# Patient Record
Sex: MALE | Race: WHITE | NOT HISPANIC OR LATINO | URBAN - METROPOLITAN AREA
[De-identification: names, ages, dates, MRNs, and addresses within clinical notes are randomized per-mention and may not be internally consistent; named-entity substitution may affect disease eponyms.]

---

## 2017-03-16 ENCOUNTER — OUTPATIENT (OUTPATIENT)
Dept: OUTPATIENT SERVICES | Facility: HOSPITAL | Age: 47
LOS: 1 days | End: 2017-03-16
Payer: COMMERCIAL

## 2017-03-16 ENCOUNTER — APPOINTMENT (OUTPATIENT)
Dept: BARIATRICS | Facility: CLINIC | Age: 47
End: 2017-03-16

## 2017-03-16 VITALS
DIASTOLIC BLOOD PRESSURE: 84 MMHG | WEIGHT: 236 LBS | BODY MASS INDEX: 35.77 KG/M2 | SYSTOLIC BLOOD PRESSURE: 136 MMHG | HEIGHT: 68 IN

## 2017-03-16 DIAGNOSIS — Z98.89 OTHER SPECIFIED POSTPROCEDURAL STATES: Chronic | ICD-10-CM

## 2017-03-16 DIAGNOSIS — Z98.84 BARIATRIC SURGERY STATUS: ICD-10-CM

## 2017-03-16 DIAGNOSIS — E66.01 MORBID (SEVERE) OBESITY DUE TO EXCESS CALORIES: ICD-10-CM

## 2017-03-16 PROCEDURE — 74220 X-RAY XM ESOPHAGUS 1CNTRST: CPT | Mod: 26

## 2017-03-16 PROCEDURE — 74220 X-RAY XM ESOPHAGUS 1CNTRST: CPT

## 2017-03-27 ENCOUNTER — NON-APPOINTMENT (OUTPATIENT)
Age: 47
End: 2017-03-27

## 2017-03-27 ENCOUNTER — APPOINTMENT (OUTPATIENT)
Dept: INTERNAL MEDICINE | Facility: CLINIC | Age: 47
End: 2017-03-27

## 2017-03-27 VITALS
BODY MASS INDEX: 36.37 KG/M2 | DIASTOLIC BLOOD PRESSURE: 84 MMHG | HEART RATE: 62 BPM | TEMPERATURE: 98.5 F | RESPIRATION RATE: 17 BRPM | SYSTOLIC BLOOD PRESSURE: 129 MMHG | HEIGHT: 68 IN | OXYGEN SATURATION: 96 % | WEIGHT: 240 LBS

## 2017-03-27 DIAGNOSIS — B35.6 TINEA CRURIS: ICD-10-CM

## 2017-03-27 DIAGNOSIS — Z78.9 OTHER SPECIFIED HEALTH STATUS: ICD-10-CM

## 2017-03-27 DIAGNOSIS — M54.41 LUMBAGO WITH SCIATICA, RIGHT SIDE: ICD-10-CM

## 2017-03-27 RX ORDER — MELOXICAM 7.5 MG/1
7.5 TABLET ORAL
Qty: 60 | Refills: 1 | Status: DISCONTINUED | COMMUNITY
Start: 2017-03-27 | End: 2017-03-27

## 2017-03-28 LAB
25(OH)D3 SERPL-MCNC: 22.4 NG/ML
ALBUMIN SERPL ELPH-MCNC: 4.5 G/DL
ALP BLD-CCNC: 68 U/L
ALT SERPL-CCNC: 27 U/L
ANION GAP SERPL CALC-SCNC: 15 MMOL/L
APPEARANCE: CLEAR
AST SERPL-CCNC: 15 U/L
BASOPHILS # BLD AUTO: 0.04 K/UL
BASOPHILS NFR BLD AUTO: 0.4 %
BILIRUB SERPL-MCNC: 0.4 MG/DL
BILIRUBIN URINE: NEGATIVE
BLOOD URINE: NEGATIVE
BUN SERPL-MCNC: 18 MG/DL
CALCIUM SERPL-MCNC: 9.7 MG/DL
CHLORIDE SERPL-SCNC: 103 MMOL/L
CHOLEST SERPL-MCNC: 190 MG/DL
CHOLEST/HDLC SERPL: 4.2 RATIO
CO2 SERPL-SCNC: 22 MMOL/L
COLOR: YELLOW
CREAT SERPL-MCNC: 0.7 MG/DL
EOSINOPHIL # BLD AUTO: 0.16 K/UL
EOSINOPHIL NFR BLD AUTO: 1.7 %
GLUCOSE QUALITATIVE U: NORMAL MG/DL
GLUCOSE SERPL-MCNC: 100 MG/DL
HBA1C MFR BLD HPLC: 5.5 %
HCT VFR BLD CALC: 43.8 %
HDLC SERPL-MCNC: 45 MG/DL
HGB BLD-MCNC: 14.8 G/DL
IMM GRANULOCYTES NFR BLD AUTO: 0.5 %
KETONES URINE: NEGATIVE
LDLC SERPL CALC-MCNC: 123 MG/DL
LEUKOCYTE ESTERASE URINE: NEGATIVE
LYMPHOCYTES # BLD AUTO: 1.96 K/UL
LYMPHOCYTES NFR BLD AUTO: 20.3 %
MAN DIFF?: NORMAL
MCHC RBC-ENTMCNC: 30.1 PG
MCHC RBC-ENTMCNC: 33.8 GM/DL
MCV RBC AUTO: 89.2 FL
MONOCYTES # BLD AUTO: 0.72 K/UL
MONOCYTES NFR BLD AUTO: 7.5 %
NEUTROPHILS # BLD AUTO: 6.72 K/UL
NEUTROPHILS NFR BLD AUTO: 69.6 %
NITRITE URINE: NEGATIVE
PH URINE: 6.5
PLATELET # BLD AUTO: 259 K/UL
POTASSIUM SERPL-SCNC: 4.5 MMOL/L
PROT SERPL-MCNC: 7.3 G/DL
PROTEIN URINE: NEGATIVE MG/DL
PSA SERPL-MCNC: 0.54 NG/ML
RBC # BLD: 4.91 M/UL
RBC # FLD: 13.2 %
SODIUM SERPL-SCNC: 140 MMOL/L
SPECIFIC GRAVITY URINE: 1.02
TRIGL SERPL-MCNC: 108 MG/DL
TSH SERPL-ACNC: 1.11 UIU/ML
UROBILINOGEN URINE: NORMAL MG/DL
WBC # FLD AUTO: 9.65 K/UL

## 2017-05-23 ENCOUNTER — RX RENEWAL (OUTPATIENT)
Age: 47
End: 2017-05-23

## 2017-05-24 RX ORDER — VARENICLINE TARTRATE 0.5 (11)-1
0.5 MG X 11 & KIT ORAL
Qty: 53 | Refills: 0 | Status: DISCONTINUED | COMMUNITY
Start: 2017-03-27 | End: 2017-05-24

## 2017-12-21 ENCOUNTER — TRANSCRIPTION ENCOUNTER (OUTPATIENT)
Age: 47
End: 2017-12-21

## 2018-04-05 ENCOUNTER — TRANSCRIPTION ENCOUNTER (OUTPATIENT)
Age: 48
End: 2018-04-05

## 2018-06-28 RX ORDER — OXYCODONE AND ACETAMINOPHEN 5; 325 MG/1; MG/1
5-325 TABLET ORAL
Qty: 30 | Refills: 0 | Status: DISCONTINUED | COMMUNITY
Start: 2017-03-27 | End: 2018-06-28

## 2018-06-30 ENCOUNTER — RX RENEWAL (OUTPATIENT)
Age: 48
End: 2018-06-30

## 2018-08-20 ENCOUNTER — APPOINTMENT (OUTPATIENT)
Dept: INTERNAL MEDICINE | Facility: CLINIC | Age: 48
End: 2018-08-20
Payer: COMMERCIAL

## 2018-08-20 VITALS
OXYGEN SATURATION: 96 % | TEMPERATURE: 98.6 F | WEIGHT: 250 LBS | RESPIRATION RATE: 17 BRPM | DIASTOLIC BLOOD PRESSURE: 88 MMHG | HEART RATE: 80 BPM | SYSTOLIC BLOOD PRESSURE: 135 MMHG | BODY MASS INDEX: 38.01 KG/M2

## 2018-08-20 DIAGNOSIS — F41.8 OTHER SPECIFIED ANXIETY DISORDERS: ICD-10-CM

## 2018-08-20 PROCEDURE — 99396 PREV VISIT EST AGE 40-64: CPT

## 2018-08-20 RX ORDER — VARENICLINE TARTRATE 1 MG/1
1 TABLET, FILM COATED ORAL
Qty: 60 | Refills: 1 | Status: DISCONTINUED | COMMUNITY
Start: 2017-05-24 | End: 2018-08-20

## 2018-08-20 NOTE — HISTORY OF PRESENT ILLNESS
[FreeTextEntry1] : History of Present Illness:  This patient is a 49 yo male here for his health maintenance evaluation. \par \par He has a PMH of lap band surgery 3 years ago lost 65 lb, diverticular disease/bowel resection, umbilical hernia, herniated disk with R sided sciatica (f/u chiropractor), smoker, now vaping.\par \par He states that his father-in-law , stressed out- requesting refills on alprazolam\par \par Significant PMH: diverticulitis, s/p bowel resection was obese with HTN and TJ \par Surgical Hx: lap band surgery 2 years ago lost 65 lb, bowel resection , umbilical hernia, herniated disk with R sided sciatica (f/u chiropractor)\par Family Hx: mother- colon cancer; father-CAD\par Allergies: NKDA\par Meds: off losartan after weigh loss surgery, Percocet, alprazolam PRN anxiety/flying\par \par Brief Social History:\par Work/EMS\par Living situation:  Lives with wife and daughter\par Tobacco Use: Smoked. 1/2 ppd x 20 years, now 4-5 cig per day; tried gum, patches- smoking cessation discussed at length, pt is now vaping\par EtOH/Drug use: Denies drugs, social EtOH\par \par Screening:\par Colon CA Screening: done last year- 2017, repeat done 2-3 years b/c diverticular disease\par Prostate CA screening: will check\par Depression screening: PHQ-2 screen is negative\par DV: Patient feels safe at home\par Vision/Dental: up-to-date with vision and dental\par \par \par

## 2018-08-20 NOTE — HEALTH RISK ASSESSMENT
[Good] : ~his/her~  mood as  good [No falls in past year] : Patient reported no falls in the past year [0] : 2) Feeling down, depressed, or hopeless: Not at all (0) [HIV test declined] : HIV test declined [Hepatitis C test declined] : Hepatitis C test declined [Discussed at today's visit] : Advance Directives Discussed at today's visit [FreeTextEntry4] : Ms. Marx (wife) - HCP

## 2018-08-20 NOTE — ASSESSMENT
[FreeTextEntry1] : Assessment/Plan:\par Patient is a 47 yo male presents for annual physical.  His BMI is 38 post lap band surgery.  Healthy eating habits and weight loss was discussed.  Patient is fasting for bloodwork today.\par \par Brief counseling - lead active life 30 min exercise 3-5 times per week\par Smoking Cessation: Smokers should quit: this increases risk for lung disease and cancers, early death.  Time spent counseling pt on smoking cessation: 5-10 min. [facility] phone number provided: [phone]. Mary prescribed, SE reviewed\par \par Labs to be done:  UA, CBC, Lipids, BMP/blood glucose, TSH, Vitamin D, HbA1C, PSA\par All questions were answered. Patient understands and is in agreement with the plan of care.\par Annual Well Visit: Recommended 1 year.\par \par \par

## 2018-08-20 NOTE — PHYSICAL EXAM
[General Appearance - Alert] : alert [General Appearance - In No Acute Distress] : in no acute distress [Sclera] : the sclera and conjunctiva were normal [PERRL With Normal Accommodation] : pupils were equal in size, round, and reactive to light [Extraocular Movements] : extraocular movements were intact [Outer Ear] : the ears and nose were normal in appearance [Oropharynx] : the oropharynx was normal [Neck Appearance] : the appearance of the neck was normal [Neck Cervical Mass (___cm)] : no neck mass was observed [Thyroid Diffuse Enlargement] : the thyroid was not enlarged [] : no respiratory distress [Auscultation Breath Sounds / Voice Sounds] : lungs were clear to auscultation bilaterally [Heart Rate And Rhythm] : heart rate was normal and rhythm regular [Heart Sounds] : normal S1 and S2 [Murmurs] : no murmurs [Full Pulse] : the pedal pulses are present [Edema] : there was no peripheral edema [Bowel Sounds] : normal bowel sounds [Abdomen Soft] : soft [Abdomen Tenderness] : non-tender [Cervical Lymph Nodes Enlarged Posterior Bilaterally] : posterior cervical [Cervical Lymph Nodes Enlarged Anterior Bilaterally] : anterior cervical [Supraclavicular Lymph Nodes Enlarged Bilaterally] : supraclavicular [No CVA Tenderness] : no ~M costovertebral angle tenderness [Abnormal Walk] : normal gait [Nail Clubbing] : no clubbing  or cyanosis of the fingernails [Musculoskeletal - Swelling] : no joint swelling seen [Motor Tone] : muscle strength and tone were normal [Deep Tendon Reflexes (DTR)] : deep tendon reflexes were 2+ and symmetric [Sensation] : the sensory exam was normal to light touch and pinprick [No Focal Deficits] : no focal deficits [Oriented To Time, Place, And Person] : oriented to person, place, and time [Impaired Insight] : insight and judgment were intact [Affect] : the affect was normal [FreeTextEntry1] : eczema left calf and groin tinea cruris, onychomycosis

## 2018-08-27 LAB
25(OH)D3 SERPL-MCNC: 25.2 NG/ML
ALBUMIN SERPL ELPH-MCNC: 4.5 G/DL
ALP BLD-CCNC: 67 U/L
ALT SERPL-CCNC: 29 U/L
ANION GAP SERPL CALC-SCNC: 16 MMOL/L
APPEARANCE: CLEAR
AST SERPL-CCNC: 15 U/L
BASOPHILS # BLD AUTO: 0.05 K/UL
BASOPHILS NFR BLD AUTO: 0.6 %
BILIRUB SERPL-MCNC: 0.3 MG/DL
BILIRUBIN URINE: NEGATIVE
BLOOD URINE: NEGATIVE
BUN SERPL-MCNC: 13 MG/DL
CALCIUM SERPL-MCNC: 9.9 MG/DL
CHLORIDE SERPL-SCNC: 102 MMOL/L
CHOLEST SERPL-MCNC: 165 MG/DL
CHOLEST/HDLC SERPL: 4.2 RATIO
CO2 SERPL-SCNC: 23 MMOL/L
COLOR: YELLOW
CREAT SERPL-MCNC: 0.73 MG/DL
EOSINOPHIL # BLD AUTO: 0.16 K/UL
EOSINOPHIL NFR BLD AUTO: 2 %
GLUCOSE QUALITATIVE U: NEGATIVE MG/DL
GLUCOSE SERPL-MCNC: 106 MG/DL
HBA1C MFR BLD HPLC: 5.4 %
HCT VFR BLD CALC: 44.3 %
HDLC SERPL-MCNC: 39 MG/DL
HGB BLD-MCNC: 14.7 G/DL
IMM GRANULOCYTES NFR BLD AUTO: 0.4 %
KETONES URINE: NEGATIVE
LDLC SERPL CALC-MCNC: 85 MG/DL
LEUKOCYTE ESTERASE URINE: NEGATIVE
LYMPHOCYTES # BLD AUTO: 1.74 K/UL
LYMPHOCYTES NFR BLD AUTO: 22.2 %
MAN DIFF?: NORMAL
MCHC RBC-ENTMCNC: 31.2 PG
MCHC RBC-ENTMCNC: 33.2 GM/DL
MCV RBC AUTO: 94.1 FL
MONOCYTES # BLD AUTO: 0.66 K/UL
MONOCYTES NFR BLD AUTO: 8.4 %
NEUTROPHILS # BLD AUTO: 5.21 K/UL
NEUTROPHILS NFR BLD AUTO: 66.4 %
NITRITE URINE: NEGATIVE
PH URINE: 6
PLATELET # BLD AUTO: 283 K/UL
POTASSIUM SERPL-SCNC: 4.5 MMOL/L
PROT SERPL-MCNC: 7.1 G/DL
PROTEIN URINE: NEGATIVE MG/DL
PSA FREE FLD-MCNC: 20.7
PSA FREE SERPL-MCNC: 0.18 NG/ML
PSA SERPL-MCNC: 0.87 NG/ML
RBC # BLD: 4.71 M/UL
RBC # FLD: 12.7 %
SODIUM SERPL-SCNC: 141 MMOL/L
SPECIFIC GRAVITY URINE: 1.02
TRIGL SERPL-MCNC: 204 MG/DL
TSH SERPL-ACNC: 1.2 UIU/ML
UROBILINOGEN URINE: NEGATIVE MG/DL
WBC # FLD AUTO: 7.85 K/UL

## 2018-10-16 ENCOUNTER — TRANSCRIPTION ENCOUNTER (OUTPATIENT)
Age: 48
End: 2018-10-16

## 2019-02-25 ENCOUNTER — NON-APPOINTMENT (OUTPATIENT)
Age: 49
End: 2019-02-25

## 2019-02-25 ENCOUNTER — APPOINTMENT (OUTPATIENT)
Dept: INTERNAL MEDICINE | Facility: CLINIC | Age: 49
End: 2019-02-25
Payer: COMMERCIAL

## 2019-02-25 VITALS
OXYGEN SATURATION: 97 % | TEMPERATURE: 98.8 F | DIASTOLIC BLOOD PRESSURE: 89 MMHG | SYSTOLIC BLOOD PRESSURE: 135 MMHG | RESPIRATION RATE: 17 BRPM | HEART RATE: 91 BPM

## 2019-02-25 PROCEDURE — 99214 OFFICE O/P EST MOD 30 MIN: CPT

## 2019-05-16 ENCOUNTER — INPATIENT (INPATIENT)
Facility: HOSPITAL | Age: 49
LOS: 0 days | Discharge: HOME | DRG: 392 | End: 2019-05-17
Attending: HOSPITALIST | Admitting: STUDENT IN AN ORGANIZED HEALTH CARE EDUCATION/TRAINING PROGRAM
Payer: COMMERCIAL

## 2019-05-16 VITALS
HEART RATE: 121 BPM | DIASTOLIC BLOOD PRESSURE: 108 MMHG | TEMPERATURE: 99 F | OXYGEN SATURATION: 97 % | WEIGHT: 255.07 LBS | RESPIRATION RATE: 18 BRPM | SYSTOLIC BLOOD PRESSURE: 163 MMHG | HEIGHT: 68 IN

## 2019-05-16 DIAGNOSIS — Z98.89 OTHER SPECIFIED POSTPROCEDURAL STATES: Chronic | ICD-10-CM

## 2019-05-16 PROCEDURE — 99285 EMERGENCY DEPT VISIT HI MDM: CPT | Mod: 25

## 2019-05-16 PROCEDURE — 93010 ELECTROCARDIOGRAM REPORT: CPT

## 2019-05-17 ENCOUNTER — TRANSCRIPTION ENCOUNTER (OUTPATIENT)
Age: 49
End: 2019-05-17

## 2019-05-17 VITALS
HEART RATE: 72 BPM | RESPIRATION RATE: 18 BRPM | OXYGEN SATURATION: 96 % | TEMPERATURE: 98 F | SYSTOLIC BLOOD PRESSURE: 136 MMHG | DIASTOLIC BLOOD PRESSURE: 92 MMHG

## 2019-05-17 DIAGNOSIS — K92.2 GASTROINTESTINAL HEMORRHAGE, UNSPECIFIED: ICD-10-CM

## 2019-05-17 DIAGNOSIS — K76.0 FATTY (CHANGE OF) LIVER, NOT ELSEWHERE CLASSIFIED: ICD-10-CM

## 2019-05-17 DIAGNOSIS — Z29.9 ENCOUNTER FOR PROPHYLACTIC MEASURES, UNSPECIFIED: ICD-10-CM

## 2019-05-17 DIAGNOSIS — F41.9 ANXIETY DISORDER, UNSPECIFIED: ICD-10-CM

## 2019-05-17 LAB
ALBUMIN SERPL ELPH-MCNC: 4.4 G/DL — SIGNIFICANT CHANGE UP (ref 3.3–5)
ALP SERPL-CCNC: 69 U/L — SIGNIFICANT CHANGE UP (ref 40–120)
ALT FLD-CCNC: 55 U/L — HIGH (ref 10–45)
ANION GAP SERPL CALC-SCNC: 14 MMOL/L — SIGNIFICANT CHANGE UP (ref 5–17)
APPEARANCE UR: CLEAR — SIGNIFICANT CHANGE UP
APTT BLD: 33.8 SEC — SIGNIFICANT CHANGE UP (ref 27.5–36.3)
AST SERPL-CCNC: 28 U/L — SIGNIFICANT CHANGE UP (ref 10–40)
BACTERIA # UR AUTO: NEGATIVE — SIGNIFICANT CHANGE UP
BASE EXCESS BLDV CALC-SCNC: 1.8 MMOL/L — SIGNIFICANT CHANGE UP (ref -2–2)
BILIRUB SERPL-MCNC: 0.4 MG/DL — SIGNIFICANT CHANGE UP (ref 0.2–1.2)
BILIRUB UR-MCNC: NEGATIVE — SIGNIFICANT CHANGE UP
BLD GP AB SCN SERPL QL: NEGATIVE — SIGNIFICANT CHANGE UP
BUN SERPL-MCNC: 17 MG/DL — SIGNIFICANT CHANGE UP (ref 7–23)
CA-I SERPL-SCNC: 1.22 MMOL/L — SIGNIFICANT CHANGE UP (ref 1.12–1.3)
CALCIUM SERPL-MCNC: 9.8 MG/DL — SIGNIFICANT CHANGE UP (ref 8.4–10.5)
CHLORIDE BLDV-SCNC: 105 MMOL/L — SIGNIFICANT CHANGE UP (ref 96–108)
CHLORIDE SERPL-SCNC: 100 MMOL/L — SIGNIFICANT CHANGE UP (ref 96–108)
CO2 BLDV-SCNC: 27 MMOL/L — SIGNIFICANT CHANGE UP (ref 22–30)
CO2 SERPL-SCNC: 23 MMOL/L — SIGNIFICANT CHANGE UP (ref 22–31)
COLOR SPEC: SIGNIFICANT CHANGE UP
CREAT SERPL-MCNC: 0.82 MG/DL — SIGNIFICANT CHANGE UP (ref 0.5–1.3)
DIFF PNL FLD: NEGATIVE — SIGNIFICANT CHANGE UP
EPI CELLS # UR: 0 /HPF — SIGNIFICANT CHANGE UP
GAS PNL BLDV: 137 MMOL/L — SIGNIFICANT CHANGE UP (ref 136–145)
GAS PNL BLDV: SIGNIFICANT CHANGE UP
GLUCOSE BLDV-MCNC: 99 MG/DL — SIGNIFICANT CHANGE UP (ref 70–99)
GLUCOSE SERPL-MCNC: 99 MG/DL — SIGNIFICANT CHANGE UP (ref 70–99)
GLUCOSE UR QL: NEGATIVE — SIGNIFICANT CHANGE UP
HCO3 BLDV-SCNC: 26 MMOL/L — SIGNIFICANT CHANGE UP (ref 21–29)
HCT VFR BLD CALC: 35.8 % — LOW (ref 39–50)
HCT VFR BLD CALC: 40.2 % — SIGNIFICANT CHANGE UP (ref 39–50)
HCT VFR BLD CALC: 45 % — SIGNIFICANT CHANGE UP (ref 39–50)
HCT VFR BLDA CALC: 47 % — SIGNIFICANT CHANGE UP (ref 39–50)
HGB BLD CALC-MCNC: 15.4 G/DL — SIGNIFICANT CHANGE UP (ref 13–17)
HGB BLD-MCNC: 12.8 G/DL — LOW (ref 13–17)
HGB BLD-MCNC: 13.8 G/DL — SIGNIFICANT CHANGE UP (ref 13–17)
HGB BLD-MCNC: 15.3 G/DL — SIGNIFICANT CHANGE UP (ref 13–17)
HYALINE CASTS # UR AUTO: 0 /LPF — SIGNIFICANT CHANGE UP (ref 0–2)
INR BLD: 1.05 RATIO — SIGNIFICANT CHANGE UP (ref 0.88–1.16)
KETONES UR-MCNC: NEGATIVE — SIGNIFICANT CHANGE UP
LACTATE BLDV-MCNC: 1.6 MMOL/L — SIGNIFICANT CHANGE UP (ref 0.7–2)
LEUKOCYTE ESTERASE UR-ACNC: NEGATIVE — SIGNIFICANT CHANGE UP
MCHC RBC-ENTMCNC: 30.2 PG — SIGNIFICANT CHANGE UP (ref 27–34)
MCHC RBC-ENTMCNC: 30.6 PG — SIGNIFICANT CHANGE UP (ref 27–34)
MCHC RBC-ENTMCNC: 31.8 PG — SIGNIFICANT CHANGE UP (ref 27–34)
MCHC RBC-ENTMCNC: 34 GM/DL — SIGNIFICANT CHANGE UP (ref 32–36)
MCHC RBC-ENTMCNC: 34.4 GM/DL — SIGNIFICANT CHANGE UP (ref 32–36)
MCHC RBC-ENTMCNC: 35.8 GM/DL — SIGNIFICANT CHANGE UP (ref 32–36)
MCV RBC AUTO: 88.9 FL — SIGNIFICANT CHANGE UP (ref 80–100)
MCV RBC AUTO: 89 FL — SIGNIFICANT CHANGE UP (ref 80–100)
MCV RBC AUTO: 89 FL — SIGNIFICANT CHANGE UP (ref 80–100)
NITRITE UR-MCNC: NEGATIVE — SIGNIFICANT CHANGE UP
PCO2 BLDV: 40 MMHG — SIGNIFICANT CHANGE UP (ref 35–50)
PH BLDV: 7.42 — SIGNIFICANT CHANGE UP (ref 7.35–7.45)
PH UR: 6 — SIGNIFICANT CHANGE UP (ref 5–8)
PLATELET # BLD AUTO: 249 K/UL — SIGNIFICANT CHANGE UP (ref 150–400)
PLATELET # BLD AUTO: 289 K/UL — SIGNIFICANT CHANGE UP (ref 150–400)
PLATELET # BLD AUTO: 379 K/UL — SIGNIFICANT CHANGE UP (ref 150–400)
PO2 BLDV: 44 MMHG — SIGNIFICANT CHANGE UP (ref 25–45)
POTASSIUM BLDV-SCNC: 4 MMOL/L — SIGNIFICANT CHANGE UP (ref 3.5–5.3)
POTASSIUM SERPL-MCNC: 4.3 MMOL/L — SIGNIFICANT CHANGE UP (ref 3.5–5.3)
POTASSIUM SERPL-SCNC: 4.3 MMOL/L — SIGNIFICANT CHANGE UP (ref 3.5–5.3)
PROT SERPL-MCNC: 7.6 G/DL — SIGNIFICANT CHANGE UP (ref 6–8.3)
PROT UR-MCNC: ABNORMAL
PROTHROM AB SERPL-ACNC: 12.1 SEC — SIGNIFICANT CHANGE UP (ref 10–12.9)
RBC # BLD: 4.03 M/UL — LOW (ref 4.2–5.8)
RBC # BLD: 4.51 M/UL — SIGNIFICANT CHANGE UP (ref 4.2–5.8)
RBC # BLD: 5.06 M/UL — SIGNIFICANT CHANGE UP (ref 4.2–5.8)
RBC # FLD: 11.2 % — SIGNIFICANT CHANGE UP (ref 10.3–14.5)
RBC # FLD: 11.3 % — SIGNIFICANT CHANGE UP (ref 10.3–14.5)
RBC # FLD: 11.4 % — SIGNIFICANT CHANGE UP (ref 10.3–14.5)
RBC CASTS # UR COMP ASSIST: 1 /HPF — SIGNIFICANT CHANGE UP (ref 0–4)
RH IG SCN BLD-IMP: POSITIVE — SIGNIFICANT CHANGE UP
RH IG SCN BLD-IMP: POSITIVE — SIGNIFICANT CHANGE UP
SAO2 % BLDV: 77 % — SIGNIFICANT CHANGE UP (ref 67–88)
SODIUM SERPL-SCNC: 137 MMOL/L — SIGNIFICANT CHANGE UP (ref 135–145)
SP GR SPEC: 1.02 — SIGNIFICANT CHANGE UP (ref 1.01–1.02)
UROBILINOGEN FLD QL: NEGATIVE — SIGNIFICANT CHANGE UP
WBC # BLD: 10.5 K/UL — SIGNIFICANT CHANGE UP (ref 3.8–10.5)
WBC # BLD: 13.5 K/UL — HIGH (ref 3.8–10.5)
WBC # BLD: 9.2 K/UL — SIGNIFICANT CHANGE UP (ref 3.8–10.5)
WBC # FLD AUTO: 10.5 K/UL — SIGNIFICANT CHANGE UP (ref 3.8–10.5)
WBC # FLD AUTO: 13.5 K/UL — HIGH (ref 3.8–10.5)
WBC # FLD AUTO: 9.2 K/UL — SIGNIFICANT CHANGE UP (ref 3.8–10.5)
WBC UR QL: 1 /HPF — SIGNIFICANT CHANGE UP (ref 0–5)

## 2019-05-17 PROCEDURE — 99223 1ST HOSP IP/OBS HIGH 75: CPT | Mod: GC

## 2019-05-17 PROCEDURE — 74177 CT ABD & PELVIS W/CONTRAST: CPT | Mod: 26

## 2019-05-17 PROCEDURE — 82330 ASSAY OF CALCIUM: CPT

## 2019-05-17 PROCEDURE — 82803 BLOOD GASES ANY COMBINATION: CPT

## 2019-05-17 PROCEDURE — 45378 DIAGNOSTIC COLONOSCOPY: CPT

## 2019-05-17 PROCEDURE — 84295 ASSAY OF SERUM SODIUM: CPT

## 2019-05-17 PROCEDURE — 85027 COMPLETE CBC AUTOMATED: CPT

## 2019-05-17 PROCEDURE — 86850 RBC ANTIBODY SCREEN: CPT

## 2019-05-17 PROCEDURE — 84132 ASSAY OF SERUM POTASSIUM: CPT

## 2019-05-17 PROCEDURE — 85014 HEMATOCRIT: CPT

## 2019-05-17 PROCEDURE — 82947 ASSAY GLUCOSE BLOOD QUANT: CPT

## 2019-05-17 PROCEDURE — 85730 THROMBOPLASTIN TIME PARTIAL: CPT

## 2019-05-17 PROCEDURE — 99285 EMERGENCY DEPT VISIT HI MDM: CPT | Mod: 25

## 2019-05-17 PROCEDURE — 82435 ASSAY OF BLOOD CHLORIDE: CPT

## 2019-05-17 PROCEDURE — 86901 BLOOD TYPING SEROLOGIC RH(D): CPT

## 2019-05-17 PROCEDURE — 86900 BLOOD TYPING SEROLOGIC ABO: CPT

## 2019-05-17 PROCEDURE — 12345: CPT | Mod: NC,GC

## 2019-05-17 PROCEDURE — 83605 ASSAY OF LACTIC ACID: CPT

## 2019-05-17 PROCEDURE — 96360 HYDRATION IV INFUSION INIT: CPT | Mod: XU

## 2019-05-17 PROCEDURE — 85610 PROTHROMBIN TIME: CPT

## 2019-05-17 PROCEDURE — 99223 1ST HOSP IP/OBS HIGH 75: CPT | Mod: 25

## 2019-05-17 PROCEDURE — 74177 CT ABD & PELVIS W/CONTRAST: CPT

## 2019-05-17 PROCEDURE — 96361 HYDRATE IV INFUSION ADD-ON: CPT

## 2019-05-17 PROCEDURE — 93005 ELECTROCARDIOGRAM TRACING: CPT

## 2019-05-17 PROCEDURE — 81001 URINALYSIS AUTO W/SCOPE: CPT

## 2019-05-17 PROCEDURE — 80053 COMPREHEN METABOLIC PANEL: CPT

## 2019-05-17 RX ORDER — SODIUM CHLORIDE 9 MG/ML
1000 INJECTION, SOLUTION INTRAVENOUS ONCE
Refills: 0 | Status: COMPLETED | OUTPATIENT
Start: 2019-05-17 | End: 2019-05-17

## 2019-05-17 RX ORDER — ACETAMINOPHEN 500 MG
650 TABLET ORAL ONCE
Refills: 0 | Status: COMPLETED | OUTPATIENT
Start: 2019-05-17 | End: 2019-05-17

## 2019-05-17 RX ORDER — SODIUM CHLORIDE 9 MG/ML
1000 INJECTION INTRAMUSCULAR; INTRAVENOUS; SUBCUTANEOUS ONCE
Refills: 0 | Status: DISCONTINUED | OUTPATIENT
Start: 2019-05-17 | End: 2019-05-17

## 2019-05-17 RX ORDER — ACETAMINOPHEN 500 MG
650 TABLET ORAL EVERY 6 HOURS
Refills: 0 | Status: DISCONTINUED | OUTPATIENT
Start: 2019-05-17 | End: 2019-05-17

## 2019-05-17 RX ORDER — SOD SULF/SODIUM/NAHCO3/KCL/PEG
4000 SOLUTION, RECONSTITUTED, ORAL ORAL ONCE
Refills: 0 | Status: COMPLETED | OUTPATIENT
Start: 2019-05-17 | End: 2019-05-17

## 2019-05-17 RX ORDER — SODIUM CHLORIDE 9 MG/ML
1000 INJECTION INTRAMUSCULAR; INTRAVENOUS; SUBCUTANEOUS
Refills: 0 | Status: DISCONTINUED | OUTPATIENT
Start: 2019-05-17 | End: 2019-05-17

## 2019-05-17 RX ORDER — SODIUM CHLORIDE 9 MG/ML
2000 INJECTION, SOLUTION INTRAVENOUS ONCE
Refills: 0 | Status: COMPLETED | OUTPATIENT
Start: 2019-05-17 | End: 2019-05-17

## 2019-05-17 RX ADMIN — SODIUM CHLORIDE 2000 MILLILITER(S): 9 INJECTION, SOLUTION INTRAVENOUS at 00:22

## 2019-05-17 RX ADMIN — Medication 4000 MILLILITER(S): at 03:03

## 2019-05-17 RX ADMIN — Medication 650 MILLIGRAM(S): at 11:32

## 2019-05-17 RX ADMIN — Medication 650 MILLIGRAM(S): at 06:26

## 2019-05-17 RX ADMIN — SODIUM CHLORIDE 120 MILLILITER(S): 9 INJECTION INTRAMUSCULAR; INTRAVENOUS; SUBCUTANEOUS at 11:34

## 2019-05-17 RX ADMIN — SODIUM CHLORIDE 1000 MILLILITER(S): 9 INJECTION, SOLUTION INTRAVENOUS at 01:55

## 2019-05-17 RX ADMIN — Medication 650 MILLIGRAM(S): at 06:56

## 2019-05-17 RX ADMIN — SODIUM CHLORIDE 2000 MILLILITER(S): 9 INJECTION, SOLUTION INTRAVENOUS at 01:55

## 2019-05-17 NOTE — ED PROVIDER NOTE - PROGRESS NOTE DETAILS
Resident spoke with surgery resident who said to call GI instead. Spoke to call service for GI to page on-call GI doctor at 1:57am. Remains HDS.  Case d/w GI fellow.  Requesting golytely; will scope in AM.  These recs were relayed to the nighttime hospitalist.  Rpt CBC pending.  --BMM

## 2019-05-17 NOTE — CONSULT NOTE ADULT - SUBJECTIVE AND OBJECTIVE BOX
Chief Complaint:  Patient is a 49y old  Male who presents with a chief complaint of Gi bleed (17 May 2019 08:39)      HPI:  49M with history of small bowel/colon resection for diverticulitis, lap band for obesity, presents to the hospital from home for GI bleed. Pt reports that he was working when he had a bloody BM the night before admission. Pt denies feeling any abd pain at the time. Pt reports having a history of diverticulitis for which he had a partial bowel resection many years ago.  Had had occasional blood on tissue paper, but he has not had bloody BM's any other time. Pt denies fevers, chills, palpitations, chest pain, SOB, abd pain. He denies using aspirin, NSAIDs or anticoagulation.  He had colonoscopy 2 years ago, was normal    In the ED:   Vitals: 98.8, 132->78, 163/100->133/85, rr20 97% RA  Notable Labs and Imaging: hgb 15.3->13.8, CTA active bleed to hepatic flexure of colon  Given: 4L fluid, go-lightly (17 May 2019 06:18)      Allergies:  No Known Allergies    Home Medications:  ALPRAZolam 0.25 mg oral tablet: 1 tab(s) orally once a day, As Needed (17 May 2019 06:28)      Hospital Medications:  acetaminophen   Tablet .. 650 milliGRAM(s) Oral every 6 hours PRN  sodium chloride 0.9%. 1000 milliLiter(s) IV Continuous <Continuous>      PMHX/PSHX:  Anxiety  Obesity, morbid, BMI 40.0-49.9  Sleep apnea  Hypertension  History of umbilical hernia repair  S/P small bowel resection      Family history:  Family history of hypertension  Family history of coronary artery disease      Social History: no tob/etoh/drugs    ROS:     General:  No wt loss, fevers, chills, night sweats, fatigue,   Eyes:  Good vision, no reported pain  ENT:  No sore throat, pain, runny nose, dysphagia  CV:  No pain, palpitations, hypo/hypertension  Resp:  No dyspnea, cough, tachypnea, wheezing  GI:  See HPI  :  No pain, bleeding, incontinence, nocturia  Muscle:  No pain, weakness  Neuro:  No weakness, tingling, memory problems  Psych:  No fatigue, insomnia, mood problems, depression  Endocrine:  No polyuria, polydipsia, cold/heat intolerance  Heme:  No petechiae, ecchymosis, easy bruisability  Skin:  No rash, edema      PHYSICAL EXAM:     GENERAL:  Appears stated age, well-groomed, well-nourished, no distress  HEENT:  NC/AT,  conjunctivae clear and pink,  no JVD  CHEST:  Full & symmetric excursion, no increased effort, breath sounds clear  HEART:  Regular rhythm, S1, S2, no murmur/rub/S3/S4, no abdominal bruit, no edema  ABDOMEN:  Soft, non-tender, non-distended, normoactive bowel sounds,  no masses ,  EXTREMITIES:  no cyanosis,clubbing or edema  SKIN:  No rash/erythema/ecchymoses/petechiae/wounds/abscess/warm/dry  NEURO:  Alert, oriented    Vital Signs:  Vital Signs Last 24 Hrs  T(C): 36.4 (17 May 2019 09:21), Max: 37.2 (17 May 2019 00:18)  T(F): 97.6 (17 May 2019 09:21), Max: 99 (17 May 2019 00:18)  HR: 78 (17 May 2019 09:21) (78 - 132)  BP: 120/78 (17 May 2019 09:21) (120/78 - 163/108)  BP(mean): --  RR: 18 (17 May 2019 09:21) (15 - 20)  SpO2: 94% (17 May 2019 09:21) (94% - 100%)  Daily Height in cm: 172.72 (16 May 2019 23:38)    Daily     LABS:                        12.8   9.2   )-----------( 249      ( 17 May 2019 10:58 )             35.8         137  |  100  |  17  ----------------------------<  99  4.3   |  23  |  0.82    Ca    9.8      17 May 2019 00:33    TPro  7.6  /  Alb  4.4  /  TBili  0.4  /  DBili  x   /  AST  28  /  ALT  55<H>  /  AlkPhos  69      LIVER FUNCTIONS - ( 17 May 2019 00:33 )  Alb: 4.4 g/dL / Pro: 7.6 g/dL / ALK PHOS: 69 U/L / ALT: 55 U/L / AST: 28 U/L / GGT: x           PT/INR - ( 17 May 2019 00:33 )   PT: 12.1 sec;   INR: 1.05 ratio         PTT - ( 17 May 2019 00:33 )  PTT:33.8 sec  Urinalysis Basic - ( 17 May 2019 01:01 )    Color: Light Yellow / Appearance: Clear / S.024 / pH: x  Gluc: x / Ketone: Negative  / Bili: Negative / Urobili: Negative   Blood: x / Protein: Trace / Nitrite: Negative   Leuk Esterase: Negative / RBC: 1 /hpf / WBC 1 /HPF   Sq Epi: x / Non Sq Epi: 0 /hpf / Bacteria: Negative          Imaging:  < from: CT Abdomen and Pelvis w/ IV Cont (19 @ 00:44) >    EXAM:  CT ABDOMEN AND PELVIS IC                            PROCEDURE DATE:  2019            INTERPRETATION:  CLINICAL INFORMATION: Hematochezia and tachycardia    COMPARISON: None.    PROCEDURE:   CT of the Abdomen and Pelvis was performed with intravenous contrast   using the GI bleed protocol (precontrast, arterial and venous phases).   Intravenous contrast: 90 ml Omnipaque 350. 10 ml discarded.  Oral contrast: None.  Sagittal and coronal reformats were performed.    FINDINGS:    LOWER CHEST: Calcified granulomas in the left infrahilar region.    LIVER: Fatty.  BILE DUCTS: Normal caliber.  GALLBLADDER: Within normal limits.  SPLEEN: Punctate calcific densities within the spleen likely calcified   granulomas.  PANCREAS: Within normallimits.  ADRENALS: Within normal limits.  KIDNEYS/URETERS: Within normal limits.    BLADDER: Within normal limits.  REPRODUCTIVE ORGANS: Prostate within normal limits.    BOWEL: Lap band in place.No bowel obstruction. Status post partial   colectomy.The appendix is normal. There is filling of intraluminal   contrast at the mid right colon/hepatic flexure which is severely looped   and tortuous compatible with active GI bleed. Scattered diverticulosis.   Postsurgical changes in the mid sigmoid colon.  PERITONEUM: No ascites.  VESSELS:  Scattered atherosclerosis.  RETROPERITONEUM: No lymphadenopathy.    ABDOMINAL WALL: Lap band port noted in the soft tissues of the left upper   quadrant. Ventral hernia mesh in the supraumbilical and periumbilical   soft tissues.  BONES: Within normal limits.    IMPRESSION:     There is active gastrointestinal bleeding in the right colon/hepatic   flexure. Findings discussed with Dr. Webb at 1:50 a.m. on May 17,   2019. Moderate to severe looping of the right and transverse segments.  No focal bowel wall thickening or diverticulitis.  Fatty liver.  Lap band in place.  Postsurgical changes in the sigmoid colon.  Ventral hernia mesh in place.      LIOR HOWELL M.D., RADIOLOGY RESIDENT  This document has been electronically signed.  CLAUDIA LAU M.D, ATTENDING RADIOLOGIST  This document has been electronically signed. May 17 2019  1:49AM                < end of copied text >

## 2019-05-17 NOTE — CONSULT NOTE ADULT - ASSESSMENT
Impression:  50yo M with history of diverticulitis s/p small bowel resection, lap band, presents with BRBPR with positive CTA.    #Hematochezia - likely diverticular bleed but consider new IBD dx, angioectasia, colon mass/polyp, anastamotic ulcer  # H/o diverticulitis - none currently    Recs:  - will plan for colonoscopy today (added on to schedule)  - NPO   - trend CBC  - transfuse if Hgb <7  - further care per primary team

## 2019-05-17 NOTE — ED ADULT NURSE NOTE - OBJECTIVE STATEMENT
50 y/o male, Hx diverticulitis with bowel resection and lap band surgery. Presents a&o x3, c/o GI bleed x2 episodes, once prior to arrival at ED, and 2nd in ED restroom. Reports sensation of having to have BM, with dark red blood completely changing color of bowl, unsure if passed stool, describes as "slippery." No hx of taking blood thinners. Denies headache, weakness, dizziness, fevers, chills. Breathing even, full, unlabored, no cough, no SOB. No chest pain, no palpitations, cardiac monitor in place, Sinus Tachycardia. Abdomen soft, nontender, no nausea/vomiting, +recent constipation with occasional bright red blood with wiping if straining. Skin warm/dry/intact, no edema. Stretcher locked in low position. Advised of plan of care.

## 2019-05-17 NOTE — ED ADULT NURSE REASSESSMENT NOTE - NS ED NURSE REASSESS COMMENT FT1
Pt states he had another bloody bowel movement at this time. Pt endorses lightheadedness at this time, MD Blevins made aware and states he will re-assess pt. 2nd Type and Screen sent.

## 2019-05-17 NOTE — DISCHARGE NOTE NURSING/CASE MANAGEMENT/SOCIAL WORK - NSDCPNINST_GEN_ALL_CORE
Please return to the hospital if you experience any of the following: fever, pain that is uncontrolled w/pain medication, nausea/vomiting/unable to tolerate any diet. Pt. verbalized understanding of discharge instructions. Pt. alert and oriented x 4, ambulatory, voiding, tolerating diet, vss. Pt. verbalized understanding of medications prescribed and to follow up with MD in time ordered. IV lock removed and safety maintained.

## 2019-05-17 NOTE — ED PROVIDER NOTE - ATTENDING CONTRIBUTION TO CARE
MD Jon -- I have reviewed this note, the presenting symptoms, and the Chief Complaint and the History of Present Illness as documented, with the other care provider(s) and nurses on the patient care team. I have also reviewed this patient's past medical/surgical history and social/family history as reviewed and listed in this electronic medical record.      Presumptive lower GIB.  HDS after multiple episodes hematochezia, appears euvolemic and perfusing well.  Not on AC.  May represent diverticulitis but no abd pain or fever; Needs labs, fluids, CT-A A/P, reassess.  Likely admission.  Closely monitor as will likely require rpt CBC to trend.  --BMM

## 2019-05-17 NOTE — H&P ADULT - PROBLEM SELECTOR PLAN 1
pt w/ GI bleed, fount to have active bleed on CTA a/p over the hepatic flexure of colon.   -currently taking prep for colonoscopy  -GI is aware and will scope today  -NPO for now  -check CBC q8hr for now pt w/ GI bleed, fount to have active bleed on CTA a/p near hepatic flexure of colon.   -currently taking golytely prep for colonoscopy  -GI is aware and will scope today  -NPO for now  - ensure two large bore peripheral IV lines  -check CBC q8hr for now

## 2019-05-17 NOTE — ED PROVIDER NOTE - NS ED ROS FT
REVIEW OF SYSTEMS:  CONSTITUTIONAL: No weakness, fevers or chills  EYES/ENT: No visual changes;  No vertigo or throat pain   NECK: No pain or stiffness  RESPIRATORY: No cough, wheezing, hemoptysis; No shortness of breath  CARDIOVASCULAR: No chest pain or palpitations  GASTROINTESTINAL: No abdominal pain; nausea, vomiting;  diarrhea or constipation. No hemetemesis, +hematochezia  GENITOURINARY: No dysuria, frequency or hematuria  NEUROLOGICAL: No numbness or weakness  SKIN: No itching, burning, rashes, or lesions   All other review of systems is negative unless indicated above.

## 2019-05-17 NOTE — H&P ADULT - HISTORY OF PRESENT ILLNESS
49M no known pmh presents to the hospital from home for GI bleed. Pt reports that he was working when he had a bloody BM the night before admission. Pt denies feeling any abd pain at the time. Pt reports having a history of diverticulitis for which he had a partial bowel resection many years ago, however, he has not had blood BM's any other time. Pt denies fevers, chills, palpitations, chest pain, SOB, abd pain.     In the ED:   Vitals: 98.8, 132->78, 163/100->133/85, rr20 97% RA  Notable Labs and Imaging: hgb 15.3->13.8, CTA active bleed to hepatic flexure of colon  Given: 4L fluid, go-lightly 49M no known pmh presents to the hospital from home for GI bleed. Pt reports that he was working when he had a bloody BM the night before admission. Pt denies feeling any abd pain at the time. Pt reports having a history of diverticulitis for which he had a partial bowel resection many years ago.  Had had occasional blood on tissue paper, but he has not had bloody BM's any other time. Pt denies fevers, chills, palpitations, chest pain, SOB, abd pain. He denies using aspirin, NSAIDs or anticoagulation.  He had colonoscopy 2 years ago, was normal    In the ED:   Vitals: 98.8, 132->78, 163/100->133/85, rr20 97% RA  Notable Labs and Imaging: hgb 15.3->13.8, CTA active bleed to hepatic flexure of colon  Given: 4L fluid, go-lightly 49M no significant pmh presents to the hospital from home for GI bleed. Pt reports that he was working when he had a bloody BM the night before admission. Pt denies feeling any abd pain at the time. Pt reports having a history of diverticulitis for which he had a partial bowel resection many years ago.  Had had occasional blood on tissue paper, but he has not had bloody BM's any other time. Pt denies fevers, chills, palpitations, chest pain, SOB, abd pain. He denies using aspirin, NSAIDs or anticoagulation.  He had colonoscopy 2 years ago, was normal    In the ED:   Vitals: 98.8, 132->78, 163/100->133/85, rr20 97% RA  Notable Labs and Imaging: hgb 15.3->13.8, CTA active bleed to hepatic flexure of colon  Given: 4L fluid, go-lightly

## 2019-05-17 NOTE — DISCHARGE NOTE PROVIDER - CARE PROVIDER_API CALL
Tyler Scott)  Gastroenterology; Internal Medicine  [address], [address]  [place] [address]  Phone: [phone]  Fax: [phone]  Follow Up Time:

## 2019-05-17 NOTE — ED ADULT NURSE REASSESSMENT NOTE - NS ED NURSE REASSESS COMMENT FT1
Pt states he had a bloody bowel movement at this time. Pt VSS. Pt normal sinus rhythm on cardiac monitor, HR in 90's. MD Blevins made aware.

## 2019-05-17 NOTE — PROGRESS NOTE ADULT - ASSESSMENT
49M w/ PMH diverticulitis s/p sigmoid resection, morbid obesity s/p lap band, and umbilical hernia s/p repair w/ mesh who p/w acute colonic bleed with CT abd/pelvis revealing acute bleed in hepatic flexure.

## 2019-05-17 NOTE — H&P ADULT - NSHPREVIEWOFSYSTEMS_GEN_ALL_CORE
CONSTITUTIONAL: No weakness, fevers or chills  EYES/ENT: No visual changes;  No vertigo or throat pain   NECK: No pain or stiffness  RESPIRATORY: No cough, wheezing, hemoptysis; No shortness of breath  CARDIOVASCULAR: No chest pain or palpitations  GASTROINTESTINAL: BRBPR as per hpi. pt reports occational consitpation  GENITOURINARY: No dysuria, frequency or hematuria  NEUROLOGICAL:  No headache, dizziness, syncope.  MUSCULOSKELETAL:  No muscle, back pain, joint pain or stiffness.  HEMATOLOGIC:  No anemia, bleeding or bruising.  LYMPHATICS:  No enlarged nodes.  ENDOCRINOLOGIC:  No reports of sweating, cold or heat intolerance. No polyuria or polydipsia.  ALLERGIES:  No history of asthma, hives, eczema or rhinitis.

## 2019-05-17 NOTE — H&P ADULT - NSHPSOCIALHISTORY_GEN_ALL_CORE
Lives at home with wife and daughter. Works as EMS. Independent ADLs and IDLs.  Smoke: quit 1 year ago, smoked .5 ppd for 25 years  EtOH: occational  Drugs: denies

## 2019-05-17 NOTE — ED PROVIDER NOTE - FAMILY HISTORY
Father  Still living? Unknown  Family history of coronary artery disease, Age at diagnosis: Age Unknown     Mother  Still living? Unknown  Family history of hypertension, Age at diagnosis: Age Unknown

## 2019-05-17 NOTE — ED ADULT NURSE NOTE - CHPI ED NUR SYMPTOMS NEG
no burning urination/no chills/no vomiting/no dysuria/no hematuria/no diarrhea/no abdominal distension/no nausea/no fever

## 2019-05-17 NOTE — ED PROVIDER NOTE - PHYSICAL EXAMINATION
General: mildly anxious  HEENT: NCAT.  PERRL.  EOMI.  No scleral icterus or injection.  Moist MM.   Neck: Supple.  Full ROM.  No JVD.  No thyromegaly  Heart: tachycardic.  Normal S1 and S2.  No murmurs, rubs, or gallops.   Lungs: CTAB. No wheezes, crackles, or rhonchi.    Abdomen: BS+, obese, soft, NT/ND.  No organomegaly.  Skin: Warm and dry.  No rashes.  Extremities: No edema, clubbing, or cyanosis.    Neuro: A&Ox3.  CN II-XII intact.  5/5 strength in UE and LE b/l.  Tactile sensation intact in UE and LE b/l. \

## 2019-05-17 NOTE — ED PROVIDER NOTE - OBJECTIVE STATEMENT
Pt is a 49yM with pmh of diveritucilitis 10 years ago s/p colectomy (~10cms), no recurrence, gastric lap band ~5years ago lost 60lbs over 5 years, but gained 10 -15lbs back in the past few months, colonoscopy 2 years ago, normal per patient, came in d/t bloody bowel movementx1 at home at night, and x1 in the ED which was overtly bloody with blood clots. Pt states did not have bloody bms like this before, although he does sometimes get blood-on-toilet paper when he wipes. Pt denies any abdominal pain, no fevers/chills, nausea, vomiting, or diarrhea. Pt able to tolerate his normal diet well, no new dietary changes/medications/supplements, not on any blood thinners. Pt further denies SOB, CP, palpitations, dizziness, fatigue.

## 2019-05-17 NOTE — PROGRESS NOTE ADULT - PROBLEM SELECTOR PLAN 1
pt w/ GI bleed, fount to have active bleed on CTA a/p near hepatic flexure of colon.   -currently taking golytely prep for colonoscopy  -NPO for possible scope today   - two large bore peripheral IV lines  -c/w CBC q8h as patient still has bleeding

## 2019-05-17 NOTE — PROGRESS NOTE ADULT - ATTENDING COMMENTS
Seen, examined the patient  Resting in bed, clearing up bleeding P/R he added, no fever or abdominal pain, do not take NSAIDs  Had colonoscopy 2 yrs ago- no acute findings  -Reviewed labs, imaging   Hb 13 on IV hydration, NPO, had colon prep   on PPI  - appreciated GI eval, plans for colonoscopy today    Reviewed CTA abdomen- right colonic bleed.    DDx- for painless bleed- diverticular/angioectasia/colon mass/polyp,/anastamotic ulcer  - CBC q 12 hrs, vitals q 4 hrs

## 2019-05-17 NOTE — H&P ADULT - NSHPLABSRESULTS_GEN_ALL_CORE
15.3->13.8   10.5  )-----------( 289      ( 17 May 2019 02:20 )             40.2           137  |  100  |  17  ----------------------------<  99  4.3   |  23  |  0.82    Ca    9.8      17 May 2019 00:33    TPro  7.6  /  Alb  4.4  /  TBili  0.4  /  DBili  x   /  AST  28  /  ALT  55<H>  /  AlkPhos  69          Urinalysis Basic - ( 17 May 2019 01:01 )  Color: Light Yellow / Appearance: Clear / S.024 / pH: x  Gluc: x / Ketone: Negative  / Bili: Negative / Urobili: Negative   Blood: x / Protein: Trace / Nitrite: Negative   Leuk Esterase: Negative / RBC: 1 /hpf / WBC 1 /HPF   Sq Epi: x / Non Sq Epi: 0 /hpf / Bacteria: Negative    PT/INR - ( 17 May 2019 00:33 )   PT: 12.1 sec;   INR: 1.05 ratio       PTT - ( 17 May 2019 00:33 )  PTT:33.8 sec    Lactate Trend  Blood Gas Venous - Lactate: 1.6 mmoL/L (19 @ 00:33)    < from: CT Abdomen and Pelvis w/ IV Cont (19 @ 00:44) >    IMPRESSION:     There is active gastrointestinal bleeding in the right colon/hepatic   flexure. Findings discussed with Dr. Webb at 1:50 a.m. on May 17,   2019. Moderate to severe looping of the right and transverse segments.  No focal bowel wall thickening or diverticulitis.  Fatty liver.  Lap band in place.  Postsurgical changes in the sigmoid colon.  Ventral hernia mesh in place. Labs reviewed   15.3->13.8   10.5  )-----------( 289      ( 17 May 2019 02:20 )             40.2           137  |  100  |  17  ----------------------------<  99  4.3   |  23  |  0.82    Ca    9.8      17 May 2019 00:33    TPro  7.6  /  Alb  4.4  /  TBili  0.4  /  DBili  x   /  AST  28  /  ALT  55<H>  /  AlkPhos  69          Urinalysis Basic - ( 17 May 2019 01:01 )  Color: Light Yellow / Appearance: Clear / S.024 / pH: x  Gluc: x / Ketone: Negative  / Bili: Negative / Urobili: Negative   Blood: x / Protein: Trace / Nitrite: Negative   Leuk Esterase: Negative / RBC: 1 /hpf / WBC 1 /HPF   Sq Epi: x / Non Sq Epi: 0 /hpf / Bacteria: Negative    PT/INR - ( 17 May 2019 00:33 )   PT: 12.1 sec;   INR: 1.05 ratio       PTT - ( 17 May 2019 00:33 )  PTT:33.8 sec    Lactate Trend  Blood Gas Venous - Lactate: 1.6 mmoL/L (19 @ 00:33)    Imaging reviewed  < from: CT Abdomen and Pelvis w/ IV Cont (19 @ 00:44) >    IMPRESSION:     There is active gastrointestinal bleeding in the right colon/hepatic   flexure. Findings discussed with Dr. Webb at 1:50 a.m. on May 17,   2019. Moderate to severe looping of the right and transverse segments.  No focal bowel wall thickening or diverticulitis.  Fatty liver.  Lap band in place.  Postsurgical changes in the sigmoid colon.  Ventral hernia mesh in place.    EKG pending

## 2019-05-17 NOTE — ED PROVIDER NOTE - CLINICAL SUMMARY MEDICAL DECISION MAKING FREE TEXT BOX
Pt is a 49yM has active GI bleed, pt was observed to have overt blood in his bm here in the ED. CBC, CMP ordered, showed stable hgb. EKG ordered. CTA ordered, showed bleed in the R colon/hepatic flexture. Will trend H&H. Given 3L LR for dizziness after multiple episodes of bloody stools. Pt to be admitted to medicine, GI following, will start golytely per instructions, and they will scope in the morning.

## 2019-05-17 NOTE — DISCHARGE NOTE PROVIDER - NSDCCPCAREPLAN_GEN_ALL_CORE_FT
PRINCIPAL DISCHARGE DIAGNOSIS  Diagnosis: Acute lower gastrointestinal bleeding  Assessment and Plan of Treatment: You presented to the hospital with PRINCIPAL DISCHARGE DIAGNOSIS  Diagnosis: Acute lower gastrointestinal bleeding  Assessment and Plan of Treatment: You presented to the hospital with rectal bleeding. You received a CT angiogram of your abdominal vessels which was concerning for active bleeding in the area of the colon near your liver. Your hemoglobin levels were monitored closely. Gastroenterology was consulted who performed a colonoscopy which revealed moderate diverticulosis in your ascending colon. There was no sign of active bleeding. Your rectal bleeding      SECONDARY DISCHARGE DIAGNOSES  Diagnosis: Fatty liver  Assessment and Plan of Treatment: Fatty liver PRINCIPAL DISCHARGE DIAGNOSIS  Diagnosis: Acute lower gastrointestinal bleeding  Assessment and Plan of Treatment: You presented to the hospital with rectal bleeding. You received a CT angiogram of your abdominal vessels which was concerning for active bleeding in the area of the colon near your liver. Your hemoglobin levels were monitored closely. Gastroenterology was consulted who performed a colonoscopy which revealed moderate diverticulosis in your ascending colon. There was no sign of active bleeding. Your bloody bowel movements were likely caused by a spontaneous bleed in a diverticuli. You are currently no longer bleeding. Please follow up with your outpatient Gastroenterologist, Dr. Scott, within 1-2 weeks of your hospital discharge. If you have rectal bleeding or melena, please return to the Emergency Department immediately.      SECONDARY DISCHARGE DIAGNOSES  Diagnosis: Fatty liver  Assessment and Plan of Treatment: When you presented to the hospital, you received a CT abdomen which revealed findings consistent with a "fatty liver". Please follow up with your outpatient Primary care physician within 1-2 weeks of your hospital discharge. Although fatty liver does not affect the function of your liver presently, it can progress to cause permanent damage. Please eat a low fat diet and continue to exercise at last three times a day.

## 2019-05-17 NOTE — CONSULT NOTE ADULT - ATTENDING COMMENTS
49 M pmh diverticula c/b diverticulitis s/p colon resection, obesity s/p lap band presenting with acute hematochezia.  CTA showing bleeding at hepatic flexure.  Prepped overnight for colon.  Doing well at this time.  Plan for colonoscopy today.  H&H stable.  tachycardia improved with IVF    Impression:  1 Acute GIB - presumably diverticular  2. Obesity    Plan:  1) Colonoscopy today  2) NPO

## 2019-05-17 NOTE — PATIENT PROFILE ADULT - FALLEN IN THE PAST
Problem: Patient Care Overview  Goal: Plan of Care/Patient Progress Review  Outcome: Improving  Vital signs stable.  Working on breastfeeding and age appropriate voids and stools. Breastfeeding and latch improving throughout night. Brillion rash throughout body. Aquaphor given for diaper rash. Tsb HIR. Recheck needed after 1200. Passed CCHD, cord clamp removed, cord blood A+/-.  Continue to monitor and notify MD as needed.       no

## 2019-05-17 NOTE — PROGRESS NOTE ADULT - SUBJECTIVE AND OBJECTIVE BOX
Pre-Endoscopy Evaluation      Referring Physician: dr. maya abarca                                   Procedure: colonoscopy    Indication for Procedure: gib    Pertinent History: 49y male with history of diverticulitis s/p small bowel resection, lap band, presents with BRBPR with positive CTA.      Sedation by Anesthesia [X]    PAST MEDICAL & SURGICAL HISTORY:  Anxiety  Obesity, morbid, BMI 40.0-49.9  Sleep apnea  History of umbilical hernia repair  S/P small bowel resection      PMH of Gastroparesis [ ]  Gastric Surgery [x]  Gastric Outlet Obstruction [ ]    Allergies:    No Known Allergies    Intolerances:    Latex allergy: [ ] yes [X] no    Medications:MEDICATIONS  (STANDING):  sodium chloride 0.9%. 1000 milliLiter(s) (120 mL/Hr) IV Continuous <Continuous>    MEDICATIONS  (PRN):  acetaminophen   Tablet .. 650 milliGRAM(s) Oral every 6 hours PRN Mild Pain (1 - 3), Moderate Pain (4 - 6)      Smoking: [ ] yes  [x] no    AICD/PPM: [ ] yes   [x] no    Pertinent lab data:                        12.8   9.2   )-----------( 249      ( 17 May 2019 10:58 )             35.8     05-17    137  |  100  |  17  ----------------------------<  99  4.3   |  23  |  0.82    Ca    9.8      17 May 2019 00:33    TPro  7.6  /  Alb  4.4  /  TBili  0.4  /  DBili  x   /  AST  28  /  ALT  55<H>  /  AlkPhos  69  05-17    PT/INR - ( 17 May 2019 00:33 )   PT: 12.1 sec;   INR: 1.05 ratio      PTT - ( 17 May 2019 00:33 )  PTT:33.8 sec      < from: CT Abdomen and Pelvis w/ IV Cont (05.17.19 @ 00:44) >    IMPRESSION:     There is active gastrointestinal bleeding in the right colon/hepatic   flexure. Findings discussed with Dr. Webb at 1:50 a.m. on May 17,   2019. Moderate to severe looping of the right and transverse segments.  No focal bowel wall thickening or diverticulitis.  Fatty liver.  Lap band in place.  Postsurgical changes in the sigmoid colon.  Ventral hernia mesh in place.  ------------------------------------------------------------------------------------------------      Physical Examination:  Daily Height in cm: 172.72 (16 May 2019 23:38)    Daily   Vital Signs Last 24 Hrs  T(C): 36.4 (17 May 2019 09:21), Max: 37.2 (17 May 2019 00:18)  T(F): 97.6 (17 May 2019 09:21), Max: 99 (17 May 2019 00:18)  HR: 78 (17 May 2019 09:21) (78 - 132)  BP: 120/78 (17 May 2019 09:21) (120/78 - 163/108)  BP(mean): --  RR: 18 (17 May 2019 09:21) (15 - 20)  SpO2: 94% (17 May 2019 09:21) (94% - 100%)    Drug Dosing Weight  Height (cm): 172.72 (16 May 2019 23:38)  Weight (kg): 115.7 (16 May 2019 23:38)  BMI (kg/m2): 38.8 (16 May 2019 23:38)  BSA (m2): 2.27 (16 May 2019 23:38)    Constitutional: NAD     Neck:  No JVD    Respiratory: CTAB/L    Cardiovascular: S1 and S2    Gastrointestinal: BS+, soft, NT/ND    Extremities: No peripheral edema    Neurological: A/O x 3    : No Stevens    Skin: No rashes    Comments:    ASA Class: I [ ]  II [x]  III [ ]  IV [ ]    The patient is a suitable candidate for the planned procedure unless box checked [ ]  No, explain:
Patient is a 49y old  Male who presents with a chief complaint of Gi bleed (17 May 2019 06:18)    Sonia Mccray  Internal Medicine PGY-1  Pager # [phone]/ [phone]    SUBJECTIVE / OVERNIGHT EVENTS: Patient seen and examined. Denies abd pain, N/V. Has had 6 bloody BM since starting golytly prep.     OBJECTIVE:  Vital Signs Last 24 Hrs  T(C): 36.8 (17 May 2019 04:04), Max: 37.2 (17 May 2019 00:18)  T(F): 98.2 (17 May 2019 04:04), Max: 99 (17 May 2019 00:18)  HR: 92 (17 May 2019 04:04) (78 - 132)  BP: 143/83 (17 May 2019 04:04) (133/83 - 163/108)  BP(mean): --  RR: 16 (17 May 2019 04:04) (15 - 20)  SpO2: 96% (17 May 2019 04:04) (96% - 100%)    I&O's Summary    16 May 2019 07:01  -  17 May 2019 07:00  --------------------------------------------------------  IN: 225 mL / OUT: 0 mL / NET: 225 mL      Physical Examination:  GEN: obese man, NAD  PSYCH: A&Ox3, mood and affect appear appropriate   SKIN: intact, no e/o rash  NEURO: no focal neurologic deficits appreciated; CN II-XII intact, sensation intact B/L, motor 5/5 in all mm groups  EYES: PERRL, anicteric, EOMI, no vertical/horizontal nystagmus  HEAD: NC, AT  NECK: supple  RESPI: no accessory muscle use, B/L air entry, CTAB   CARDIO: S1 and S2, regular rate/rhythm, no LE edema B/L  ABD: soft, NT, ND, hyperactive bowel sounds   EXT: patient able to move all extremities spontaneously  VASC: peripheral pulses palpated    Labs:  CAPILLARY BLOOD GLUCOSE                              13.8   10.5  )-----------( 289      ( 17 May 2019 02:20 )             40.2     05-    137  |  100  |  17  ----------------------------<  99  4.3   |  23  |  0.82    Ca    9.8      17 May 2019 00:33    TPro  7.6  /  Alb  4.4  /  TBili  0.4  /  DBili  x   /  AST  28  /  ALT  55<H>  /  AlkPhos  69  05-17    PT/INR - ( 17 May 2019 00:33 )   PT: 12.1 sec;   INR: 1.05 ratio         PTT - ( 17 May 2019 00:33 )  PTT:33.8 sec        Urinalysis Basic - ( 17 May 2019 01:01 )    Color: Light Yellow / Appearance: Clear / S.024 / pH: x  Gluc: x / Ketone: Negative  / Bili: Negative / Urobili: Negative   Blood: x / Protein: Trace / Nitrite: Negative   Leuk Esterase: Negative / RBC: 1 /hpf / WBC 1 /HPF   Sq Epi: x / Non Sq Epi: 0 /hpf / Bacteria: Negative      Imaging Personally Reviewed:    Consultant(s) Notes Reviewed:   Care Discussed with Consultants/Other Providers:    MEDICATIONS  (STANDING):  sodium chloride 0.9%. 1000 milliLiter(s) (75 mL/Hr) IV Continuous <Continuous>    MEDICATIONS  (PRN):

## 2019-05-17 NOTE — DISCHARGE NOTE PROVIDER - HOSPITAL COURSE
HPI per admitting resident:         49M no significant pmh presents to the hospital from home for GI bleed. Pt reports that he was working when he had a bloody BM the night before admission. Pt denies feeling any abd pain at the time. Pt reports having a history of diverticulitis for which he had a partial bowel resection many years ago.  Had had occasional blood on tissue paper, but he has not had bloody BM's any other time. Pt denies fevers, chills, palpitations, chest pain, SOB, abd pain. He denies using aspirin, NSAIDs or anticoagulation.  He had colonoscopy 2 years ago, was normal        Hospital course:         In the ED:.     Vitals: 98.8, 132->78, 163/100->133/85, rr20 97% RA. Notable Labs and Imaging: hgb 15.3->13.8, CTA active bleed to hepatic flexure of colon. S/p 4L fluid, go-lightly. Patient admitted to medicine for further management.         Gastroenterology was consulted and performed a co HPI per admitting resident:         49M no significant pmh presents to the hospital from home for GI bleed. Pt reports that he was working when he had a bloody BM the night before admission. Pt denies feeling any abd pain at the time. Pt reports having a history of diverticulitis for which he had a partial bowel resection many years ago.  Had had occasional blood on tissue paper, but he has not had bloody BM's any other time. Pt denies fevers, chills, palpitations, chest pain, SOB, abd pain. He denies using aspirin, NSAIDs or anticoagulation.  He had colonoscopy 2 years ago, was normal        Hospital course:         In the ED:.     Vitals: 98.8, 132->78, 163/100->133/85, rr20 97% RA. Notable Labs and Imaging: hgb 15.3->13.8, CTA active bleed to hepatic flexure of colon. S/p 4L fluid, go-lightly. Patient admitted to medicine for further management.         Gastroenterology was consulted and performed a colonoscopy which revealed moderate diverticulosis in the ascending colon. No active bleeding was identified. Patient was

## 2019-05-17 NOTE — DISCHARGE NOTE NURSING/CASE MANAGEMENT/SOCIAL WORK - NSDCDPATPORTLINK_GEN_ALL_CORE
You can access the [facility] Patient Portal, offered by [facility], by registering with the following website: [email or web address]

## 2019-05-17 NOTE — H&P ADULT - NSHPPHYSICALEXAM_GEN_ALL_CORE
T(C): 36.8 (05-17-19 @ 04:04), Max: 37.2 (05-17-19 @ 00:18)  HR: 92 (05-17-19 @ 04:04) (78 - 132)  BP: 143/83 (05-17-19 @ 04:04) (133/83 - 163/108)  RR: 16 (05-17-19 @ 04:04) (15 - 20)  SpO2: 96% (05-17-19 @ 04:04) (96% - 100%)    GENERAL: NAD, well-developed  HEAD:  Atraumatic, Normocephalic  EYES: EOMI, PERRLA, conjunctiva and sclera clear  NECK: Supple, No JVD  CHEST/LUNG: Clear to auscultation bilaterally; No wheeze  HEART: Regular rate and rhythm; No murmurs, rubs, or gallops  ABDOMEN: Soft, Nontender, Nondistended; Bowel sounds present  EXTREMITIES:  2+ Peripheral Pulses, No clubbing, cyanosis, or edema  PSYCH: AAOx3  NEUROLOGY: 5/5 strength in all 4 extremities  SKIN: No rashes or lesions

## 2019-05-22 ENCOUNTER — EMERGENCY (EMERGENCY)
Facility: HOSPITAL | Age: 49
LOS: 1 days | Discharge: HOME | End: 2019-05-22
Attending: EMERGENCY MEDICINE
Payer: COMMERCIAL

## 2019-05-22 ENCOUNTER — INBOUND DOCUMENT (OUTPATIENT)
Age: 49
End: 2019-05-22

## 2019-05-22 VITALS
RESPIRATION RATE: 19 BRPM | DIASTOLIC BLOOD PRESSURE: 111 MMHG | SYSTOLIC BLOOD PRESSURE: 173 MMHG | HEART RATE: 80 BPM | OXYGEN SATURATION: 98 %

## 2019-05-22 DIAGNOSIS — Z98.89 OTHER SPECIFIED POSTPROCEDURAL STATES: Chronic | ICD-10-CM

## 2019-05-22 LAB
APTT BLD: 34 SEC — SIGNIFICANT CHANGE UP (ref 27.5–36.3)
BASOPHILS # BLD AUTO: 0.1 K/UL — SIGNIFICANT CHANGE UP (ref 0–0.2)
BASOPHILS NFR BLD AUTO: 0.4 % — SIGNIFICANT CHANGE UP (ref 0–2)
EOSINOPHIL # BLD AUTO: 0.2 K/UL — SIGNIFICANT CHANGE UP (ref 0–0.5)
EOSINOPHIL NFR BLD AUTO: 1.5 % — SIGNIFICANT CHANGE UP (ref 0–6)
HCT VFR BLD CALC: 39.6 % — SIGNIFICANT CHANGE UP (ref 39–50)
HGB BLD-MCNC: 14.1 G/DL — SIGNIFICANT CHANGE UP (ref 13–17)
INR BLD: 1.04 RATIO — SIGNIFICANT CHANGE UP (ref 0.88–1.16)
LYMPHOCYTES # BLD AUTO: 17.1 % — SIGNIFICANT CHANGE UP (ref 13–44)
LYMPHOCYTES # BLD AUTO: 2.3 K/UL — SIGNIFICANT CHANGE UP (ref 1–3.3)
MCHC RBC-ENTMCNC: 31.7 PG — SIGNIFICANT CHANGE UP (ref 27–34)
MCHC RBC-ENTMCNC: 35.5 GM/DL — SIGNIFICANT CHANGE UP (ref 32–36)
MCV RBC AUTO: 89.3 FL — SIGNIFICANT CHANGE UP (ref 80–100)
MONOCYTES # BLD AUTO: 0.9 K/UL — SIGNIFICANT CHANGE UP (ref 0–0.9)
MONOCYTES NFR BLD AUTO: 6.4 % — SIGNIFICANT CHANGE UP (ref 2–14)
NEUTROPHILS # BLD AUTO: 10.3 K/UL — HIGH (ref 1.8–7.4)
NEUTROPHILS NFR BLD AUTO: 74.6 % — SIGNIFICANT CHANGE UP (ref 43–77)
PLATELET # BLD AUTO: 329 K/UL — SIGNIFICANT CHANGE UP (ref 150–400)
PROTHROM AB SERPL-ACNC: 12 SEC — SIGNIFICANT CHANGE UP (ref 10–12.9)
RBC # BLD: 4.44 M/UL — SIGNIFICANT CHANGE UP (ref 4.2–5.8)
RBC # FLD: 11.7 % — SIGNIFICANT CHANGE UP (ref 10.3–14.5)
WBC # BLD: 13.7 K/UL — HIGH (ref 3.8–10.5)
WBC # FLD AUTO: 13.7 K/UL — HIGH (ref 3.8–10.5)

## 2019-05-22 PROCEDURE — 99284 EMERGENCY DEPT VISIT MOD MDM: CPT | Mod: 25

## 2019-05-22 NOTE — ED PROVIDER NOTE - OBJECTIVE STATEMENT
49 year-old male with history of diverticulosis, bowel resection for diverticulitis, lap band surgery 5 years ago (@ [place]) presents to the Emergency Department for abdominal pain.  Patient had recent admission for GI bleed; colonoscopy performed - bleeding had resolved and no interventions.  Patient mentions he woke up this AM and felt weak, lightheaded and generalized malaise.  Patient had BP done outpatient which fluctuating between high and low.  Patient reports generalized abdominal discomfort - no pain.  Last BM this AM.  No fevers, chills, nausea, vomiting, chest pain, shortness of breath. 49 year-old male with history of diverticulosis, bowel resection for diverticulitis, lap band surgery 5 years ago (@ [place]) presents to the Emergency Department for malaise.  Patient had recent admission for GI bleed; colonoscopy performed - bleeding had resolved and no interventions.  Patient mentions he woke up this AM and felt weak, lightheaded and generalized malaise.  Patient had BP done outpatient which fluctuating between high and low.  Patient reports generalized abdominal discomfort - no pain.  Last BM this AM.  No fevers, chills, nausea, vomiting, chest pain, shortness of breath.

## 2019-05-22 NOTE — ED PROVIDER NOTE - NSFOLLOWUPINSTRUCTIONS_ED_ALL_ED_FT
Follow-up with your Primary Care Physician within 2 - 3 days.  Please return to the Emergency Department immediately for any new, worsening or concerning symptoms.  Copy of your lab work, imaging and/or other testing performed in the ER is attached along with your discharge paperwork.  Please take this to your Primary Care Physician for further discussion, evaluation and comparison with your prior blood work results.    Can use Tylenol or Ibuprofen as per package directions for pain - use only as needed.  These are over-the-counter medications - please respect the warnings on the label.

## 2019-05-22 NOTE — ED ADULT NURSE NOTE - OBJECTIVE STATEMENT
pt BIBA and as per EMS, pt "was recently admitted to the hospital for GI bleed. he was d/c on friday and since then has been having normal BM's and was told to come to the ED is the bloody stools return. today he woke up not feeling right and lightheaded." pt AOx3 speaking in full complete sentences at present. pt denies any chest pain, SOB, abd. pain, n/v/d, bloody stools, urinary symptoms at present.

## 2019-05-22 NOTE — ED PROVIDER NOTE - ATTENDING CONTRIBUTION TO CARE
Patient feeling lightheaded today and fatigued, couldn't make it to work on time secondary to fatigue. Patient had recent gib with negative colonoscopy for bleeding. No f/c/sob/cp/d. Mild decrease in amount of bowel movements since colonoscopy.   GEN: NAD, awake, eyes open spontaneously  HEENT: NCAT, MMM, Trachea midline, normal conjunctiva, perrl  CHEST/LUNGS: Non-tachypneic, CTAB, bilateral breath sounds  CARDIAC: Non-tachycardic, normal perfusion  ABDOMEN: Soft, NTND, No rebound/guarding, obese by bmi  MSK: No edema, no gross deformity of extremities  SKIN: No rashes, no petechiae, no vesicles  NEURO: CN grossly intact, normal coordination, no focal motor or sensory deficits  PSYCH: Alert, appropriate, cooperative, with capacity and insight  In this physician's medical judgement based on clinical history and physical exam, patient not felling well and reported labile blood pressure 180 to 90 systolic, ekg within normal limits, no bloody bowel movements today, + stress and no acute gib (declines guaiac a this time) Risks, benefits and alternative therapies/imaging have been discussed with patient in detail. Patient declines the therapy/test and is aware of the potential consequences. Patient will follow up with their primary physician.   will get iv, cbc, cmp, eval/h/h. ekg and reassess  Will follow up on labs, analgesia, imaging, reassess and disposition as clinically indicated.

## 2019-05-22 NOTE — ED PROVIDER NOTE - CLINICAL SUMMARY MEDICAL DECISION MAKING FREE TEXT BOX
49 year-old male with history of diverticulosis, bowel resection for diverticulitis, lap band surgery 5 years ago (@ [place]) presents to the Emergency Department for malaise; eval for anemia, electrolyte abnormalities.  Patient otherwise shows no infectious signs/symptoms.

## 2019-05-22 NOTE — ED PROVIDER NOTE - PHYSICAL EXAMINATION
*Gen: NAD, AAO*3  *HEENT: NC/AT, MMM, airway patent, trachea midline  *CV: RRR, S1/S2 present, no murmurs/rubs/gallops  *Resp: no respiratory distress, LCTAB, no wheezing/rales/rhonchi  *Abd: non-distended, soft N/Tx4, no guarding or rigidity  *Neuro: no focal neuro deficits, moving all limbs appropriately  *Extremities: no gross deformity  *Skin: no rashes, no wounds   ~ Andres Rosenberg M.D.

## 2019-05-23 VITALS
TEMPERATURE: 98 F | HEART RATE: 79 BPM | OXYGEN SATURATION: 99 % | DIASTOLIC BLOOD PRESSURE: 104 MMHG | RESPIRATION RATE: 18 BRPM | SYSTOLIC BLOOD PRESSURE: 149 MMHG

## 2019-05-23 PROBLEM — F41.9 ANXIETY DISORDER, UNSPECIFIED: Chronic | Status: ACTIVE | Noted: 2019-05-17

## 2019-05-23 LAB
ALBUMIN SERPL ELPH-MCNC: 4.7 G/DL — SIGNIFICANT CHANGE UP (ref 3.3–5)
ALP SERPL-CCNC: 64 U/L — SIGNIFICANT CHANGE UP (ref 40–120)
ALT FLD-CCNC: 47 U/L — HIGH (ref 10–45)
ANION GAP SERPL CALC-SCNC: 11 MMOL/L — SIGNIFICANT CHANGE UP (ref 5–17)
AST SERPL-CCNC: 26 U/L — SIGNIFICANT CHANGE UP (ref 10–40)
BILIRUB SERPL-MCNC: 0.4 MG/DL — SIGNIFICANT CHANGE UP (ref 0.2–1.2)
BLD GP AB SCN SERPL QL: NEGATIVE — SIGNIFICANT CHANGE UP
BUN SERPL-MCNC: 11 MG/DL — SIGNIFICANT CHANGE UP (ref 7–23)
CALCIUM SERPL-MCNC: 9.8 MG/DL — SIGNIFICANT CHANGE UP (ref 8.4–10.5)
CHLORIDE SERPL-SCNC: 104 MMOL/L — SIGNIFICANT CHANGE UP (ref 96–108)
CO2 SERPL-SCNC: 24 MMOL/L — SIGNIFICANT CHANGE UP (ref 22–31)
CREAT SERPL-MCNC: 0.77 MG/DL — SIGNIFICANT CHANGE UP (ref 0.5–1.3)
GLUCOSE SERPL-MCNC: 101 MG/DL — HIGH (ref 70–99)
POTASSIUM SERPL-MCNC: 4 MMOL/L — SIGNIFICANT CHANGE UP (ref 3.5–5.3)
POTASSIUM SERPL-SCNC: 4 MMOL/L — SIGNIFICANT CHANGE UP (ref 3.5–5.3)
PROT SERPL-MCNC: 7.5 G/DL — SIGNIFICANT CHANGE UP (ref 6–8.3)
RH IG SCN BLD-IMP: POSITIVE — SIGNIFICANT CHANGE UP
SODIUM SERPL-SCNC: 139 MMOL/L — SIGNIFICANT CHANGE UP (ref 135–145)

## 2019-05-23 PROCEDURE — 85610 PROTHROMBIN TIME: CPT

## 2019-05-23 PROCEDURE — 80053 COMPREHEN METABOLIC PANEL: CPT

## 2019-05-23 PROCEDURE — 86850 RBC ANTIBODY SCREEN: CPT

## 2019-05-23 PROCEDURE — 86900 BLOOD TYPING SEROLOGIC ABO: CPT

## 2019-05-23 PROCEDURE — 99283 EMERGENCY DEPT VISIT LOW MDM: CPT

## 2019-05-23 PROCEDURE — 93005 ELECTROCARDIOGRAM TRACING: CPT

## 2019-05-23 PROCEDURE — 85027 COMPLETE CBC AUTOMATED: CPT

## 2019-05-23 PROCEDURE — 86901 BLOOD TYPING SEROLOGIC RH(D): CPT

## 2019-05-23 PROCEDURE — 85730 THROMBOPLASTIN TIME PARTIAL: CPT

## 2019-05-23 RX ORDER — SODIUM CHLORIDE 9 MG/ML
1000 INJECTION INTRAMUSCULAR; INTRAVENOUS; SUBCUTANEOUS ONCE
Refills: 0 | Status: COMPLETED | OUTPATIENT
Start: 2019-05-23 | End: 2019-05-23

## 2019-05-23 RX ADMIN — SODIUM CHLORIDE 2000 MILLILITER(S): 9 INJECTION INTRAMUSCULAR; INTRAVENOUS; SUBCUTANEOUS at 00:00

## 2019-05-24 ENCOUNTER — APPOINTMENT (OUTPATIENT)
Dept: INTERNAL MEDICINE | Facility: CLINIC | Age: 49
End: 2019-05-24
Payer: COMMERCIAL

## 2019-05-24 VITALS
SYSTOLIC BLOOD PRESSURE: 130 MMHG | BODY MASS INDEX: 40.16 KG/M2 | RESPIRATION RATE: 17 BRPM | HEIGHT: 68 IN | WEIGHT: 265 LBS | DIASTOLIC BLOOD PRESSURE: 92 MMHG | OXYGEN SATURATION: 97 % | HEART RATE: 87 BPM

## 2019-05-24 VITALS — SYSTOLIC BLOOD PRESSURE: 143 MMHG | DIASTOLIC BLOOD PRESSURE: 92 MMHG

## 2019-05-24 PROCEDURE — 99495 TRANSJ CARE MGMT MOD F2F 14D: CPT

## 2019-05-24 RX ORDER — PAROXETINE HYDROCHLORIDE 10 MG/1
10 TABLET, FILM COATED ORAL DAILY
Qty: 30 | Refills: 3 | Status: DISCONTINUED | COMMUNITY
Start: 2019-02-25 | End: 2019-05-24

## 2019-05-29 ENCOUNTER — APPOINTMENT (OUTPATIENT)
Age: 49
End: 2019-05-29
Payer: COMMERCIAL

## 2019-05-29 VITALS
WEIGHT: 265 LBS | TEMPERATURE: 98.4 F | DIASTOLIC BLOOD PRESSURE: 80 MMHG | HEIGHT: 68 IN | BODY MASS INDEX: 40.16 KG/M2 | OXYGEN SATURATION: 97 % | HEART RATE: 98 BPM | SYSTOLIC BLOOD PRESSURE: 120 MMHG

## 2019-05-29 PROCEDURE — 99213 OFFICE O/P EST LOW 20 MIN: CPT

## 2019-05-29 PROCEDURE — 99204 OFFICE O/P NEW MOD 45 MIN: CPT

## 2019-06-10 ENCOUNTER — APPOINTMENT (OUTPATIENT)
Dept: INTERNAL MEDICINE | Facility: CLINIC | Age: 49
End: 2019-06-10
Payer: COMMERCIAL

## 2019-06-10 ENCOUNTER — APPOINTMENT (OUTPATIENT)
Dept: OTOLARYNGOLOGY | Facility: CLINIC | Age: 49
End: 2019-06-10

## 2019-06-10 ENCOUNTER — APPOINTMENT (OUTPATIENT)
Dept: CARDIOLOGY | Facility: CLINIC | Age: 49
End: 2019-06-10

## 2019-06-10 VITALS
HEIGHT: 68 IN | TEMPERATURE: 98.4 F | HEART RATE: 80 BPM | SYSTOLIC BLOOD PRESSURE: 131 MMHG | OXYGEN SATURATION: 98 % | RESPIRATION RATE: 17 BRPM | DIASTOLIC BLOOD PRESSURE: 89 MMHG | BODY MASS INDEX: 40.62 KG/M2 | WEIGHT: 268 LBS

## 2019-06-10 PROCEDURE — 99214 OFFICE O/P EST MOD 30 MIN: CPT

## 2019-06-18 NOTE — ASSESSMENT
[FreeTextEntry1] : Impression and plan\par \par Patient comes today after recent hospital discharge he was admitted for  lower bleeding presumed to be a diverticular origin based upon CT scan results as well as colonoscopy results demonstrating extensive diverticulosis. Patient apparently was not bleeding at the time of colonoscopy. Since discharge the patient has been feeling well but is overall fatigued. He has a lot of stress on him at this point. I told the patient that no further testing would be required at this time as he is currently not bleeding and has had a fairly thorough investigation. Patient was told that he should come back here on an as needed basis and will be seen on a same-day basis if necessary. Patient is encouraged to watch out for signs of diverticulitis and lower GI bleeding. 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 Further suggestions will follow as needed.

## 2019-06-18 NOTE — HISTORY OF PRESENT ILLNESS
[FreeTextEntry1] : The patient presents to the office today for discussion regarding recent hospitalization. Patient is a 49-year-old male who was admitted to [facility] for lower GI bleeding. Patient underwent in-hospital testing including CT scan and colonoscopy. CT scan demonstrated a bleeding source in the right colon which was not confirmed with colonoscopy. The patient has extensive diverticulosis and has had previous resection\par \par The patient was discharged feeling well but went back to the emergency room with feelings of fatigue. Repeat CBC performed showed normal results. Patient is currently not bleeding but feels generalized upset stomach. He is under a great deal of stress to his home situation. He currently denies nausea vomiting fever chills melena rectal bleeding weight loss. He has no cardiac or pulmonary complaints.

## 2019-08-06 ENCOUNTER — TRANSCRIPTION ENCOUNTER (OUTPATIENT)
Age: 49
End: 2019-08-06

## 2019-08-26 ENCOUNTER — APPOINTMENT (OUTPATIENT)
Dept: INTERNAL MEDICINE | Facility: CLINIC | Age: 49
End: 2019-08-26
Payer: COMMERCIAL

## 2019-08-26 VITALS
HEIGHT: 68 IN | DIASTOLIC BLOOD PRESSURE: 96 MMHG | OXYGEN SATURATION: 96 % | BODY MASS INDEX: 41.37 KG/M2 | HEART RATE: 77 BPM | WEIGHT: 273 LBS | SYSTOLIC BLOOD PRESSURE: 151 MMHG | TEMPERATURE: 97.9 F

## 2019-08-26 PROCEDURE — 99396 PREV VISIT EST AGE 40-64: CPT

## 2019-08-27 LAB
25(OH)D3 SERPL-MCNC: 21.9 NG/ML
ALBUMIN SERPL ELPH-MCNC: 4.5 G/DL
ALP BLD-CCNC: 65 U/L
ALT SERPL-CCNC: 46 U/L
ANION GAP SERPL CALC-SCNC: 13 MMOL/L
APPEARANCE: CLEAR
AST SERPL-CCNC: 21 U/L
BASOPHILS # BLD AUTO: 0.08 K/UL
BASOPHILS NFR BLD AUTO: 0.9 %
BILIRUB SERPL-MCNC: 0.3 MG/DL
BILIRUBIN URINE: NEGATIVE
BLOOD URINE: NEGATIVE
BUN SERPL-MCNC: 16 MG/DL
CALCIUM SERPL-MCNC: 10 MG/DL
CHLORIDE SERPL-SCNC: 105 MMOL/L
CHOLEST SERPL-MCNC: 201 MG/DL
CHOLEST/HDLC SERPL: 4.5 RATIO
CO2 SERPL-SCNC: 21 MMOL/L
COLOR: YELLOW
CREAT SERPL-MCNC: 0.72 MG/DL
EOSINOPHIL # BLD AUTO: 0.27 K/UL
EOSINOPHIL NFR BLD AUTO: 3.1 %
ESTIMATED AVERAGE GLUCOSE: 120 MG/DL
GLUCOSE QUALITATIVE U: NEGATIVE
GLUCOSE SERPL-MCNC: 105 MG/DL
HBA1C MFR BLD HPLC: 5.8 %
HCT VFR BLD CALC: 45.4 %
HDLC SERPL-MCNC: 45 MG/DL
HGB BLD-MCNC: 15.2 G/DL
IMM GRANULOCYTES NFR BLD AUTO: 0.6 %
KETONES URINE: NEGATIVE
LDLC SERPL CALC-MCNC: 127 MG/DL
LEUKOCYTE ESTERASE URINE: NEGATIVE
LYMPHOCYTES # BLD AUTO: 2.2 K/UL
LYMPHOCYTES NFR BLD AUTO: 25.3 %
MAN DIFF?: NORMAL
MCHC RBC-ENTMCNC: 29.6 PG
MCHC RBC-ENTMCNC: 33.5 GM/DL
MCV RBC AUTO: 88.3 FL
MONOCYTES # BLD AUTO: 0.83 K/UL
MONOCYTES NFR BLD AUTO: 9.6 %
NEUTROPHILS # BLD AUTO: 5.25 K/UL
NEUTROPHILS NFR BLD AUTO: 60.5 %
NITRITE URINE: NEGATIVE
PH URINE: 5.5
PLATELET # BLD AUTO: 279 K/UL
POTASSIUM SERPL-SCNC: 4.5 MMOL/L
PROT SERPL-MCNC: 7.2 G/DL
PROTEIN URINE: NORMAL
PSA FREE FLD-MCNC: 24 %
PSA FREE SERPL-MCNC: 0.17 NG/ML
PSA SERPL-MCNC: 0.72 NG/ML
RBC # BLD: 5.14 M/UL
RBC # FLD: 12.7 %
SODIUM SERPL-SCNC: 139 MMOL/L
SPECIFIC GRAVITY URINE: >=1.03
TRIGL SERPL-MCNC: 146 MG/DL
TSH SERPL-ACNC: 2.08 UIU/ML
UROBILINOGEN URINE: NORMAL
WBC # FLD AUTO: 8.68 K/UL

## 2019-09-03 LAB
TESTOST BND SERPL-MCNC: 4.3 PG/ML
TESTOST SERPL-MCNC: 260.1 NG/DL

## 2019-09-13 ENCOUNTER — EMERGENCY (EMERGENCY)
Facility: HOSPITAL | Age: 49
LOS: 1 days | Discharge: HOME | End: 2019-09-13
Attending: PERSONAL EMERGENCY RESPONSE ATTENDANT
Payer: COMMERCIAL

## 2019-09-13 VITALS
HEART RATE: 75 BPM | DIASTOLIC BLOOD PRESSURE: 90 MMHG | OXYGEN SATURATION: 100 % | RESPIRATION RATE: 19 BRPM | SYSTOLIC BLOOD PRESSURE: 141 MMHG

## 2019-09-13 VITALS
HEART RATE: 85 BPM | SYSTOLIC BLOOD PRESSURE: 156 MMHG | DIASTOLIC BLOOD PRESSURE: 90 MMHG | HEIGHT: 68 IN | WEIGHT: 255.96 LBS | OXYGEN SATURATION: 97 % | RESPIRATION RATE: 18 BRPM

## 2019-09-13 DIAGNOSIS — Z98.89 OTHER SPECIFIED POSTPROCEDURAL STATES: Chronic | ICD-10-CM

## 2019-09-13 LAB
ALBUMIN SERPL ELPH-MCNC: 4.6 G/DL — SIGNIFICANT CHANGE UP (ref 3.3–5)
ALP SERPL-CCNC: 67 U/L — SIGNIFICANT CHANGE UP (ref 40–120)
ALT FLD-CCNC: 45 U/L — SIGNIFICANT CHANGE UP (ref 10–45)
ANION GAP SERPL CALC-SCNC: 15 MMOL/L — SIGNIFICANT CHANGE UP (ref 5–17)
AST SERPL-CCNC: 21 U/L — SIGNIFICANT CHANGE UP (ref 10–40)
BASOPHILS # BLD AUTO: 0 K/UL — SIGNIFICANT CHANGE UP (ref 0–0.2)
BASOPHILS NFR BLD AUTO: 0.4 % — SIGNIFICANT CHANGE UP (ref 0–2)
BILIRUB SERPL-MCNC: 0.3 MG/DL — SIGNIFICANT CHANGE UP (ref 0.2–1.2)
BUN SERPL-MCNC: 13 MG/DL — SIGNIFICANT CHANGE UP (ref 7–23)
CALCIUM SERPL-MCNC: 10.1 MG/DL — SIGNIFICANT CHANGE UP (ref 8.4–10.5)
CHLORIDE SERPL-SCNC: 102 MMOL/L — SIGNIFICANT CHANGE UP (ref 96–108)
CO2 SERPL-SCNC: 22 MMOL/L — SIGNIFICANT CHANGE UP (ref 22–31)
CREAT SERPL-MCNC: 0.73 MG/DL — SIGNIFICANT CHANGE UP (ref 0.5–1.3)
EOSINOPHIL # BLD AUTO: 0.2 K/UL — SIGNIFICANT CHANGE UP (ref 0–0.5)
EOSINOPHIL NFR BLD AUTO: 2.2 % — SIGNIFICANT CHANGE UP (ref 0–6)
GLUCOSE SERPL-MCNC: 116 MG/DL — HIGH (ref 70–99)
HCT VFR BLD CALC: 45.4 % — SIGNIFICANT CHANGE UP (ref 39–50)
HGB BLD-MCNC: 15.2 G/DL — SIGNIFICANT CHANGE UP (ref 13–17)
LYMPHOCYTES # BLD AUTO: 2.3 K/UL — SIGNIFICANT CHANGE UP (ref 1–3.3)
LYMPHOCYTES # BLD AUTO: 21.3 % — SIGNIFICANT CHANGE UP (ref 13–44)
MCHC RBC-ENTMCNC: 29.1 PG — SIGNIFICANT CHANGE UP (ref 27–34)
MCHC RBC-ENTMCNC: 33.5 GM/DL — SIGNIFICANT CHANGE UP (ref 32–36)
MCV RBC AUTO: 86.9 FL — SIGNIFICANT CHANGE UP (ref 80–100)
MONOCYTES # BLD AUTO: 0.8 K/UL — SIGNIFICANT CHANGE UP (ref 0–0.9)
MONOCYTES NFR BLD AUTO: 7.8 % — SIGNIFICANT CHANGE UP (ref 2–14)
NEUTROPHILS # BLD AUTO: 7.3 K/UL — SIGNIFICANT CHANGE UP (ref 1.8–7.4)
NEUTROPHILS NFR BLD AUTO: 68.2 % — SIGNIFICANT CHANGE UP (ref 43–77)
PLATELET # BLD AUTO: 301 K/UL — SIGNIFICANT CHANGE UP (ref 150–400)
POTASSIUM SERPL-MCNC: 3.9 MMOL/L — SIGNIFICANT CHANGE UP (ref 3.5–5.3)
POTASSIUM SERPL-SCNC: 3.9 MMOL/L — SIGNIFICANT CHANGE UP (ref 3.5–5.3)
PROT SERPL-MCNC: 7.7 G/DL — SIGNIFICANT CHANGE UP (ref 6–8.3)
RBC # BLD: 5.22 M/UL — SIGNIFICANT CHANGE UP (ref 4.2–5.8)
RBC # FLD: 12.1 % — SIGNIFICANT CHANGE UP (ref 10.3–14.5)
SODIUM SERPL-SCNC: 139 MMOL/L — SIGNIFICANT CHANGE UP (ref 135–145)
TROPONIN T, HIGH SENSITIVITY RESULT: 6 NG/L — SIGNIFICANT CHANGE UP (ref 0–51)
TROPONIN T, HIGH SENSITIVITY RESULT: <6 NG/L — SIGNIFICANT CHANGE UP (ref 0–51)
WBC # BLD: 10.8 K/UL — HIGH (ref 3.8–10.5)
WBC # FLD AUTO: 10.8 K/UL — HIGH (ref 3.8–10.5)

## 2019-09-13 PROCEDURE — 85027 COMPLETE CBC AUTOMATED: CPT

## 2019-09-13 PROCEDURE — 71045 X-RAY EXAM CHEST 1 VIEW: CPT

## 2019-09-13 PROCEDURE — 80053 COMPREHEN METABOLIC PANEL: CPT

## 2019-09-13 PROCEDURE — 99284 EMERGENCY DEPT VISIT MOD MDM: CPT | Mod: 25

## 2019-09-13 PROCEDURE — 71045 X-RAY EXAM CHEST 1 VIEW: CPT | Mod: 26

## 2019-09-13 PROCEDURE — 99285 EMERGENCY DEPT VISIT HI MDM: CPT | Mod: 25

## 2019-09-13 PROCEDURE — 93005 ELECTROCARDIOGRAM TRACING: CPT

## 2019-09-13 PROCEDURE — 84484 ASSAY OF TROPONIN QUANT: CPT

## 2019-09-13 PROCEDURE — 93010 ELECTROCARDIOGRAM REPORT: CPT

## 2019-09-13 NOTE — ED ADULT NURSE NOTE - OBJECTIVE STATEMENT
48 y/o Male presenting to the by EMS, A&Ox3, complaining of substernal chest discomfort x 3 hours that started while sitting at a desk, pain non radiating. Pt had an episode of dizziness yesterday but denies dizziness during this episode. Pt found to have PVCs on EKG. Pt denies shortness of breath, headache, blurry vision, N/V/D, syncope, calf pain. Pt given 162mg of aspirin with no relief of discomfort. EKG completed upon arrival and given to MD Jackson, pt placed on cardiac monitor showing NSR to the to 70's. Pt quit smoking about a year ago. Abdomen soft, nontender, nondistended. Steady gait noted. Safety and comfort measures provided and maintained, bed in lowest position, side rails up for safety. IV in place by EMS, flushes and blood return present. MD Jackson at bedside. Awaiting MD orders.

## 2019-09-13 NOTE — ED PROVIDER NOTE - PROGRESS NOTE DETAILS
Sign out from night team. Patient with normal CXR, and initial labs wnl (trop 6). Will rpt trop at 9:30 (for a 3hr rpt), and re-assess. Stable, no acute distress.   Young Aceves MD, PGY3 Emergency Medicine Rpt trop <6. Resting in bed, no acute distress. Will d/c home w/ strict instructions to f/u with primary medical doctor and cardiology.   Spoke with patient extensively regarding current differential diagnosis for ongoing symptoms, and patient acknowledged understanding. All questions and concerns have been addressed with the patient. I have discussed the plan for care and patient is in agreement. Patient is instructed to follow up with Primary Care Provider, and has been given strict return precautions.  Young Aceves MD, PGY3 Emergency Medicine ATTG: : patient endorsed to me by Dr. Jackson at 7 am. awaiting repeat trop. no longer having flutter/ palp feelings. no further episodes in ED on tele. rec close follow up with cardio and will likely need holter. provided with alt cards follow up here in [place].

## 2019-09-13 NOTE — ED PROVIDER NOTE - OBJECTIVE STATEMENT
Attending MD Jackson.  Pt is a 48 yo male with pmhx of divertic/itis and osis remotely, borderline htn, GI bleed several mos ago (told divertic) hx of tobacco use ceased 1 yr ago, obestiy who presents to ED currently with complaint of palpitations x 2-3 hours with discreet feeling of palp when noted to have PVC's.  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  States had transient episode of light-headedness yesterday morning but has not noted same since.  Denies chest pain, denies syncope, denies diaphoresis, nausea, vomiting.  No family hx of heart disease at a young age (<55).  No current cardiologist.  No recent stress test.  Pt is alert and oriented x 3 on arrival.  12 lead EKG non-actionable.  No leg pain/swelling/recent prolonged immobility. Attending MD Jackson.  Pt is a 50 yo male with pmhx of divertic/itis and osis remotely, borderline htn, GI bleed several mos ago (told divertic) hx of tobacco use ceased 1 yr ago, obestiy who presents to ED currently with complaint of palpitations x 2-3 hours with discreet feeling of palp when noted to have PVC's.  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  States had transient episode of light-headedness yesterday morning but has not noted same since.  Denies chest pain, denies syncope, denies diaphoresis, nausea, vomiting.  No family hx of heart disease at a young age (<55).  No current cardiologist.  No recent stress test.  Pt is alert and oriented x 3 on arrival.  12 lead EKG non-actionable.  No leg pain/swelling/recent prolonged immobility.  Pt denies illicit drug use or frequent EtOH.

## 2019-09-13 NOTE — ED PROVIDER NOTE - NSFOLLOWUPCLINICS_GEN_ALL_ED_FT
[facility]  Cardiology  [address] [place] [id number]  Phone: [phone]  Fax:   Follow Up Time:

## 2019-09-13 NOTE — ED ADULT NURSE NOTE - NSIMPLEMENTINTERV_GEN_ALL_ED
Implemented All Universal Safety Interventions:  [place] to call system. Call bell, personal items and telephone within reach. Instruct patient to call for assistance. Room bathroom lighting operational. Non-slip footwear when patient is off stretcher. Physically safe environment: no spills, clutter or unnecessary equipment. Stretcher in lowest position, wheels locked, appropriate side rails in place.

## 2019-09-13 NOTE — ED PROVIDER NOTE - NSFOLLOWUPINSTRUCTIONS_ED_ALL_ED_FT
Please follow up with your primary care physician for further care and evaluation, as well as with Cardiology  If testing was performed in the Emergency Department, please bring copies of all test results to your doctor.  Please continue to take all home medications as previously prescribed.     Return to hospital for any new or concerning symptoms, including but not limited to: fevers, chills, nausea, vomiting, headache, dizziness, lightheadedness, chest pain, shortness of breath, difficulty breathing, abdominal pain, weakness, or any other new or concerning symptoms.

## 2019-09-13 NOTE — ED PROVIDER NOTE - PATIENT PORTAL LINK FT
You can access the FollowMyHealth Patient Portal offered by [facility] by registering at the following website: [email or web address]. By joining [employer]’s FollowMyHealth portal, you will also be able to view your health information using other applications (apps) compatible with our system.

## 2019-09-13 NOTE — ED ADULT TRIAGE NOTE - CHIEF COMPLAINT QUOTE
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 no nausea, no vomiting  brief episode of brief dizziness x1 day.

## 2019-09-13 NOTE — ED ADULT NURSE NOTE - NSSUSCREENINGQ1_ED_ALL_ED
No
I have personally seen and examined this patient. I have fully participated in the care of this patient. I have reviewed all pertinent clinical information, including history physical exam, plan and the Resident's note and agree except as noted

## 2019-09-24 ENCOUNTER — APPOINTMENT (OUTPATIENT)
Dept: CARDIOLOGY | Facility: CLINIC | Age: 49
End: 2019-09-24
Payer: COMMERCIAL

## 2019-09-24 ENCOUNTER — NON-APPOINTMENT (OUTPATIENT)
Age: 49
End: 2019-09-24

## 2019-09-24 VITALS
BODY MASS INDEX: 41.07 KG/M2 | HEART RATE: 82 BPM | DIASTOLIC BLOOD PRESSURE: 90 MMHG | OXYGEN SATURATION: 97 % | WEIGHT: 271 LBS | SYSTOLIC BLOOD PRESSURE: 149 MMHG | HEIGHT: 68 IN

## 2019-09-24 VITALS — SYSTOLIC BLOOD PRESSURE: 156 MMHG | DIASTOLIC BLOOD PRESSURE: 94 MMHG

## 2019-09-24 DIAGNOSIS — E66.9 OBESITY, UNSPECIFIED: ICD-10-CM

## 2019-09-24 DIAGNOSIS — Z01.810 ENCOUNTER FOR PREPROCEDURAL CARDIOVASCULAR EXAMINATION: ICD-10-CM

## 2019-09-24 DIAGNOSIS — I49.3 VENTRICULAR PREMATURE DEPOLARIZATION: ICD-10-CM

## 2019-09-24 DIAGNOSIS — Z87.898 PERSONAL HISTORY OF OTHER SPECIFIED CONDITIONS: ICD-10-CM

## 2019-09-24 DIAGNOSIS — Z01.811 ENCOUNTER FOR PREPROCEDURAL RESPIRATORY EXAMINATION: ICD-10-CM

## 2019-09-24 PROCEDURE — 99204 OFFICE O/P NEW MOD 45 MIN: CPT

## 2019-09-24 PROCEDURE — 93000 ELECTROCARDIOGRAM COMPLETE: CPT

## 2019-09-24 NOTE — REVIEW OF SYSTEMS
[Recent Weight Loss (___ Lbs)] : no recent weight loss [Shortness Of Breath] : no shortness of breath [see HPI] : see HPI [Dyspnea on exertion] : not dyspnea during exertion [Chest  Pressure] : no chest pressure [Chest Pain] : no chest pain [Lower Ext Edema] : no extremity edema [Palpitations] : palpitations [Dizziness] : no dizziness [Under Stress] : under stress [Anxiety] : anxiety [Negative] : Heme/Lymph

## 2019-09-24 NOTE — PHYSICAL EXAM
[Well Groomed] : well groomed [General Appearance - In No Acute Distress] : no acute distress [Eyelids - No Xanthelasma] : the eyelids demonstrated no xanthelasmas [No Oral Cyanosis] : no oral cyanosis [Heart Rate And Rhythm] : heart rate and rhythm were normal [Heart Sounds] : normal S1 and S2 [Respiration, Rhythm And Depth] : normal respiratory rhythm and effort [Bowel Sounds] : normal bowel sounds [Abdomen Soft] : soft [Abnormal Walk] : normal gait [FreeTextEntry1] : obese abdomen [] : no rash [Cyanosis, Localized] : no localized cyanosis [Oriented To Time, Place, And Person] : oriented to person, place, and time [Impaired Insight] : insight and judgment were intact [Affect] : the affect was normal [Mood] : the mood was normal

## 2019-09-24 NOTE — DISCUSSION/SUMMARY
[FreeTextEntry1] : \par Premature ventricular complexes / palpitations:  Single episode on September 13, 2019 with no recurrence; I offered the patient beta-blockade (given the presence of palpitations, PVCs, and hypertension -- but he declined because of concern about the potential for erectile dysfunction); return for echocardiography.\par \par Hypertension: Blood pressure is poorly controlled without antihypertensive therapy and has been high on multiple recent measurements; patient agreeable to a trial of amlodipine 5 mg daily -- he will monitor blood pressure in the outpatient setting and we will discuss efficacy in several weeks.  We also discussed lifestyle modification -- diet, exercise, and weight loss.

## 2019-09-24 NOTE — HISTORY OF PRESENT ILLNESS
[FreeTextEntry1] : Ghulam Marx is a 49-year-old man with a history of obesity, previous lap band surgery, hypertension (previously treated with losartan but discontinued), and anxiety who presents for cardiac examination following a recent emergency room visit for palpitations.  He has no history of heart disease but sought care at the [facility] ER on September 13 after experiencing palpitations at work - an ECG by EMS reportedly revealed frequent premature ventricular complexes.  In the emergency Department his symptoms resolved, resting 12-lead ECG and routine blood work were normal - he was discharged after a period of observation; symptoms have not recurred.  He does not exercise but is active with work and activities of daily living; denies angina.  He is unable to identify exacerbating or alleviating factors for his palpitations -- admits to occasionally overindulging and caffeine during his shift. He feels well today and says he would like to avoid ECG monitoring.

## 2019-10-08 ENCOUNTER — TRANSCRIPTION ENCOUNTER (OUTPATIENT)
Age: 49
End: 2019-10-08

## 2019-10-29 ENCOUNTER — APPOINTMENT (OUTPATIENT)
Dept: CARDIOLOGY | Facility: CLINIC | Age: 49
End: 2019-10-29

## 2020-04-17 ENCOUNTER — EMERGENCY (EMERGENCY)
Facility: HOSPITAL | Age: 50
LOS: 1 days | Discharge: HOME | End: 2020-04-17
Attending: EMERGENCY MEDICINE
Payer: COMMERCIAL

## 2020-04-17 VITALS
OXYGEN SATURATION: 98 % | HEIGHT: 68 IN | HEART RATE: 104 BPM | DIASTOLIC BLOOD PRESSURE: 112 MMHG | TEMPERATURE: 97 F | WEIGHT: 253.09 LBS | SYSTOLIC BLOOD PRESSURE: 169 MMHG | RESPIRATION RATE: 20 BRPM

## 2020-04-17 DIAGNOSIS — Z98.89 OTHER SPECIFIED POSTPROCEDURAL STATES: Chronic | ICD-10-CM

## 2020-04-17 PROCEDURE — 93010 ELECTROCARDIOGRAM REPORT: CPT

## 2020-04-17 PROCEDURE — 99285 EMERGENCY DEPT VISIT HI MDM: CPT

## 2020-04-17 NOTE — ED PROVIDER NOTE - ATTENDING CONTRIBUTION TO CARE
attending Mark: 50yM works as EMT h/o obesity, diverticulosis, bowel resection for diverticulitis, prior lap band surgery, occasional PVCs p/w 2 hours lightheadedness and mild nausea. Denies exertional component. No syncope, melena/BRBPR, vomiting, shortness of breath. No prior echo or stress testing. Occasionally vapes Juul. +family h/o CAD in his father. Hypertensive on arrival, NAD. Will obtain ekg, place on tele, labs including trop, cxr, reassess.

## 2020-04-17 NOTE — ED PROVIDER NOTE - PHYSICAL EXAMINATION
*Gen: NAD, AAO*3  *HEENT: NC/AT, MMM, airway patent, trachea midline  *CV: RRR, S1/S2 present, no murmurs/rubs  *Resp: no respiratory distress, LCTAB, no wheezing/rales  *Abd: non-distended, soft N/Tx4, no guarding or rigidity  *Neuro: no focal neuro deficits, moving all limbs appropriately  *Extremities: no gross deformity  *Skin: no rashes, no wounds   ~ Andres Rosenberg M.D.

## 2020-04-17 NOTE — ED PROVIDER NOTE - OBJECTIVE STATEMENT
50 year-old male with history of diverticulosis, bowel resection for diverticulitis, lap band surgery 5 years ago (@ [place]), occasional PVCs presents to the Emergency Department for lightheadedness.  Patient reports he was sitting down at work 2 hours ago when he began to feel lightheaded and nauseous.  No chest pain, shortness of breath, LOC.  Patient works as an EMS provider.  No fevers, chills, abdominal pain, diarrhea, BIS.  Has followed up with Cardiology in the past had an EKG done; did not follow-up for ECHO.

## 2020-04-17 NOTE — ED PROVIDER NOTE - NSFOLLOWUPINSTRUCTIONS_ED_ALL_ED_FT
Follow-up with your Primary Care Physician within 3 - 5 days for hypertension management.  Please return to the Emergency Department immediately for any new, worsening or concerning symptoms.    Copy of your lab work, imaging and/or other testing performed in the ER is attached along with your discharge paperwork.  Please take this to your Primary Care Physician for further discussion, evaluation and comparison with your prior blood work results.    A COVID swab was performed in the Emergency Department; test results may take 24 - 48 hours.

## 2020-04-17 NOTE — ED PROVIDER NOTE - PROGRESS NOTE DETAILS
Chet ELI: Serial troponin negative; patient asymptomatic and feels well.  EKG unremarkable.  Patient clear for d/c home and outpatient management.  Cardiology information provided.

## 2020-04-17 NOTE — ED PROVIDER NOTE - NSFOLLOWUPCLINICS_GEN_ALL_ED_FT
[facility]  Cardiology  [address] [place] [address]  Phone: [phone]  Fax:   Follow Up Time: 1-3 Days

## 2020-04-17 NOTE — ED PROVIDER NOTE - CLINICAL SUMMARY MEDICAL DECISION MAKING FREE TEXT BOX
50 year-old male with history of diverticulosis, bowel resection for diverticulitis, lap band surgery 5 years ago (@ [place]), occasional PVCs presents to the Emergency Department for lightheadedness.  No CP; concern for atypical ACS - plan for delta troponin to eval.  EKG to eval for arrhythmia and keep patient on tele monitor during stay.  Lab work, reassess for possible d/c.  Unlikely PE given no tachycardia or hypoxia; unlikely dissection, given clinical picture.

## 2020-04-17 NOTE — ED PROVIDER NOTE - PATIENT PORTAL LINK FT
You can access the FollowMyHealth Patient Portal offered by [facility] by registering at the following website: [email or web address]. By joining [employer]’s FollowMyHealth portal, you will also be able to view your health information using other applications (apps) compatible with our system.

## 2020-04-18 VITALS
DIASTOLIC BLOOD PRESSURE: 100 MMHG | TEMPERATURE: 98 F | OXYGEN SATURATION: 98 % | HEART RATE: 85 BPM | RESPIRATION RATE: 18 BRPM | SYSTOLIC BLOOD PRESSURE: 152 MMHG

## 2020-04-18 LAB
ALBUMIN SERPL ELPH-MCNC: 4.4 G/DL — SIGNIFICANT CHANGE UP (ref 3.3–5)
ALP SERPL-CCNC: 71 U/L — SIGNIFICANT CHANGE UP (ref 40–120)
ALT FLD-CCNC: 47 U/L — HIGH (ref 10–45)
ANION GAP SERPL CALC-SCNC: 13 MMOL/L — SIGNIFICANT CHANGE UP (ref 5–17)
AST SERPL-CCNC: 30 U/L — SIGNIFICANT CHANGE UP (ref 10–40)
BASOPHILS # BLD AUTO: 0.05 K/UL — SIGNIFICANT CHANGE UP (ref 0–0.2)
BASOPHILS NFR BLD AUTO: 0.5 % — SIGNIFICANT CHANGE UP (ref 0–2)
BILIRUB SERPL-MCNC: 0.4 MG/DL — SIGNIFICANT CHANGE UP (ref 0.2–1.2)
BUN SERPL-MCNC: 13 MG/DL — SIGNIFICANT CHANGE UP (ref 7–23)
CALCIUM SERPL-MCNC: 9.1 MG/DL — SIGNIFICANT CHANGE UP (ref 8.4–10.5)
CHLORIDE SERPL-SCNC: 103 MMOL/L — SIGNIFICANT CHANGE UP (ref 96–108)
CO2 SERPL-SCNC: 22 MMOL/L — SIGNIFICANT CHANGE UP (ref 22–31)
CREAT SERPL-MCNC: 0.73 MG/DL — SIGNIFICANT CHANGE UP (ref 0.5–1.3)
EOSINOPHIL # BLD AUTO: 0.11 K/UL — SIGNIFICANT CHANGE UP (ref 0–0.5)
EOSINOPHIL NFR BLD AUTO: 1 % — SIGNIFICANT CHANGE UP (ref 0–6)
GLUCOSE SERPL-MCNC: 106 MG/DL — HIGH (ref 70–99)
HCT VFR BLD CALC: 44.1 % — SIGNIFICANT CHANGE UP (ref 39–50)
HGB BLD-MCNC: 15.2 G/DL — SIGNIFICANT CHANGE UP (ref 13–17)
IMM GRANULOCYTES NFR BLD AUTO: 0.5 % — SIGNIFICANT CHANGE UP (ref 0–1.5)
LYMPHOCYTES # BLD AUTO: 1.53 K/UL — SIGNIFICANT CHANGE UP (ref 1–3.3)
LYMPHOCYTES # BLD AUTO: 14.4 % — SIGNIFICANT CHANGE UP (ref 13–44)
MCHC RBC-ENTMCNC: 29.9 PG — SIGNIFICANT CHANGE UP (ref 27–34)
MCHC RBC-ENTMCNC: 34.5 GM/DL — SIGNIFICANT CHANGE UP (ref 32–36)
MCV RBC AUTO: 86.8 FL — SIGNIFICANT CHANGE UP (ref 80–100)
MONOCYTES # BLD AUTO: 0.87 K/UL — SIGNIFICANT CHANGE UP (ref 0–0.9)
MONOCYTES NFR BLD AUTO: 8.2 % — SIGNIFICANT CHANGE UP (ref 2–14)
NEUTROPHILS # BLD AUTO: 8.05 K/UL — HIGH (ref 1.8–7.4)
NEUTROPHILS NFR BLD AUTO: 75.4 % — SIGNIFICANT CHANGE UP (ref 43–77)
NRBC # BLD: 0 /100 WBCS — SIGNIFICANT CHANGE UP (ref 0–0)
PLATELET # BLD AUTO: 336 K/UL — SIGNIFICANT CHANGE UP (ref 150–400)
POTASSIUM SERPL-MCNC: 4.2 MMOL/L — SIGNIFICANT CHANGE UP (ref 3.5–5.3)
POTASSIUM SERPL-SCNC: 4.2 MMOL/L — SIGNIFICANT CHANGE UP (ref 3.5–5.3)
PROT SERPL-MCNC: 7.4 G/DL — SIGNIFICANT CHANGE UP (ref 6–8.3)
RBC # BLD: 5.08 M/UL — SIGNIFICANT CHANGE UP (ref 4.2–5.8)
RBC # FLD: 11.9 % — SIGNIFICANT CHANGE UP (ref 10.3–14.5)
SARS-COV-2 RNA SPEC QL NAA+PROBE: SIGNIFICANT CHANGE UP
SODIUM SERPL-SCNC: 138 MMOL/L — SIGNIFICANT CHANGE UP (ref 135–145)
TROPONIN T, HIGH SENSITIVITY RESULT: <6 NG/L — SIGNIFICANT CHANGE UP (ref 0–51)
TROPONIN T, HIGH SENSITIVITY RESULT: <6 NG/L — SIGNIFICANT CHANGE UP (ref 0–51)
WBC # BLD: 10.66 K/UL — HIGH (ref 3.8–10.5)
WBC # FLD AUTO: 10.66 K/UL — HIGH (ref 3.8–10.5)

## 2020-04-18 PROCEDURE — 71045 X-RAY EXAM CHEST 1 VIEW: CPT

## 2020-04-18 PROCEDURE — 84484 ASSAY OF TROPONIN QUANT: CPT

## 2020-04-18 PROCEDURE — 93005 ELECTROCARDIOGRAM TRACING: CPT

## 2020-04-18 PROCEDURE — 71045 X-RAY EXAM CHEST 1 VIEW: CPT | Mod: 26

## 2020-04-18 PROCEDURE — 85027 COMPLETE CBC AUTOMATED: CPT

## 2020-04-18 PROCEDURE — 87635 SARS-COV-2 COVID-19 AMP PRB: CPT

## 2020-04-18 PROCEDURE — 99284 EMERGENCY DEPT VISIT MOD MDM: CPT | Mod: 25

## 2020-04-18 PROCEDURE — 80053 COMPREHEN METABOLIC PANEL: CPT

## 2020-04-18 NOTE — ED ADULT NURSE NOTE - OBJECTIVE STATEMENT
51 y/o male presented  to the ED via EMS. pt was working tonight  and had x1 episode of diaphoretic and feeling "out of it". pt states he just doesn't feel right. denies any chest pain, SOB, V/D. did have a episode of nausea has resolved since being in the ED. VSS. EKG performed and cardiac monitor in place. denies any LOC, fever, chills. has been in contact to COVID + due to job occupation but denies cough or SOB. pt has cardiologist but has not followed up in care for Echo. pt is A&Ox4, able to ambulate without difficulty. awaiting  md dispo

## 2020-04-20 DIAGNOSIS — R42 DIZZINESS AND GIDDINESS: ICD-10-CM

## 2020-04-21 ENCOUNTER — NON-APPOINTMENT (OUTPATIENT)
Age: 50
End: 2020-04-21

## 2020-04-21 ENCOUNTER — APPOINTMENT (OUTPATIENT)
Dept: CARDIOLOGY | Facility: CLINIC | Age: 50
End: 2020-04-21
Payer: COMMERCIAL

## 2020-04-21 VITALS
OXYGEN SATURATION: 95 % | HEIGHT: 68 IN | SYSTOLIC BLOOD PRESSURE: 141 MMHG | DIASTOLIC BLOOD PRESSURE: 92 MMHG | WEIGHT: 291 LBS | HEART RATE: 77 BPM | BODY MASS INDEX: 44.1 KG/M2

## 2020-04-21 PROCEDURE — 99203 OFFICE O/P NEW LOW 30 MIN: CPT

## 2020-04-21 PROCEDURE — 93000 ELECTROCARDIOGRAM COMPLETE: CPT

## 2020-04-21 NOTE — HISTORY OF PRESENT ILLNESS
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 He went to ER.  He had been put on amlodipine in the past and PCP stopped it after two weeks. Sent home from ER on nothing.

## 2020-04-21 NOTE — PHYSICAL EXAM
[General Appearance - Well Developed] : well developed [Normal Appearance] : normal appearance [Well Groomed] : well groomed [No Deformities] : no deformities [General Appearance - Well Nourished] : well nourished [General Appearance - In No Acute Distress] : no acute distress [Normal Conjunctiva] : the conjunctiva exhibited no abnormalities [Eyelids - No Xanthelasma] : the eyelids demonstrated no xanthelasmas [Normal Oral Mucosa] : normal oral mucosa [No Oral Pallor] : no oral pallor [No Oral Cyanosis] : no oral cyanosis [Normal Jugular Venous A Waves Present] : normal jugular venous A waves present [Normal Jugular Venous V Waves Present] : normal jugular venous V waves present [No Jugular Venous Mcmillan A Waves] : no jugular venous mcmillan A waves [Heart Rate And Rhythm] : heart rate and rhythm were normal [Heart Sounds] : normal S1 and S2 [Murmurs] : no murmurs present [Respiration, Rhythm And Depth] : normal respiratory rhythm and effort [Exaggerated Use Of Accessory Muscles For Inspiration] : no accessory muscle use [Auscultation Breath Sounds / Voice Sounds] : lungs were clear to auscultation bilaterally [Abdomen Soft] : soft [Abdomen Tenderness] : non-tender [Abdomen Mass (___ Cm)] : no abdominal mass palpated [Abnormal Walk] : normal gait [Gait - Sufficient For Exercise Testing] : the gait was sufficient for exercise testing [Cyanosis, Localized] : no localized cyanosis [Nail Clubbing] : no clubbing of the fingernails [Petechial Hemorrhages (___cm)] : no petechial hemorrhages [Skin Color & Pigmentation] : normal skin color and pigmentation [] : no rash [No Venous Stasis] : no venous stasis [Skin Lesions] : no skin lesions [No Skin Ulcers] : no skin ulcer [No Xanthoma] : no  xanthoma was observed [Oriented To Time, Place, And Person] : oriented to person, place, and time [Mood] : the mood was normal [Affect] : the affect was normal [No Anxiety] : not feeling anxious

## 2020-04-21 NOTE — DISCUSSION/SUMMARY
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 \par #1 CV - dyspnea on exertion, echo ordered, stress test when BP stable\par #2 Htn- unable to tolerate amlodipine in the past, start losartan 25mg for three days then 50mg daily\par #3 General- We discussed adherence to a Mediterranean diet, weight loss and at least 30 minutes of daily exercise.\par

## 2020-04-26 ENCOUNTER — MESSAGE (OUTPATIENT)
Age: 50
End: 2020-04-26

## 2020-04-28 ENCOUNTER — APPOINTMENT (OUTPATIENT)
Dept: INTERNAL MEDICINE | Facility: CLINIC | Age: 50
End: 2020-04-28
Payer: COMMERCIAL

## 2020-04-28 PROCEDURE — 99214 OFFICE O/P EST MOD 30 MIN: CPT | Mod: 95

## 2020-05-04 ENCOUNTER — APPOINTMENT (OUTPATIENT)
Dept: INTERNAL MEDICINE | Facility: CLINIC | Age: 50
End: 2020-05-04
Payer: COMMERCIAL

## 2020-05-04 PROCEDURE — 99214 OFFICE O/P EST MOD 30 MIN: CPT | Mod: 95

## 2020-05-11 ENCOUNTER — OUTPATIENT (OUTPATIENT)
Dept: OUTPATIENT SERVICES | Facility: HOSPITAL | Age: 50
LOS: 1 days | End: 2020-05-11
Payer: COMMERCIAL

## 2020-05-11 ENCOUNTER — APPOINTMENT (OUTPATIENT)
Dept: CV DIAGNOSITCS | Facility: HOSPITAL | Age: 50
End: 2020-05-11

## 2020-05-11 DIAGNOSIS — Z98.89 OTHER SPECIFIED POSTPROCEDURAL STATES: Chronic | ICD-10-CM

## 2020-05-11 DIAGNOSIS — I25.10 ATHEROSCLEROTIC HEART DISEASE OF NATIVE CORONARY ARTERY WITHOUT ANGINA PECTORIS: ICD-10-CM

## 2020-05-11 PROCEDURE — 93306 TTE W/DOPPLER COMPLETE: CPT

## 2020-05-11 PROCEDURE — 93306 TTE W/DOPPLER COMPLETE: CPT | Mod: 26

## 2020-05-19 ENCOUNTER — APPOINTMENT (OUTPATIENT)
Dept: CARDIOLOGY | Facility: CLINIC | Age: 50
End: 2020-05-19

## 2020-08-31 ENCOUNTER — APPOINTMENT (OUTPATIENT)
Dept: INTERNAL MEDICINE | Facility: CLINIC | Age: 50
End: 2020-08-31

## 2020-09-14 ENCOUNTER — APPOINTMENT (OUTPATIENT)
Dept: INTERNAL MEDICINE | Facility: CLINIC | Age: 50
End: 2020-09-14
Payer: COMMERCIAL

## 2020-09-14 ENCOUNTER — NON-APPOINTMENT (OUTPATIENT)
Age: 50
End: 2020-09-14

## 2020-09-14 VITALS
WEIGHT: 288 LBS | RESPIRATION RATE: 17 BRPM | DIASTOLIC BLOOD PRESSURE: 89 MMHG | SYSTOLIC BLOOD PRESSURE: 136 MMHG | BODY MASS INDEX: 43.65 KG/M2 | OXYGEN SATURATION: 98 % | HEART RATE: 75 BPM | TEMPERATURE: 97.9 F | HEIGHT: 68 IN

## 2020-09-14 DIAGNOSIS — Z23 ENCOUNTER FOR IMMUNIZATION: ICD-10-CM

## 2020-09-14 PROCEDURE — 99396 PREV VISIT EST AGE 40-64: CPT | Mod: 25

## 2020-09-14 PROCEDURE — 93000 ELECTROCARDIOGRAM COMPLETE: CPT

## 2020-09-14 PROCEDURE — 90471 IMMUNIZATION ADMIN: CPT

## 2020-09-14 PROCEDURE — 90686 IIV4 VACC NO PRSV 0.5 ML IM: CPT

## 2020-09-15 ENCOUNTER — TRANSCRIPTION ENCOUNTER (OUTPATIENT)
Age: 50
End: 2020-09-15

## 2020-09-15 LAB
ALBUMIN SERPL ELPH-MCNC: 4.6 G/DL
ALP BLD-CCNC: 64 U/L
ALT SERPL-CCNC: 54 U/L
ANION GAP SERPL CALC-SCNC: 15 MMOL/L
APPEARANCE: CLEAR
AST SERPL-CCNC: 26 U/L
BASOPHILS # BLD AUTO: 0.06 K/UL
BASOPHILS NFR BLD AUTO: 0.7 %
BILIRUB SERPL-MCNC: 0.6 MG/DL
BILIRUBIN URINE: NEGATIVE
BLOOD URINE: NEGATIVE
BUN SERPL-MCNC: 14 MG/DL
CALCIUM SERPL-MCNC: 9.6 MG/DL
CHLORIDE SERPL-SCNC: 102 MMOL/L
CHOLEST SERPL-MCNC: 167 MG/DL
CHOLEST/HDLC SERPL: 4.2 RATIO
CO2 SERPL-SCNC: 23 MMOL/L
COLOR: YELLOW
CREAT SERPL-MCNC: 0.74 MG/DL
EOSINOPHIL # BLD AUTO: 0.2 K/UL
EOSINOPHIL NFR BLD AUTO: 2.4 %
ESTIMATED AVERAGE GLUCOSE: 117 MG/DL
GLUCOSE QUALITATIVE U: NEGATIVE
GLUCOSE SERPL-MCNC: 96 MG/DL
HBA1C MFR BLD HPLC: 5.7 %
HCT VFR BLD CALC: 44.3 %
HDLC SERPL-MCNC: 39 MG/DL
HGB BLD-MCNC: 14.6 G/DL
IMM GRANULOCYTES NFR BLD AUTO: 0.2 %
KETONES URINE: NEGATIVE
LDLC SERPL CALC-MCNC: 93 MG/DL
LEUKOCYTE ESTERASE URINE: NEGATIVE
LYMPHOCYTES # BLD AUTO: 2.26 K/UL
LYMPHOCYTES NFR BLD AUTO: 26.9 %
MAN DIFF?: NORMAL
MCHC RBC-ENTMCNC: 30 PG
MCHC RBC-ENTMCNC: 33 GM/DL
MCV RBC AUTO: 91.2 FL
MONOCYTES # BLD AUTO: 0.78 K/UL
MONOCYTES NFR BLD AUTO: 9.3 %
NEUTROPHILS # BLD AUTO: 5.08 K/UL
NEUTROPHILS NFR BLD AUTO: 60.5 %
NITRITE URINE: NEGATIVE
PH URINE: 6
PLATELET # BLD AUTO: 305 K/UL
POTASSIUM SERPL-SCNC: 4.4 MMOL/L
PROT SERPL-MCNC: 7 G/DL
PROTEIN URINE: NORMAL
PSA FREE FLD-MCNC: 25 %
PSA FREE SERPL-MCNC: 0.25 NG/ML
PSA SERPL-MCNC: 1.02 NG/ML
RBC # BLD: 4.86 M/UL
RBC # FLD: 12.4 %
SODIUM SERPL-SCNC: 140 MMOL/L
SPECIFIC GRAVITY URINE: 1.03
TRIGL SERPL-MCNC: 169 MG/DL
TSH SERPL-ACNC: 2.09 UIU/ML
UROBILINOGEN URINE: NORMAL
WBC # FLD AUTO: 8.4 K/UL

## 2020-09-18 NOTE — ED ADULT NURSE NOTE - NS ED NURSE DC INFO COMPLEXITY
Detail Level: Zone
Simple: Patient demonstrates quick and easy understanding/Verbalized Understanding

## 2020-10-05 ENCOUNTER — APPOINTMENT (OUTPATIENT)
Dept: UROLOGY | Facility: CLINIC | Age: 50
End: 2020-10-05
Payer: COMMERCIAL

## 2020-10-05 VITALS
OXYGEN SATURATION: 97 % | WEIGHT: 288 LBS | TEMPERATURE: 98.4 F | HEIGHT: 68 IN | HEART RATE: 88 BPM | RESPIRATION RATE: 18 BRPM | BODY MASS INDEX: 43.65 KG/M2

## 2020-10-05 DIAGNOSIS — R35.0 FREQUENCY OF MICTURITION: ICD-10-CM

## 2020-10-05 PROCEDURE — 51798 US URINE CAPACITY MEASURE: CPT

## 2020-10-05 PROCEDURE — 99203 OFFICE O/P NEW LOW 30 MIN: CPT | Mod: 25

## 2020-10-05 NOTE — HISTORY OF PRESENT ILLNESS
[FreeTextEntry1] : patient found to have low testosterone last year  of 230 however he states he has been on 'Testipel' for many years before with different  but stopped as his blood was getting 'thick' and he was to have to done blood to help dec Hct.\par he also had bowel surgery for elevated BMI but unfortunatley due to a change in job by his report which causes him to be seated for 12 hours at a time, he has gained a signif amount back and his BMI is now at 41. \par he does report some LUTS while at work of frequency /uregncy but relates it to increased coffee and water intake as he states it is normal when he is at home.\par the reason for is T replacement is due to low energy and dec libido--no issues with erections\par reviewing labs: besides eleva BMI , he is prediabeitic , elevated triglycerides  and normal psa of 1.02 with no fam hs of pca

## 2020-10-05 NOTE — PHYSICAL EXAM
[General Appearance - Well Developed] : well developed [General Appearance - Well Nourished] : well nourished [Normal Appearance] : normal appearance [Well Groomed] : well groomed [General Appearance - In No Acute Distress] : no acute distress [Edema] : no peripheral edema [Respiration, Rhythm And Depth] : normal respiratory rhythm and effort [Exaggerated Use Of Accessory Muscles For Inspiration] : no accessory muscle use [Abdomen Soft] : soft [Abdomen Tenderness] : non-tender [Abdomen Mass (___ Cm)] : no abdominal mass palpated [Abdomen Hernia] : no hernia was discovered [Costovertebral Angle Tenderness] : no ~M costovertebral angle tenderness [FreeTextEntry1] : obese- pvr 22ml  [Rectal Exam - Rectum] : digital rectal exam was normal [Prostate Enlargement] : the prostate was not enlarged [Prostate Tenderness] : the prostate was not tender [No Prostate Nodules] : no prostate nodules [Prostate Size ___ gm] : prostate size [unfilled] gm [Normal Station and Gait] : the gait and station were normal for the patient's age [] : no rash [No Focal Deficits] : no focal deficits [Oriented To Time, Place, And Person] : oriented to person, place, and time [Affect] : the affect was normal [Mood] : the mood was normal [Not Anxious] : not anxious [Cervical Lymph Nodes Enlarged Posterior Bilaterally] : posterior cervical [Cervical Lymph Nodes Enlarged Anterior Bilaterally] : anterior cervical [Supraclavicular Lymph Nodes Enlarged Bilaterally] : supraclavicular

## 2020-10-05 NOTE — ASSESSMENT
[FreeTextEntry1] : patient withh hx of LOW t on testipel\par stopped due to rising HCT \par  now here for alt treatment\par last T done last year\par repeated today\par other reasons for low T discussed: BMI, Trigly, preDM\par discussed IM injections - weekly or daily cream\par advantage of Testipel is q 3m depot\par after T results received --will refer to colleague - specialist in men's health : Dr Wilson ( referral given )

## 2020-10-07 LAB — TESTOST SERPL-MCNC: 278 NG/DL

## 2020-10-16 ENCOUNTER — NON-APPOINTMENT (OUTPATIENT)
Age: 50
End: 2020-10-16

## 2020-10-16 ENCOUNTER — APPOINTMENT (OUTPATIENT)
Dept: CARDIOLOGY | Facility: CLINIC | Age: 50
End: 2020-10-16
Payer: COMMERCIAL

## 2020-10-16 VITALS
SYSTOLIC BLOOD PRESSURE: 128 MMHG | DIASTOLIC BLOOD PRESSURE: 87 MMHG | TEMPERATURE: 98.4 F | OXYGEN SATURATION: 98 % | HEART RATE: 85 BPM | RESPIRATION RATE: 17 BRPM | WEIGHT: 288 LBS | BODY MASS INDEX: 43.65 KG/M2 | HEIGHT: 68 IN

## 2020-10-16 DIAGNOSIS — R06.00 DYSPNEA, UNSPECIFIED: ICD-10-CM

## 2020-10-16 PROCEDURE — 99203 OFFICE O/P NEW LOW 30 MIN: CPT

## 2020-10-16 PROCEDURE — 93000 ELECTROCARDIOGRAM COMPLETE: CPT

## 2020-10-16 RX ORDER — LOSARTAN POTASSIUM 50 MG/1
50 TABLET, FILM COATED ORAL DAILY
Qty: 90 | Refills: 3 | Status: DISCONTINUED | COMMUNITY
Start: 2020-04-21 | End: 2020-10-16

## 2020-10-16 RX ORDER — LOSARTAN POTASSIUM AND HYDROCHLOROTHIAZIDE 25; 100 MG/1; MG/1
100-25 TABLET ORAL DAILY
Qty: 30 | Refills: 3 | Status: DISCONTINUED | COMMUNITY
Start: 2020-10-16 | End: 2020-10-16

## 2020-10-16 NOTE — DISCUSSION/SUMMARY
[Not Responding to Treatment] : not responding to treatment [Outpatient Evaluation] : outpatient evaluation [Medication Changes Per Orders] : as documented in orders [Ambulatory BP Monitoring] : ambulatory blood pressure monitoring [Exercise Regimen] : an exercise regimen [Weight Loss] : weight loss [Sodium Restriction] : sodium restriction [Holter Monitor] : a Holter monitor [Multidetector Cardiac CT] : a cardiac multidetector CT [de-identified] : cardiac cta [de-identified] : inc losarten to 50bid, cont norvasc 5, can take bid prn

## 2020-10-16 NOTE — PHYSICAL EXAM
[General Appearance - Well Developed] : well developed [Normal Appearance] : normal appearance [Well Groomed] : well groomed [General Appearance - In No Acute Distress] : no acute distress [General Appearance - Well Nourished] : well nourished [No Deformities] : no deformities [Eyelids - No Xanthelasma] : the eyelids demonstrated no xanthelasmas [Normal Conjunctiva] : the conjunctiva exhibited no abnormalities [No Oral Pallor] : no oral pallor [No Oral Cyanosis] : no oral cyanosis [Normal Oral Mucosa] : normal oral mucosa [Normal Jugular Venous A Waves Present] : normal jugular venous A waves present [Normal Jugular Venous V Waves Present] : normal jugular venous V waves present [No Jugular Venous Mcmillan A Waves] : no jugular venous mcmillan A waves [Normal Rate] : normal [Normal S1] : normal S1 [Normal S2] : normal S2 [S3] : no S3 [No Murmur] : no murmurs heard [S4] : no S4 [Right Carotid Bruit] : no bruit heard over the right carotid [Left Carotid Bruit] : no bruit heard over the left carotid [Left Femoral Bruit] : no bruit heard over the left femoral artery [Right Femoral Bruit] : no bruit heard over the right femoral artery [2+] : left 2+ [No Abnormalities] : the abdominal aorta was not enlarged and no bruit was heard [No Pitting Edema] : no pitting edema present [Respiration, Rhythm And Depth] : normal respiratory rhythm and effort [Abdomen Soft] : soft [Exaggerated Use Of Accessory Muscles For Inspiration] : no accessory muscle use [Auscultation Breath Sounds / Voice Sounds] : lungs were clear to auscultation bilaterally [Abdomen Tenderness] : non-tender [Abdomen Mass (___ Cm)] : no abdominal mass palpated [Abnormal Walk] : normal gait [Gait - Sufficient For Exercise Testing] : the gait was sufficient for exercise testing [Nail Clubbing] : no clubbing of the fingernails [Petechial Hemorrhages (___cm)] : no petechial hemorrhages [Cyanosis, Localized] : no localized cyanosis [Skin Color & Pigmentation] : normal skin color and pigmentation [] : no rash [No Venous Stasis] : no venous stasis [Skin Lesions] : no skin lesions [No Skin Ulcers] : no skin ulcer [No Xanthoma] : no  xanthoma was observed [Oriented To Time, Place, And Person] : oriented to person, place, and time [Affect] : the affect was normal [Mood] : the mood was normal [No Anxiety] : not feeling anxious

## 2020-10-16 NOTE — REASON FOR VISIT
[Consultation] : a consultation regarding [FreeTextEntry2] : pt with poor bp control, gets dizzy when ambrosio up later in day, occasional dyspnea on exertion, no sscp. pt getes palps at rest, hi stress job

## 2020-11-03 ENCOUNTER — APPOINTMENT (OUTPATIENT)
Dept: CARDIOLOGY | Facility: CLINIC | Age: 50
End: 2020-11-03

## 2020-11-10 ENCOUNTER — APPOINTMENT (OUTPATIENT)
Dept: RADIOLOGY | Facility: CLINIC | Age: 50
End: 2020-11-10
Payer: COMMERCIAL

## 2020-11-10 ENCOUNTER — OUTPATIENT (OUTPATIENT)
Dept: OUTPATIENT SERVICES | Facility: HOSPITAL | Age: 50
LOS: 1 days | End: 2020-11-10
Payer: COMMERCIAL

## 2020-11-10 DIAGNOSIS — Z00.8 ENCOUNTER FOR OTHER GENERAL EXAMINATION: ICD-10-CM

## 2020-11-10 DIAGNOSIS — Z98.89 OTHER SPECIFIED POSTPROCEDURAL STATES: Chronic | ICD-10-CM

## 2020-11-10 PROCEDURE — 72040 X-RAY EXAM NECK SPINE 2-3 VW: CPT | Mod: 26

## 2020-11-10 PROCEDURE — 72040 X-RAY EXAM NECK SPINE 2-3 VW: CPT

## 2020-11-10 PROCEDURE — 72100 X-RAY EXAM L-S SPINE 2/3 VWS: CPT | Mod: 26

## 2020-11-10 PROCEDURE — 72100 X-RAY EXAM L-S SPINE 2/3 VWS: CPT

## 2020-11-19 NOTE — DISCHARGE NOTE PROVIDER - REASON FOR ADMISSION
Employee informed of negative Rapid result. Employee remains to have symptoms and  is ordered for Saturday. Employee was instructed to take  test Saturday 11/19/20. Manager was also informed via email. Gi bleed

## 2020-12-07 PROCEDURE — 0298T: CPT

## 2020-12-07 PROCEDURE — 99072 ADDL SUPL MATRL&STAF TM PHE: CPT

## 2020-12-14 LAB
ALBUMIN SERPL ELPH-MCNC: 4.6 G/DL
ANION GAP SERPL CALC-SCNC: 13 MMOL/L
BUN SERPL-MCNC: 14 MG/DL
CALCIUM SERPL-MCNC: 9.5 MG/DL
CHLORIDE SERPL-SCNC: 101 MMOL/L
CO2 SERPL-SCNC: 22 MMOL/L
CREAT SERPL-MCNC: 0.8 MG/DL
GLUCOSE SERPL-MCNC: 118 MG/DL
PHOSPHATE SERPL-MCNC: 3.6 MG/DL
POTASSIUM SERPL-SCNC: 4.3 MMOL/L
SODIUM SERPL-SCNC: 136 MMOL/L

## 2020-12-15 ENCOUNTER — RESULT REVIEW (OUTPATIENT)
Age: 50
End: 2020-12-15

## 2020-12-15 ENCOUNTER — APPOINTMENT (OUTPATIENT)
Dept: ELECTROPHYSIOLOGY | Facility: CLINIC | Age: 50
End: 2020-12-15
Payer: COMMERCIAL

## 2020-12-15 ENCOUNTER — APPOINTMENT (OUTPATIENT)
Dept: CARDIOLOGY | Facility: CLINIC | Age: 50
End: 2020-12-15

## 2020-12-15 ENCOUNTER — NON-APPOINTMENT (OUTPATIENT)
Age: 50
End: 2020-12-15

## 2020-12-15 ENCOUNTER — OUTPATIENT (OUTPATIENT)
Dept: OUTPATIENT SERVICES | Facility: HOSPITAL | Age: 50
LOS: 1 days | End: 2020-12-15
Payer: COMMERCIAL

## 2020-12-15 VITALS — SYSTOLIC BLOOD PRESSURE: 137 MMHG | OXYGEN SATURATION: 97 % | HEART RATE: 75 BPM | DIASTOLIC BLOOD PRESSURE: 87 MMHG

## 2020-12-15 DIAGNOSIS — R06.00 DYSPNEA, UNSPECIFIED: ICD-10-CM

## 2020-12-15 DIAGNOSIS — Z98.89 OTHER SPECIFIED POSTPROCEDURAL STATES: Chronic | ICD-10-CM

## 2020-12-15 DIAGNOSIS — Z00.00 ENCOUNTER FOR GENERAL ADULT MEDICAL EXAMINATION WITHOUT ABNORMAL FINDINGS: ICD-10-CM

## 2020-12-15 PROCEDURE — 99072 ADDL SUPL MATRL&STAF TM PHE: CPT

## 2020-12-15 PROCEDURE — 75574 CT ANGIO HRT W/3D IMAGE: CPT

## 2020-12-15 PROCEDURE — 93000 ELECTROCARDIOGRAM COMPLETE: CPT

## 2020-12-15 PROCEDURE — 75574 CT ANGIO HRT W/3D IMAGE: CPT | Mod: 26

## 2020-12-15 PROCEDURE — 99203 OFFICE O/P NEW LOW 30 MIN: CPT

## 2020-12-15 NOTE — HISTORY OF PRESENT ILLNESS
[FreeTextEntry1] : Mr. Ghulam Marx was seen in the [facility] today. For our records, please allow me to summarize the history and my findings.\par \par This pleasant 50 year old man has a cardiovascular history significant for hypertension and palpitations. He describes the sensation as a sustained racing heart beat for several minutes at a time. He works overnight shifts and notes his symptoms occur invariably when he gets home and is lying quietly in bed trying to go to sleep in the morning. He drinks at least 3 cups of coffee during an overnight shift, sometimes only an hour before going to bed. He has gained 20-30 pounds over the past 1-2 years.\par \par He was given an 8 day event monitor, for which he triggered the device multiple times for sinus rhythm/ low level sinus tach with isolated PVCs. The total PVC burden was < 1%. One asymptomatic, 3.1s sinus pause occurred, during which there was clear sinus slowing both preceding and following the pause. TTE (5/20) showed a structurally normal heart.\par \par Mr. Causey denies any recent history of chest pain, shortness of breath, or syncope.\par

## 2020-12-15 NOTE — DISCUSSION/SUMMARY
[FreeTextEntry1] : In summary, this is a 50 year old man with episodes of palpitations that correspond on monitoring to periods of sinus rhythm/low-level sinus tach on event monitoring. We discussed possible extrinsic causes for this finding, including excessive caffeine intake, weight gain, and anxiety. He was given a prescription for low-dose beta blocker to be taken an hour before sleep. During monitoring a single 3 second pause that occurred during his sleeping hours. The episode was asymptomatic and based upon sinus node behavior was certain to be vagal in etiology without need for further intervention.\par \par Mr. Marx appeared to understand the whole discussion and verbalized that all of his questions were answered to his satisfaction.\par \par Thank you for allowing me to be involved in the care of this pleasant man. Please feel free to contact me with any questions.\par \par \par \par Esa Verde MD\par  of Cardiology\par Electrophysiology Section\[address], [address] [place] [address]\[place] [address]\par Office: [phone]\par Fax: [phone]\par

## 2020-12-15 NOTE — PHYSICAL EXAM
[General Appearance - Well Developed] : well developed [Normal Appearance] : normal appearance [Well Groomed] : well groomed [General Appearance - Well Nourished] : well nourished [No Deformities] : no deformities [General Appearance - In No Acute Distress] : no acute distress [Normal Conjunctiva] : the conjunctiva exhibited no abnormalities [Eyelids - No Xanthelasma] : the eyelids demonstrated no xanthelasmas [Normal Oral Mucosa] : normal oral mucosa [No Oral Pallor] : no oral pallor [No Oral Cyanosis] : no oral cyanosis [Normal Jugular Venous A Waves Present] : normal jugular venous A waves present [Normal Jugular Venous V Waves Present] : normal jugular venous V waves present [No Jugular Venous Mcmillan A Waves] : no jugular venous mcmillan A waves [Heart Rate And Rhythm] : heart rate and rhythm were normal [Heart Sounds] : normal S1 and S2 [Murmurs] : no murmurs present [Respiration, Rhythm And Depth] : normal respiratory rhythm and effort [Exaggerated Use Of Accessory Muscles For Inspiration] : no accessory muscle use [Auscultation Breath Sounds / Voice Sounds] : lungs were clear to auscultation bilaterally [Abdomen Soft] : soft [Abdomen Tenderness] : non-tender [Abdomen Mass (___ Cm)] : no abdominal mass palpated [Abnormal Walk] : normal gait [Gait - Sufficient For Exercise Testing] : the gait was sufficient for exercise testing [Nail Clubbing] : no clubbing of the fingernails [Cyanosis, Localized] : no localized cyanosis [Petechial Hemorrhages (___cm)] : no petechial hemorrhages [Skin Color & Pigmentation] : normal skin color and pigmentation [] : no rash [No Venous Stasis] : no venous stasis [Skin Lesions] : no skin lesions [No Skin Ulcers] : no skin ulcer [No Xanthoma] : no  xanthoma was observed [Oriented To Time, Place, And Person] : oriented to person, place, and time [Affect] : the affect was normal [Mood] : the mood was normal [No Anxiety] : not feeling anxious

## 2021-03-13 ENCOUNTER — TRANSCRIPTION ENCOUNTER (OUTPATIENT)
Age: 51
End: 2021-03-13

## 2021-04-05 ENCOUNTER — APPOINTMENT (OUTPATIENT)
Dept: INTERNAL MEDICINE | Facility: CLINIC | Age: 51
End: 2021-04-05
Payer: COMMERCIAL

## 2021-04-05 ENCOUNTER — APPOINTMENT (OUTPATIENT)
Dept: CT IMAGING | Facility: CLINIC | Age: 51
End: 2021-04-05
Payer: COMMERCIAL

## 2021-04-05 ENCOUNTER — RESULT REVIEW (OUTPATIENT)
Age: 51
End: 2021-04-05

## 2021-04-05 ENCOUNTER — APPOINTMENT (OUTPATIENT)
Dept: MRI IMAGING | Facility: CLINIC | Age: 51
End: 2021-04-05
Payer: COMMERCIAL

## 2021-04-05 ENCOUNTER — OUTPATIENT (OUTPATIENT)
Dept: OUTPATIENT SERVICES | Facility: HOSPITAL | Age: 51
LOS: 1 days | End: 2021-04-05
Payer: COMMERCIAL

## 2021-04-05 VITALS
HEART RATE: 85 BPM | TEMPERATURE: 98.1 F | SYSTOLIC BLOOD PRESSURE: 133 MMHG | DIASTOLIC BLOOD PRESSURE: 87 MMHG | HEIGHT: 68 IN | OXYGEN SATURATION: 95 % | BODY MASS INDEX: 44.71 KG/M2 | WEIGHT: 295 LBS | RESPIRATION RATE: 17 BRPM

## 2021-04-05 DIAGNOSIS — Z98.89 OTHER SPECIFIED POSTPROCEDURAL STATES: Chronic | ICD-10-CM

## 2021-04-05 DIAGNOSIS — M79.18 MYALGIA, OTHER SITE: ICD-10-CM

## 2021-04-05 PROCEDURE — 99215 OFFICE O/P EST HI 40 MIN: CPT

## 2021-04-05 PROCEDURE — 99072 ADDL SUPL MATRL&STAF TM PHE: CPT

## 2021-04-05 PROCEDURE — 72195 MRI PELVIS W/O DYE: CPT

## 2021-04-05 PROCEDURE — 72195 MRI PELVIS W/O DYE: CPT | Mod: 26

## 2021-04-08 DIAGNOSIS — T14.8XXA OTHER INJURY OF UNSPECIFIED BODY REGION, INITIAL ENCOUNTER: ICD-10-CM

## 2021-04-12 ENCOUNTER — APPOINTMENT (OUTPATIENT)
Dept: SURGERY | Facility: CLINIC | Age: 51
End: 2021-04-12
Payer: COMMERCIAL

## 2021-04-12 VITALS
HEIGHT: 68 IN | TEMPERATURE: 97.9 F | SYSTOLIC BLOOD PRESSURE: 136 MMHG | WEIGHT: 290 LBS | BODY MASS INDEX: 43.95 KG/M2 | DIASTOLIC BLOOD PRESSURE: 91 MMHG | HEART RATE: 89 BPM

## 2021-04-12 PROCEDURE — 99203 OFFICE O/P NEW LOW 30 MIN: CPT | Mod: 25

## 2021-04-12 PROCEDURE — 10160 PNXR ASPIR ABSC HMTMA BULLA: CPT

## 2021-04-12 PROCEDURE — 99072 ADDL SUPL MATRL&STAF TM PHE: CPT

## 2021-04-13 ENCOUNTER — APPOINTMENT (OUTPATIENT)
Dept: COLORECTAL SURGERY | Facility: CLINIC | Age: 51
End: 2021-04-13

## 2021-04-19 ENCOUNTER — APPOINTMENT (OUTPATIENT)
Dept: SURGERY | Facility: CLINIC | Age: 51
End: 2021-04-19
Payer: COMMERCIAL

## 2021-04-19 VITALS
WEIGHT: 290 LBS | HEIGHT: 68 IN | SYSTOLIC BLOOD PRESSURE: 118 MMHG | TEMPERATURE: 97.7 F | BODY MASS INDEX: 43.95 KG/M2 | HEART RATE: 89 BPM | DIASTOLIC BLOOD PRESSURE: 73 MMHG

## 2021-04-19 DIAGNOSIS — M79.18 MYALGIA, OTHER SITE: ICD-10-CM

## 2021-04-19 PROCEDURE — 10021 FNA BX W/O IMG GDN 1ST LES: CPT | Mod: 78

## 2021-04-19 PROCEDURE — 99024 POSTOP FOLLOW-UP VISIT: CPT

## 2021-04-19 NOTE — ASSESSMENT
[FreeTextEntry1] : patient presents 1 week s/p aspiration of fluid over 100cc serous fluid from left gluteal area after a fall. He states he felt significant relief until a few days ago where he states he felt like the fluid reaccumulated. \par \par \par + area of fluid reaccumulation. 70 cc aspirated \par \par \par \par f/u prn.

## 2021-04-26 ENCOUNTER — APPOINTMENT (OUTPATIENT)
Dept: SURGERY | Facility: CLINIC | Age: 51
End: 2021-04-26
Payer: COMMERCIAL

## 2021-04-26 VITALS
SYSTOLIC BLOOD PRESSURE: 140 MMHG | HEIGHT: 68 IN | DIASTOLIC BLOOD PRESSURE: 93 MMHG | TEMPERATURE: 98.1 F | WEIGHT: 290 LBS | BODY MASS INDEX: 43.95 KG/M2 | HEART RATE: 83 BPM

## 2021-04-26 PROCEDURE — 99072 ADDL SUPL MATRL&STAF TM PHE: CPT

## 2021-04-26 PROCEDURE — 99212 OFFICE O/P EST SF 10 MIN: CPT

## 2021-06-27 ENCOUNTER — RX RENEWAL (OUTPATIENT)
Age: 51
End: 2021-06-27

## 2021-09-20 ENCOUNTER — APPOINTMENT (OUTPATIENT)
Dept: INTERNAL MEDICINE | Facility: CLINIC | Age: 51
End: 2021-09-20
Payer: COMMERCIAL

## 2021-09-20 VITALS
DIASTOLIC BLOOD PRESSURE: 81 MMHG | BODY MASS INDEX: 41.22 KG/M2 | RESPIRATION RATE: 17 BRPM | HEIGHT: 68 IN | SYSTOLIC BLOOD PRESSURE: 114 MMHG | OXYGEN SATURATION: 97 % | WEIGHT: 272 LBS | HEART RATE: 78 BPM | TEMPERATURE: 97.9 F

## 2021-09-20 PROCEDURE — 99396 PREV VISIT EST AGE 40-64: CPT

## 2021-09-21 LAB
25(OH)D3 SERPL-MCNC: 23.7 NG/ML
ALBUMIN SERPL ELPH-MCNC: 4.6 G/DL
ALP BLD-CCNC: 73 U/L
ALT SERPL-CCNC: 67 U/L
ANION GAP SERPL CALC-SCNC: 12 MMOL/L
APPEARANCE: CLEAR
AST SERPL-CCNC: 29 U/L
BASOPHILS # BLD AUTO: 0.04 K/UL
BASOPHILS NFR BLD AUTO: 0.5 %
BILIRUB SERPL-MCNC: 0.6 MG/DL
BILIRUBIN URINE: NEGATIVE
BLOOD URINE: NEGATIVE
BUN SERPL-MCNC: 16 MG/DL
CALCIUM SERPL-MCNC: 9.9 MG/DL
CHLORIDE SERPL-SCNC: 104 MMOL/L
CHOLEST SERPL-MCNC: 177 MG/DL
CO2 SERPL-SCNC: 23 MMOL/L
COLOR: YELLOW
CREAT SERPL-MCNC: 0.77 MG/DL
EOSINOPHIL # BLD AUTO: 0.19 K/UL
EOSINOPHIL NFR BLD AUTO: 2.3 %
ESTIMATED AVERAGE GLUCOSE: 117 MG/DL
GLUCOSE QUALITATIVE U: NEGATIVE
GLUCOSE SERPL-MCNC: 111 MG/DL
HBA1C MFR BLD HPLC: 5.7 %
HCT VFR BLD CALC: 44.3 %
HDLC SERPL-MCNC: 40 MG/DL
HGB BLD-MCNC: 15.1 G/DL
IMM GRANULOCYTES NFR BLD AUTO: 0.2 %
KETONES URINE: NEGATIVE
LDLC SERPL CALC-MCNC: 111 MG/DL
LEUKOCYTE ESTERASE URINE: NEGATIVE
LYMPHOCYTES # BLD AUTO: 1.8 K/UL
LYMPHOCYTES NFR BLD AUTO: 21.6 %
MAN DIFF?: NORMAL
MCHC RBC-ENTMCNC: 30.4 PG
MCHC RBC-ENTMCNC: 34.1 GM/DL
MCV RBC AUTO: 89.1 FL
MONOCYTES # BLD AUTO: 0.74 K/UL
MONOCYTES NFR BLD AUTO: 8.9 %
NEUTROPHILS # BLD AUTO: 5.53 K/UL
NEUTROPHILS NFR BLD AUTO: 66.5 %
NITRITE URINE: NEGATIVE
NONHDLC SERPL-MCNC: 136 MG/DL
PH URINE: 6
PLATELET # BLD AUTO: 310 K/UL
POTASSIUM SERPL-SCNC: 4.3 MMOL/L
PROT SERPL-MCNC: 7.4 G/DL
PROTEIN URINE: NEGATIVE
PSA FREE FLD-MCNC: 21 %
PSA FREE SERPL-MCNC: 0.17 NG/ML
PSA SERPL-MCNC: 0.81 NG/ML
RBC # BLD: 4.97 M/UL
RBC # FLD: 12.7 %
SODIUM SERPL-SCNC: 139 MMOL/L
SPECIFIC GRAVITY URINE: 1.02
TRIGL SERPL-MCNC: 126 MG/DL
TSH SERPL-ACNC: 1.51 UIU/ML
UROBILINOGEN URINE: NORMAL
WBC # FLD AUTO: 8.32 K/UL

## 2021-09-27 ENCOUNTER — NON-APPOINTMENT (OUTPATIENT)
Age: 51
End: 2021-09-27

## 2021-09-27 ENCOUNTER — APPOINTMENT (OUTPATIENT)
Dept: CARDIOLOGY | Facility: CLINIC | Age: 51
End: 2021-09-27
Payer: COMMERCIAL

## 2021-09-27 VITALS
OXYGEN SATURATION: 98 % | WEIGHT: 272 LBS | BODY MASS INDEX: 41.22 KG/M2 | DIASTOLIC BLOOD PRESSURE: 88 MMHG | HEART RATE: 103 BPM | SYSTOLIC BLOOD PRESSURE: 120 MMHG | HEIGHT: 68 IN

## 2021-09-27 VITALS — SYSTOLIC BLOOD PRESSURE: 130 MMHG | DIASTOLIC BLOOD PRESSURE: 82 MMHG

## 2021-09-27 DIAGNOSIS — F17.200 NICOTINE DEPENDENCE, UNSPECIFIED, UNCOMPLICATED: ICD-10-CM

## 2021-09-27 DIAGNOSIS — Z78.9 OTHER SPECIFIED HEALTH STATUS: ICD-10-CM

## 2021-09-27 DIAGNOSIS — Z87.891 PERSONAL HISTORY OF NICOTINE DEPENDENCE: ICD-10-CM

## 2021-09-27 PROCEDURE — 93000 ELECTROCARDIOGRAM COMPLETE: CPT

## 2021-09-27 PROCEDURE — 99213 OFFICE O/P EST LOW 20 MIN: CPT

## 2021-09-28 PROBLEM — Z87.891 FORMER SMOKER: Status: ACTIVE | Noted: 2021-09-27

## 2021-09-28 PROBLEM — Z78.9 CAFFEINE USE: Status: ACTIVE | Noted: 2021-09-27

## 2021-09-28 NOTE — ASSESSMENT
[FreeTextEntry1] : Right arm pain is musculoskeletal and non-anginal.\par Discussed with pt lifestyle changes to improve BP control - advised weight loss, low salt diet, regular aerobic exercise, stress management, smoking avoidance, moderation of caffeine intake, and treatment of sleep apnea.\par

## 2021-09-28 NOTE — HISTORY OF PRESENT ILLNESS
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  He went to ER twice, both times ruled out for MI and discharged.  He followed up and was referred for CT coronary angio which was notable for total coronary calcium score 53 with diffuse mild (<30%) non-obstructive plaque. \par Since then he has continued to have reportedly suboptimally controlled BP with episodic spikes up to 200/100 while at work.  He also complains of right arm pain with movement for months.\par Also reports history of palpitations at rest that have been better recently.\par He had holter monitor in past that showed ST, he saw EP and was rx'd beta blocker which he only started recently.  \par He recently started exercising by walking and has lost weight with diet after having gained 25 lbs back following bariatric surgery.\par No dizziness or presyncope. No lower ext edema.\par He has TJ but is not using CPAP because of lack of comfort.\par Recent labs reviewed.

## 2021-09-28 NOTE — DISCUSSION/SUMMARY
[FreeTextEntry1] : Advised increase metoprolol to 50 mg QD to take in evening.\par Agree with addition of statin and aspirin, advised repeat lipid profile after 3 months of treatment, would aim for goal LDL < 70.\par Advised lifestyle management as above.\par Advised follow up with pulmonary to re-eval TJ and discuss treatment options.\par Contin other current meds.\par Follow up 3-4 mo.\par

## 2021-09-28 NOTE — REASON FOR VISIT
[Hypertension] : hypertension [Coronary Artery Disease] : coronary artery disease [FreeTextEntry3] : Dr Ginger Morrison

## 2021-11-09 ENCOUNTER — APPOINTMENT (OUTPATIENT)
Dept: GASTROENTEROLOGY | Facility: CLINIC | Age: 51
End: 2021-11-09

## 2021-12-28 ENCOUNTER — RX RENEWAL (OUTPATIENT)
Age: 51
End: 2021-12-28

## 2022-01-04 ENCOUNTER — RX RENEWAL (OUTPATIENT)
Age: 52
End: 2022-01-04

## 2022-01-05 ENCOUNTER — RX RENEWAL (OUTPATIENT)
Age: 52
End: 2022-01-05

## 2022-01-10 RX ORDER — METOPROLOL SUCCINATE 25 MG/1
25 TABLET, EXTENDED RELEASE ORAL DAILY
Qty: 30 | Refills: 5 | Status: DISCONTINUED | COMMUNITY
Start: 2020-12-15 | End: 2022-01-10

## 2022-01-24 ENCOUNTER — NON-APPOINTMENT (OUTPATIENT)
Age: 52
End: 2022-01-24

## 2022-02-01 ENCOUNTER — NON-APPOINTMENT (OUTPATIENT)
Age: 52
End: 2022-02-01

## 2022-02-01 ENCOUNTER — APPOINTMENT (OUTPATIENT)
Dept: CARDIOLOGY | Facility: CLINIC | Age: 52
End: 2022-02-01
Payer: COMMERCIAL

## 2022-02-01 VITALS — DIASTOLIC BLOOD PRESSURE: 76 MMHG | SYSTOLIC BLOOD PRESSURE: 110 MMHG

## 2022-02-01 VITALS
BODY MASS INDEX: 40.92 KG/M2 | HEIGHT: 68 IN | DIASTOLIC BLOOD PRESSURE: 80 MMHG | OXYGEN SATURATION: 98 % | HEART RATE: 65 BPM | WEIGHT: 270 LBS | SYSTOLIC BLOOD PRESSURE: 112 MMHG

## 2022-02-01 DIAGNOSIS — F17.290 NICOTINE DEPENDENCE, OTHER TOBACCO PRODUCT, UNCOMPLICATED: ICD-10-CM

## 2022-02-01 PROCEDURE — 93000 ELECTROCARDIOGRAM COMPLETE: CPT

## 2022-02-01 PROCEDURE — 36415 COLL VENOUS BLD VENIPUNCTURE: CPT

## 2022-02-01 PROCEDURE — 99213 OFFICE O/P EST LOW 20 MIN: CPT

## 2022-02-01 NOTE — DISCUSSION/SUMMARY
[FreeTextEntry1] : Same meds\par check labs\par Advised contin diet, exercise and wt loss\par Advised against using nicotine products\par Follow up 6 mo\par

## 2022-02-01 NOTE — HISTORY OF PRESENT ILLNESS
[FreeTextEntry1] : GLEN CAMACHO is a 51 year year old male here for routine follow up.\par He is exercising incl treadmill without complaints.\par He has occas mild chest heaviness at rest.\par No SOB.\par Occas vaping.\par Lost mild amount of weight.\par

## 2022-02-03 LAB
ALBUMIN SERPL ELPH-MCNC: 4.6 G/DL
ALP BLD-CCNC: 65 U/L
ALT SERPL-CCNC: 37 U/L
ANION GAP SERPL CALC-SCNC: 16 MMOL/L
AST SERPL-CCNC: 23 U/L
BILIRUB SERPL-MCNC: 0.4 MG/DL
BUN SERPL-MCNC: 14 MG/DL
CALCIUM SERPL-MCNC: 9.4 MG/DL
CHLORIDE SERPL-SCNC: 102 MMOL/L
CHOLEST SERPL-MCNC: 107 MG/DL
CO2 SERPL-SCNC: 21 MMOL/L
CREAT SERPL-MCNC: 0.64 MG/DL
GLUCOSE SERPL-MCNC: 104 MG/DL
HDLC SERPL-MCNC: 40 MG/DL
LDLC SERPL CALC-MCNC: 45 MG/DL
NONHDLC SERPL-MCNC: 67 MG/DL
POTASSIUM SERPL-SCNC: 4.5 MMOL/L
PROT SERPL-MCNC: 7.2 G/DL
SODIUM SERPL-SCNC: 140 MMOL/L
TRIGL SERPL-MCNC: 110 MG/DL

## 2022-03-19 ENCOUNTER — INPATIENT (INPATIENT)
Facility: HOSPITAL | Age: 52
LOS: 1 days | Discharge: HOME | DRG: 378 | End: 2022-03-21
Attending: HOSPITALIST | Admitting: HOSPITALIST
Payer: COMMERCIAL

## 2022-03-19 VITALS
SYSTOLIC BLOOD PRESSURE: 127 MMHG | TEMPERATURE: 99 F | HEIGHT: 68 IN | HEART RATE: 78 BPM | DIASTOLIC BLOOD PRESSURE: 82 MMHG | OXYGEN SATURATION: 99 % | WEIGHT: 265 LBS | RESPIRATION RATE: 20 BRPM

## 2022-03-19 DIAGNOSIS — Z98.89 OTHER SPECIFIED POSTPROCEDURAL STATES: Chronic | ICD-10-CM

## 2022-03-19 LAB — OB PNL STL: POSITIVE

## 2022-03-19 PROCEDURE — 99285 EMERGENCY DEPT VISIT HI MDM: CPT

## 2022-03-19 RX ORDER — SODIUM CHLORIDE 9 MG/ML
1000 INJECTION, SOLUTION INTRAVENOUS ONCE
Refills: 0 | Status: COMPLETED | OUTPATIENT
Start: 2022-03-19 | End: 2022-03-19

## 2022-03-19 RX ORDER — PANTOPRAZOLE SODIUM 20 MG/1
40 TABLET, DELAYED RELEASE ORAL ONCE
Refills: 0 | Status: COMPLETED | OUTPATIENT
Start: 2022-03-19 | End: 2022-03-19

## 2022-03-19 RX ADMIN — PANTOPRAZOLE SODIUM 40 MILLIGRAM(S): 20 TABLET, DELAYED RELEASE ORAL at 23:41

## 2022-03-19 RX ADMIN — SODIUM CHLORIDE 1000 MILLILITER(S): 9 INJECTION, SOLUTION INTRAVENOUS at 23:41

## 2022-03-19 NOTE — ED PROVIDER NOTE - PHYSICAL EXAMINATION
General: well appearing, no acute distress, AOx3  Skin: no rash, no pallor  Head: normocephalic, atraumatic  Eyes: clear conjunctiva, EOMI  ENMT: airway patent, no nasal discharge  Cardiovascular: normal rate, normal rhythm, S1/S2  Pulmonary: clear to auscultation bilaterally, no rales, rhonchi, or wheeze  Abdomen: soft, nontender, no guarding  Rectal exam: chaperoned by Dr. Rodriguez, brbpr, dark stool, sent for occult testing, no hemorrhoids, nontender   Musculoskeletal: moving extremities well, no deformity  Psych: normal mood, normal affect

## 2022-03-19 NOTE — ED ADULT NURSE NOTE - NSICDXPASTMEDICALHX_GEN_ALL_CORE_FT
Care Transitions Team: Following for CC, discharge planning, and disposition.      [facility] will have a TCU bed for pt. tomorrow, this was discussed with pt with acceptance. Private room, pt is aware private room fee's.   Pt has requested transport [facility], fees discussed.   HE W/C 1300 has been arranged for 5/17.     Will follow up in am and confirm plan.   PAS in process    May Morales RN, BSN, CTS  Care Transitions Team  [phone]     PAST MEDICAL HISTORY:  Anxiety     Obesity, morbid, BMI 40.0-49.9     Sleep apnea

## 2022-03-19 NOTE — ED ADULT NURSE NOTE - OBJECTIVE STATEMENT
Received patient with h/o HTN, CAD, bowel resection, c/o rectal bleeding and dark colored stool that started today. Patient denies SOB dizziness or pain, NAD awaits further evaluation.

## 2022-03-19 NOTE — ED PROVIDER NOTE - OBJECTIVE STATEMENT
Angel Bhakti, PGY-2- 52 year old male with a pmhx of diverticulosis, diverticulitis c/b bowel resection, lap band surgery, is brought to ED by EMS for evaluation of brbpr that began at 2030. Pt reports having normal BM at 1930. Had additional BM that was slightly loose at 2030. Reports 2 more episodes with about 1 cup of bright red blood each time in toilet. Reports dark stool. Feels queasy in abdomen but denies pain. No lightheadedness, dizziness, cp, sob, vomiting, dysuria. Was on abx 1 week ago for a dental implant (Amoxicillin). On ASA 81 mg.   PCP- Dr. Morrison  GI- Dr. Carrizales

## 2022-03-19 NOTE — ED ADULT NURSE NOTE - NSIMPLEMENTINTERV_GEN_ALL_ED
Implemented All Universal Safety Interventions:  [place] to call system. Call bell, personal items and telephone within reach. Instruct patient to call for assistance. Room bathroom lighting operational. Non-slip footwear when patient is off stretcher. Physically safe environment: no spills, clutter or unnecessary equipment. Stretcher in lowest position, wheels locked, appropriate side rails in place.

## 2022-03-19 NOTE — ED PROVIDER NOTE - PROGRESS NOTE DETAILS
Angel Ya, PGY-2- Repeat cbc ordered, plan to admit pt to the hospital for serial cbc testing, [place].

## 2022-03-19 NOTE — ED PROVIDER NOTE - CLINICAL SUMMARY MEDICAL DECISION MAKING FREE TEXT BOX
Angel Ya, PGY-2- 52 year old male with hx of GI bleed in the past, here with brbpr that began tonight. Pt with bright red blood on rectal exam. Plan to obtain cbc, cmp, type and screen, covid swab, occult testing. Vitals are stable on arrival. Will need admission

## 2022-03-19 NOTE — ED PROVIDER NOTE - ATTENDING CONTRIBUTION TO CARE
RGUJRAL 51yo male hx listed presents with BRBPR today. Pt reports a few BM followed by bloody movement. Denies any abdominal pain, n/v/f/c. Patient was on antibiotics 2 weeks ago but denies diarrhea. Patient is on aspirin 81mg.   On exam, Patient is awake,alert,oriented x 3. Patient is well appearing and in no acute distress. Patient's chest is clear to ausculation, +s1s2. Abdomen is soft nd/nt +BS. Extremity with no swelling or calf tenderness.   Denies weakness, lightheadedness.   Check labs, eval for GI bleed. IVF and closely monitor.

## 2022-03-20 DIAGNOSIS — I25.10 ATHEROSCLEROTIC HEART DISEASE OF NATIVE CORONARY ARTERY WITHOUT ANGINA PECTORIS: ICD-10-CM

## 2022-03-20 DIAGNOSIS — K92.2 GASTROINTESTINAL HEMORRHAGE, UNSPECIFIED: ICD-10-CM

## 2022-03-20 DIAGNOSIS — I10 ESSENTIAL (PRIMARY) HYPERTENSION: ICD-10-CM

## 2022-03-20 DIAGNOSIS — Z29.9 ENCOUNTER FOR PROPHYLACTIC MEASURES, UNSPECIFIED: ICD-10-CM

## 2022-03-20 DIAGNOSIS — D62 ACUTE POSTHEMORRHAGIC ANEMIA: ICD-10-CM

## 2022-03-20 LAB
ALBUMIN SERPL ELPH-MCNC: 4.5 G/DL — SIGNIFICANT CHANGE UP (ref 3.3–5)
ALP SERPL-CCNC: 62 U/L — SIGNIFICANT CHANGE UP (ref 40–120)
ALT FLD-CCNC: 38 U/L — SIGNIFICANT CHANGE UP (ref 10–45)
ANION GAP SERPL CALC-SCNC: 14 MMOL/L — SIGNIFICANT CHANGE UP (ref 5–17)
ANION GAP SERPL CALC-SCNC: 14 MMOL/L — SIGNIFICANT CHANGE UP (ref 5–17)
APTT BLD: 33.8 SEC — SIGNIFICANT CHANGE UP (ref 27.5–35.5)
APTT BLD: 35.7 SEC — HIGH (ref 27.5–35.5)
AST SERPL-CCNC: 21 U/L — SIGNIFICANT CHANGE UP (ref 10–40)
BASOPHILS # BLD AUTO: 0.07 K/UL — SIGNIFICANT CHANGE UP (ref 0–0.2)
BASOPHILS NFR BLD AUTO: 0.5 % — SIGNIFICANT CHANGE UP (ref 0–2)
BILIRUB SERPL-MCNC: 0.6 MG/DL — SIGNIFICANT CHANGE UP (ref 0.2–1.2)
BLD GP AB SCN SERPL QL: NEGATIVE — SIGNIFICANT CHANGE UP
BUN SERPL-MCNC: 17 MG/DL — SIGNIFICANT CHANGE UP (ref 7–23)
BUN SERPL-MCNC: 18 MG/DL — SIGNIFICANT CHANGE UP (ref 7–23)
CALCIUM SERPL-MCNC: 9.2 MG/DL — SIGNIFICANT CHANGE UP (ref 8.4–10.5)
CALCIUM SERPL-MCNC: 9.6 MG/DL — SIGNIFICANT CHANGE UP (ref 8.4–10.5)
CHLORIDE SERPL-SCNC: 101 MMOL/L — SIGNIFICANT CHANGE UP (ref 96–108)
CHLORIDE SERPL-SCNC: 104 MMOL/L — SIGNIFICANT CHANGE UP (ref 96–108)
CO2 SERPL-SCNC: 21 MMOL/L — LOW (ref 22–31)
CO2 SERPL-SCNC: 23 MMOL/L — SIGNIFICANT CHANGE UP (ref 22–31)
CREAT SERPL-MCNC: 0.74 MG/DL — SIGNIFICANT CHANGE UP (ref 0.5–1.3)
CREAT SERPL-MCNC: 0.78 MG/DL — SIGNIFICANT CHANGE UP (ref 0.5–1.3)
EGFR: 106 ML/MIN/1.73M2 — SIGNIFICANT CHANGE UP
EGFR: 108 ML/MIN/1.73M2 — SIGNIFICANT CHANGE UP
EOSINOPHIL # BLD AUTO: 0.16 K/UL — SIGNIFICANT CHANGE UP (ref 0–0.5)
EOSINOPHIL NFR BLD AUTO: 1.2 % — SIGNIFICANT CHANGE UP (ref 0–6)
GLUCOSE SERPL-MCNC: 102 MG/DL — HIGH (ref 70–99)
GLUCOSE SERPL-MCNC: 97 MG/DL — SIGNIFICANT CHANGE UP (ref 70–99)
HCT VFR BLD CALC: 36.1 % — LOW (ref 39–50)
HCT VFR BLD CALC: 36.4 % — LOW (ref 39–50)
HCT VFR BLD CALC: 37.5 % — LOW (ref 39–50)
HCT VFR BLD CALC: 39.4 % — SIGNIFICANT CHANGE UP (ref 39–50)
HGB BLD-MCNC: 12.4 G/DL — LOW (ref 13–17)
HGB BLD-MCNC: 12.4 G/DL — LOW (ref 13–17)
HGB BLD-MCNC: 12.6 G/DL — LOW (ref 13–17)
HGB BLD-MCNC: 13.4 G/DL — SIGNIFICANT CHANGE UP (ref 13–17)
IMM GRANULOCYTES NFR BLD AUTO: 0.5 % — SIGNIFICANT CHANGE UP (ref 0–1.5)
INR BLD: 1.21 RATIO — HIGH (ref 0.88–1.16)
INR BLD: 1.26 RATIO — HIGH (ref 0.88–1.16)
LYMPHOCYTES # BLD AUTO: 16.2 % — SIGNIFICANT CHANGE UP (ref 13–44)
LYMPHOCYTES # BLD AUTO: 2.16 K/UL — SIGNIFICANT CHANGE UP (ref 1–3.3)
MAGNESIUM SERPL-MCNC: 1.9 MG/DL — SIGNIFICANT CHANGE UP (ref 1.6–2.6)
MCHC RBC-ENTMCNC: 30 PG — SIGNIFICANT CHANGE UP (ref 27–34)
MCHC RBC-ENTMCNC: 30.3 PG — SIGNIFICANT CHANGE UP (ref 27–34)
MCHC RBC-ENTMCNC: 30.4 PG — SIGNIFICANT CHANGE UP (ref 27–34)
MCHC RBC-ENTMCNC: 30.5 PG — SIGNIFICANT CHANGE UP (ref 27–34)
MCHC RBC-ENTMCNC: 33.6 GM/DL — SIGNIFICANT CHANGE UP (ref 32–36)
MCHC RBC-ENTMCNC: 34 GM/DL — SIGNIFICANT CHANGE UP (ref 32–36)
MCHC RBC-ENTMCNC: 34.1 GM/DL — SIGNIFICANT CHANGE UP (ref 32–36)
MCHC RBC-ENTMCNC: 34.3 GM/DL — SIGNIFICANT CHANGE UP (ref 32–36)
MCV RBC AUTO: 87.2 FL — SIGNIFICANT CHANGE UP (ref 80–100)
MCV RBC AUTO: 89.3 FL — SIGNIFICANT CHANGE UP (ref 80–100)
MCV RBC AUTO: 89.7 FL — SIGNIFICANT CHANGE UP (ref 80–100)
MCV RBC AUTO: 90.1 FL — SIGNIFICANT CHANGE UP (ref 80–100)
MONOCYTES # BLD AUTO: 1.17 K/UL — HIGH (ref 0–0.9)
MONOCYTES NFR BLD AUTO: 8.8 % — SIGNIFICANT CHANGE UP (ref 2–14)
NEUTROPHILS # BLD AUTO: 9.67 K/UL — HIGH (ref 1.8–7.4)
NEUTROPHILS NFR BLD AUTO: 72.8 % — SIGNIFICANT CHANGE UP (ref 43–77)
NRBC # BLD: 0 /100 WBCS — SIGNIFICANT CHANGE UP (ref 0–0)
PHOSPHATE SERPL-MCNC: 2.9 MG/DL — SIGNIFICANT CHANGE UP (ref 2.5–4.5)
PLATELET # BLD AUTO: 260 K/UL — SIGNIFICANT CHANGE UP (ref 150–400)
PLATELET # BLD AUTO: 278 K/UL — SIGNIFICANT CHANGE UP (ref 150–400)
PLATELET # BLD AUTO: 284 K/UL — SIGNIFICANT CHANGE UP (ref 150–400)
PLATELET # BLD AUTO: 340 K/UL — SIGNIFICANT CHANGE UP (ref 150–400)
POTASSIUM SERPL-MCNC: 3.9 MMOL/L — SIGNIFICANT CHANGE UP (ref 3.5–5.3)
POTASSIUM SERPL-MCNC: 4.6 MMOL/L — SIGNIFICANT CHANGE UP (ref 3.5–5.3)
POTASSIUM SERPL-SCNC: 3.9 MMOL/L — SIGNIFICANT CHANGE UP (ref 3.5–5.3)
POTASSIUM SERPL-SCNC: 4.6 MMOL/L — SIGNIFICANT CHANGE UP (ref 3.5–5.3)
PROT SERPL-MCNC: 7.5 G/DL — SIGNIFICANT CHANGE UP (ref 6–8.3)
PROTHROM AB SERPL-ACNC: 14.1 SEC — HIGH (ref 10.5–13.4)
PROTHROM AB SERPL-ACNC: 14.6 SEC — HIGH (ref 10.5–13.4)
RBC # BLD: 4.06 M/UL — LOW (ref 4.2–5.8)
RBC # BLD: 4.14 M/UL — LOW (ref 4.2–5.8)
RBC # BLD: 4.16 M/UL — LOW (ref 4.2–5.8)
RBC # BLD: 4.41 M/UL — SIGNIFICANT CHANGE UP (ref 4.2–5.8)
RBC # FLD: 12.6 % — SIGNIFICANT CHANGE UP (ref 10.3–14.5)
RBC # FLD: 12.6 % — SIGNIFICANT CHANGE UP (ref 10.3–14.5)
RBC # FLD: 12.7 % — SIGNIFICANT CHANGE UP (ref 10.3–14.5)
RBC # FLD: 12.8 % — SIGNIFICANT CHANGE UP (ref 10.3–14.5)
RH IG SCN BLD-IMP: POSITIVE — SIGNIFICANT CHANGE UP
SARS-COV-2 RNA SPEC QL NAA+PROBE: SIGNIFICANT CHANGE UP
SODIUM SERPL-SCNC: 138 MMOL/L — SIGNIFICANT CHANGE UP (ref 135–145)
SODIUM SERPL-SCNC: 139 MMOL/L — SIGNIFICANT CHANGE UP (ref 135–145)
WBC # BLD: 12.29 K/UL — HIGH (ref 3.8–10.5)
WBC # BLD: 13.3 K/UL — HIGH (ref 3.8–10.5)
WBC # BLD: 7.81 K/UL — SIGNIFICANT CHANGE UP (ref 3.8–10.5)
WBC # BLD: 7.96 K/UL — SIGNIFICANT CHANGE UP (ref 3.8–10.5)
WBC # FLD AUTO: 12.29 K/UL — HIGH (ref 3.8–10.5)
WBC # FLD AUTO: 13.3 K/UL — HIGH (ref 3.8–10.5)
WBC # FLD AUTO: 7.81 K/UL — SIGNIFICANT CHANGE UP (ref 3.8–10.5)
WBC # FLD AUTO: 7.96 K/UL — SIGNIFICANT CHANGE UP (ref 3.8–10.5)

## 2022-03-20 PROCEDURE — 99223 1ST HOSP IP/OBS HIGH 75: CPT

## 2022-03-20 RX ORDER — ATORVASTATIN CALCIUM 80 MG/1
10 TABLET, FILM COATED ORAL AT BEDTIME
Refills: 0 | Status: DISCONTINUED | OUTPATIENT
Start: 2022-03-20 | End: 2022-03-21

## 2022-03-20 RX ORDER — ALPRAZOLAM 0.25 MG
1 TABLET ORAL
Qty: 0 | Refills: 0 | DISCHARGE

## 2022-03-20 RX ORDER — ACETAMINOPHEN 500 MG
650 TABLET ORAL EVERY 6 HOURS
Refills: 0 | Status: DISCONTINUED | OUTPATIENT
Start: 2022-03-20 | End: 2022-03-21

## 2022-03-20 RX ORDER — PANTOPRAZOLE SODIUM 20 MG/1
40 TABLET, DELAYED RELEASE ORAL
Refills: 0 | Status: DISCONTINUED | OUTPATIENT
Start: 2022-03-20 | End: 2022-03-21

## 2022-03-20 RX ADMIN — ATORVASTATIN CALCIUM 10 MILLIGRAM(S): 80 TABLET, FILM COATED ORAL at 21:25

## 2022-03-20 RX ADMIN — PANTOPRAZOLE SODIUM 40 MILLIGRAM(S): 20 TABLET, DELAYED RELEASE ORAL at 06:26

## 2022-03-20 RX ADMIN — PANTOPRAZOLE SODIUM 40 MILLIGRAM(S): 20 TABLET, DELAYED RELEASE ORAL at 17:16

## 2022-03-20 RX ADMIN — Medication 650 MILLIGRAM(S): at 12:44

## 2022-03-20 RX ADMIN — Medication 650 MILLIGRAM(S): at 12:14

## 2022-03-20 NOTE — H&P ADULT - PROBLEM SELECTOR PLAN 3
- hold home losartan 50mg BID, amlodipine 5mg qd, metoprolol 25mg qd in the setting of GI bleed  - watch pressures closely

## 2022-03-20 NOTE — H&P ADULT - NSHPSOCIALHISTORY_GEN_ALL_CORE
Former smoker, quit 5 years ago. Currently vapes. occasional alcohol use. 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

## 2022-03-20 NOTE — H&P ADULT - PROBLEM SELECTOR PLAN 1
Patient presents with hematochezia as well as melena concerning for brisk upper GI bleed vs lower GI bleed, possibly diverticular bleed, vs IBD, angioectasia, colonic mass, ulcer. Rectal exam in the ED with dark stool, fecal occult positive.   - Hgb dropped from 13.4 to 12.4 since admission  - started protonix 40IV BID  - check CBC q8, transfuse for Hgb <7  - maintain active T&S  - keep NPO for now  - GI consult emailed, f/u recs

## 2022-03-20 NOTE — H&P ADULT - HISTORY OF PRESENT ILLNESS
52M with PMH of diverticulosis c/b diverticulitis s/p bowel resection, lap band surgery, CAD on aspirin, presents to the ED due to BRBPR and melena that started last night. Patient has had about 8 BMs with dark black stools as well as BRBPR and passing of some clots. Patient reports stools were loose with about 1 cup of bright red blood each time. He also has some abdominal bloating but no pain. Patient denies any fevers, chest pain, shortness of breath. He endorses some fatigue. No nausea, vomiting, or dysuria. Of note, patient says he had a similar episode about 2 years ago which resolved by the time he had a colonoscopy. Patient was on antibiotics (amoxicillin) about 1 week ago for a dental implant however denies any recent use of NSAIDs. He has been under a lot of stress due to work.     In the ED, T is 99.3, HR 78, /82, RR 20 satting 99% on room air. Patient received 40IV protonix X1, 1L LR.

## 2022-03-20 NOTE — H&P ADULT - NSICDXFAMILYHX_GEN_ALL_CORE_FT
FAMILY HISTORY:  Father  Still living? Unknown  FH: diverticulitis, Age at diagnosis: Age Unknown

## 2022-03-20 NOTE — H&P ADULT - NSICDXPASTMEDICALHX_GEN_ALL_CORE_FT
PAST MEDICAL HISTORY:  Anxiety     Obesity, morbid, BMI 40.0-49.9     Sleep apnea      PAST MEDICAL HISTORY:  Anxiety     CAD (coronary artery disease)     Hypertension     Obesity, morbid, BMI 40.0-49.9     Sleep apnea

## 2022-03-20 NOTE — H&P ADULT - ASSESSMENT
52M with PMH of diverticulosis c/b diverticulitis s/p bowel resection, lap band surgery, CAD on aspirin, presents to the ED due to BRBPR and melena for one day concerning for upper vs lower GI bleed

## 2022-03-20 NOTE — PATIENT PROFILE ADULT - FALL HARM RISK - UNIVERSAL INTERVENTIONS
Bed in lowest position, wheels locked, appropriate side rails in place/Call bell, personal items and telephone in reach/Instruct patient to call for assistance before getting out of bed or chair/Non-slip footwear when patient is out of bed/[place] to call system/Physically safe environment - no spills, clutter or unnecessary equipment/Purposeful Proactive Rounding/Room/bathroom lighting operational, light cord in reach

## 2022-03-20 NOTE — H&P ADULT - VASCULAR
"Patient reports feeling hot, \"hot flashes, \"going on and off,\" like flushing.  It was mild off and on then more powerful full on hot feeling.\"  Patient vital signs are stable.  Infusion stopped.  R[place] called--unable to reach.    Dot Rios RN  July 20, 2017  1438    " detailed exam

## 2022-03-20 NOTE — PATIENT PROFILE ADULT - IS THERE A SUSPICION OF ABUSE/NEGLIGENCE?
What Type Of Note Output Would You Prefer (Optional)?: Bullet Format How Severe Is Your Skin Lesion?: mild Has Your Skin Lesion Been Treated?: not been treated Is This A New Presentation, Or A Follow-Up?: Mole no

## 2022-03-20 NOTE — CHART NOTE - NSCHARTNOTEFT_GEN_A_CORE
Please see H&P from earlier today. Patient states having no further bloody BMs today (of note, RN documentation notes bloody BM this AM). Noted to have lower BP on AM VS, but denies dizziness or other issues, aside from tiredness attributed to poor sleep. Awaiting GI referral, remains NPO for now. Will check H/H at 1PM today, maintain active T&S.    ______________  Adan Richards MD  [facility]  [phone]

## 2022-03-21 ENCOUNTER — TRANSCRIPTION ENCOUNTER (OUTPATIENT)
Age: 52
End: 2022-03-21

## 2022-03-21 VITALS
DIASTOLIC BLOOD PRESSURE: 93 MMHG | OXYGEN SATURATION: 94 % | RESPIRATION RATE: 18 BRPM | TEMPERATURE: 98 F | HEART RATE: 105 BPM | SYSTOLIC BLOOD PRESSURE: 146 MMHG

## 2022-03-21 LAB
ANION GAP SERPL CALC-SCNC: 14 MMOL/L — SIGNIFICANT CHANGE UP (ref 5–17)
BUN SERPL-MCNC: 14 MG/DL — SIGNIFICANT CHANGE UP (ref 7–23)
CALCIUM SERPL-MCNC: 9.1 MG/DL — SIGNIFICANT CHANGE UP (ref 8.4–10.5)
CHLORIDE SERPL-SCNC: 103 MMOL/L — SIGNIFICANT CHANGE UP (ref 96–108)
CO2 SERPL-SCNC: 21 MMOL/L — LOW (ref 22–31)
CREAT SERPL-MCNC: 0.72 MG/DL — SIGNIFICANT CHANGE UP (ref 0.5–1.3)
EGFR: 109 ML/MIN/1.73M2 — SIGNIFICANT CHANGE UP
GLUCOSE SERPL-MCNC: 93 MG/DL — SIGNIFICANT CHANGE UP (ref 70–99)
HCT VFR BLD CALC: 35.1 % — LOW (ref 39–50)
HGB BLD-MCNC: 12 G/DL — LOW (ref 13–17)
MCHC RBC-ENTMCNC: 30.5 PG — SIGNIFICANT CHANGE UP (ref 27–34)
MCHC RBC-ENTMCNC: 34.2 GM/DL — SIGNIFICANT CHANGE UP (ref 32–36)
MCV RBC AUTO: 89.1 FL — SIGNIFICANT CHANGE UP (ref 80–100)
NRBC # BLD: 0 /100 WBCS — SIGNIFICANT CHANGE UP (ref 0–0)
PLATELET # BLD AUTO: 280 K/UL — SIGNIFICANT CHANGE UP (ref 150–400)
POTASSIUM SERPL-MCNC: 3.8 MMOL/L — SIGNIFICANT CHANGE UP (ref 3.5–5.3)
POTASSIUM SERPL-SCNC: 3.8 MMOL/L — SIGNIFICANT CHANGE UP (ref 3.5–5.3)
RBC # BLD: 3.94 M/UL — LOW (ref 4.2–5.8)
RBC # FLD: 12.8 % — SIGNIFICANT CHANGE UP (ref 10.3–14.5)
SODIUM SERPL-SCNC: 138 MMOL/L — SIGNIFICANT CHANGE UP (ref 135–145)
WBC # BLD: 8.07 K/UL — SIGNIFICANT CHANGE UP (ref 3.8–10.5)
WBC # FLD AUTO: 8.07 K/UL — SIGNIFICANT CHANGE UP (ref 3.8–10.5)

## 2022-03-21 PROCEDURE — U0005: CPT

## 2022-03-21 PROCEDURE — 85610 PROTHROMBIN TIME: CPT

## 2022-03-21 PROCEDURE — 80048 BASIC METABOLIC PNL TOTAL CA: CPT

## 2022-03-21 PROCEDURE — 86901 BLOOD TYPING SEROLOGIC RH(D): CPT

## 2022-03-21 PROCEDURE — 85025 COMPLETE CBC W/AUTO DIFF WBC: CPT

## 2022-03-21 PROCEDURE — 85730 THROMBOPLASTIN TIME PARTIAL: CPT

## 2022-03-21 PROCEDURE — 36415 COLL VENOUS BLD VENIPUNCTURE: CPT

## 2022-03-21 PROCEDURE — 82272 OCCULT BLD FECES 1-3 TESTS: CPT

## 2022-03-21 PROCEDURE — 99285 EMERGENCY DEPT VISIT HI MDM: CPT

## 2022-03-21 PROCEDURE — U0003: CPT

## 2022-03-21 PROCEDURE — 83735 ASSAY OF MAGNESIUM: CPT

## 2022-03-21 PROCEDURE — 99222 1ST HOSP IP/OBS MODERATE 55: CPT

## 2022-03-21 PROCEDURE — 85027 COMPLETE CBC AUTOMATED: CPT

## 2022-03-21 PROCEDURE — 96374 THER/PROPH/DIAG INJ IV PUSH: CPT

## 2022-03-21 PROCEDURE — 86850 RBC ANTIBODY SCREEN: CPT

## 2022-03-21 PROCEDURE — 99239 HOSP IP/OBS DSCHRG MGMT >30: CPT

## 2022-03-21 PROCEDURE — 86900 BLOOD TYPING SEROLOGIC ABO: CPT

## 2022-03-21 PROCEDURE — 84100 ASSAY OF PHOSPHORUS: CPT

## 2022-03-21 PROCEDURE — 80053 COMPREHEN METABOLIC PANEL: CPT

## 2022-03-21 RX ORDER — LOSARTAN POTASSIUM 100 MG/1
1 TABLET, FILM COATED ORAL
Qty: 0 | Refills: 0 | DISCHARGE

## 2022-03-21 RX ORDER — ASPIRIN/CALCIUM CARB/MAGNESIUM 324 MG
1 TABLET ORAL
Qty: 0 | Refills: 0 | DISCHARGE

## 2022-03-21 RX ADMIN — PANTOPRAZOLE SODIUM 40 MILLIGRAM(S): 20 TABLET, DELAYED RELEASE ORAL at 05:29

## 2022-03-21 NOTE — DISCHARGE NOTE PROVIDER - NSDCCPCAREPLAN_GEN_ALL_CORE_FT
PRINCIPAL DISCHARGE DIAGNOSIS  Diagnosis: GI bleed  Assessment and Plan of Treatment: You had presumed diverticular bleeding which resolved. Follow up with your gastroentologist for further monitoring, including blood tests as appropriate and future colonoscopies as determined by your gastroenterologist or surgeon. Return to hospital if bleeding recurs. Hold aspirin until discussed with home gastroenterologist      SECONDARY DISCHARGE DIAGNOSES  Diagnosis: CAD (coronary artery disease)  Assessment and Plan of Treatment: No history of stents. Hold aspirin until discussion with home gastroenterologist. When restarted, monitor for signs of bleeding.    Diagnosis: Hypertension  Assessment and Plan of Treatment: Continue blood pressure meds. Follow up with primary doctor Dr Morrison

## 2022-03-21 NOTE — CONSULT NOTE ADULT - ATTENDING COMMENTS
51 yo M pmh diverticulosis c/b diverticulitis s/p resection, h/o diverticular bleed who presents for BRBPR.  Stable hgb and vitals.  Bleeding stopped over past 24 hours.  Patient declining any inpatient workup at this time.  Most likely repeat diverticular bleed, however given ? of 'darker' stools at one point -> would plan for egd/colonoscopy as outpatient.  He follows with Tyler Scott and should follow up in 2 weeks post discharge.  Reviewed all with patient.

## 2022-03-21 NOTE — DISCHARGE NOTE PROVIDER - CARE PROVIDER_API CALL
Tyler Scott; FACP)  Gastroenterology; Internal Medicine  [address] [facility], [address]  [place] [address]  Phone: [phone]  Fax: [phone]  Established Patient  Follow Up Time: 1 week   Tyler Scott; FACP)  Gastroenterology; Internal Medicine  [address] [facility], [address]  [place] [address]  Phone: [phone]  Fax: [phone]  Established Patient  Follow Up Time: 1 week    Ginger Morrison)  Cardio [facility]  [address] [place] [address]  Phone: [phone]  Fax: [phone]  Follow Up Time: 2 weeks   Tyler Scott; FACP)  Gastroenterology; Internal Medicine  [address] [facility]  [place] [address]  Phone: [phone]  Fax: [phone]  Established Patient  Follow Up Time: 1 week    G[place] [address]  Phone: [phone]  Fax: [phone]  Follow Up Time: 2 weeks    Flako Spencer)  Cardiovascular Disease; Internal Medicine  [address] [place] [address]  Phone: [phone]  Fax: [phone]  Established Patient  Follow Up Time: 2 weeks

## 2022-03-21 NOTE — DISCHARGE NOTE PROVIDER - NSDCFUADDAPPT_GEN_ALL_CORE_FT
APPTS ARE READY TO BE MADE: [x ] YES    Best Family or Patient Contact (if needed):    Additional Information about above appointments (if needed):    1: Reason for appt: recent GI bleed  2:   3:     Other comments or requests:    APPTS ARE READY TO BE MADE: [x ] YES    Best Family or Patient Contact (if needed):    Additional Information about above appointments (if needed):    1: Reason for appt: recent GI bleed, holding aspirin  2:   3:     Other comments or requests:    APPTS ARE READY TO BE MADE: [x ] YES    Best Family or Patient Contact (if needed):    Additional Information about above appointments (if needed):    1: Reason for appt: recent GI bleed, holding aspirin  2:   3:     Other comments or requests:   Previously scheduled with Dr. Spencer for 3/29 at 3pm.    Previously scheduled with Dr. Scott for 3/28 at 12pm.

## 2022-03-21 NOTE — DISCHARGE NOTE NURSING/CASE MANAGEMENT/SOCIAL WORK - NSDCPEFALRISK_GEN_ALL_CORE
For information on Fall & Injury Prevention, visit: [email or web address] OR  [email or web address] OR  https://www.cdc.gov/steadi/patient.html

## 2022-03-21 NOTE — DISCHARGE NOTE PROVIDER - NSDCFUSCHEDAPPT_GEN_ALL_CORE_FT
GLEN CAMACHO ; 03/28/2022 ; NPP Gastro [address]  GLEN CAMACHO ; 03/29/2022 ; NPP CARDIOLOGY [address] [facility]

## 2022-03-21 NOTE — CONSULT NOTE ADULT - ASSESSMENT
52M with PMH of diverticulosis c/b diverticulitis s/p bowel resection, lap band surgery, CAD on aspirin, presents to the ED due to BRBPR and dark stool that started Saturday.    Impression:  #Hematochezia - likely lower GI bleed given description and most recently BM small formed dark brown stool. Ddx includes diverticular bleed vs angioectasia vs malignancy vs less likely hemorrhoids.     Recommendation:  - hgb stable and overt bleeding stopped, likely diverticular and patient has had full colonoscopy in 2019 with good prep  - discussed role of repeat colonoscopy and upper endoscopy given report of darker stool (although lower suspicion for upper GI bleed given normal BUN and normal hemodynamics) and patient would prefer to do it outpatient with his GI, Dr Scott  - will f/u this AM if any further bleeding and need for inpatient procedure  - clear liquids    Note not finalized until signed by attending.    Yudith Luther PGY-5  Gastroenterology Fellow  Pager #[phone] (TIMOTHY) or [phone] (NS)  Available on [employer] Teams.  Please contact on-call GI fellow via answering service ([phone]) after 5pm and before 8am, and on weekends.

## 2022-03-21 NOTE — DISCHARGE NOTE PROVIDER - NSDCPNSUBOBJ_GEN_ALL_CORE
Patient seen and examined at bedside.  No recent bleeding episodes. Patient feels well. Vitals/Exam/Labs wnl  Reviewed CAD hx - he has no stents or cardiac sx, hx of mild nonobstructive disease identified via CT coronaries a few months ago  we reviewed in detail r/b of ASA in the setting of presumed diverticular bleeding  Pt will hold ASA until discussion with GI as outpatient, then likely cautiously restart. Pt educated to monitor for bleeding and to return to hospital if bleeding recurs given his significant hx  I advised pt to wait until after lunch for dc but pt wants to leave hospital as soon as possible which we will accommodate    38 minutes spent orchestrating discharge

## 2022-03-21 NOTE — DISCHARGE NOTE PROVIDER - NSDCMRMEDTOKEN_GEN_ALL_CORE_FT
amLODIPine 5 mg oral tablet: 1 tab(s) orally once a day  aspirin 81 mg oral delayed release tablet: 1 tab(s) orally once a day  atorvastatin 10 mg oral tablet: 1 tab(s) orally once a day  losartan 50 mg oral tablet: 1 tab(s) orally 2 times a day  metoprolol succinate 25 mg oral tablet, extended release: 1 tab(s) orally once a day   amLODIPine 5 mg oral tablet: 1 tab(s) orally once a day  atorvastatin 10 mg oral tablet: 1 tab(s) orally once a day  losartan 50 mg oral tablet: 1 tab(s) orally once a day  metoprolol succinate 25 mg oral tablet, extended release: 1 tab(s) orally once a day

## 2022-03-21 NOTE — DISCHARGE NOTE NURSING/CASE MANAGEMENT/SOCIAL WORK - NSDCFUADDAPPT_GEN_ALL_CORE_FT
APPTS ARE READY TO BE MADE: [x ] YES    Best Family or Patient Contact (if needed):    Additional Information about above appointments (if needed):    1: Reason for appt: recent GI bleed, holding aspirin  2:   3:     Other comments or requests:

## 2022-03-21 NOTE — DISCHARGE NOTE PROVIDER - HOSPITAL COURSE
52M with PMH of diverticulosis c/b diverticulitis s/p bowel resection, lap band surgery, CAD on aspirin, presents to the ED due to BRBPR and melena that started last night. Patient has had about 8 BMs with dark black stools as well as BRBPR and passing of some clots. Patient reports stools were loose with about 1 cup of bright red blood each time. He also has some abdominal bloating but no pain. Patient denies any fevers, chest pain, shortness of breath. He endorses some fatigue. No nausea, vomiting, or dysuria. Of note, patient says he had a similar episode about 2 years ago which resolved by the time he had a colonoscopy. Patient was on antibiotics (amoxicillin) about 1 week ago for a dental implant however denies any recent use of NSAIDs. He has been under a lot of stress due to work.     Patient seen by GI, no plans for scope, CBC stable, bleeding episodes resolved  Patient stable for discharge with plans to f/u w GI, PCP, cardiologist  Holding ASA for moment - no hx of stents    discharge diagnoses  1) Hematochezia, presumed diverticular bleeding  2) Nonobstructive CAD  3) h/o lap band surgery

## 2022-03-21 NOTE — CHART NOTE - NSCHARTNOTEFT_GEN_A_CORE
Patient was advised of follow up requests and asked for scheduling assistance. Will call physicians' offices during normal business hours to schedule.

## 2022-03-21 NOTE — DISCHARGE NOTE PROVIDER - PROVIDER TOKENS
PROVIDER:[TOKEN:[[id number]],FOLLOWUP:[1 week],ESTABLISHEDPATIENT:[T]] PROVIDER:[TOKEN:[[id number]],FOLLOWUP:[1 week],ESTABLISHEDPATIENT:[T]],PROVIDER:[TOKEN:[[id number]],FOLLOWUP:[2 weeks]] PROVIDER:[TOKEN:[[id number]],FOLLOWUP:[1 week],ESTABLISHEDPATIENT:[T]],PROVIDER:[TOKEN:[[id number]],FOLLOWUP:[2 weeks]],PROVIDER:[TOKEN:[[id number]],FOLLOWUP:[2 weeks],ESTABLISHEDPATIENT:[T]]

## 2022-03-21 NOTE — DISCHARGE NOTE PROVIDER - CARE PROVIDERS DIRECT ADDRESSES
,[email or web address]

## 2022-03-21 NOTE — CONSULT NOTE ADULT - SUBJECTIVE AND OBJECTIVE BOX
Chief Complaint:  Patient is a 52y old  Male who presents with a chief complaint of BRBPR (20 Mar 2022 04:50)      HPI: 52M with PMH of diverticulosis c/b diverticulitis s/p bowel resection, lap band surgery, CAD on aspirin, presents to the ED due to BRBPR and dark stool that started Saturday. Patient had about 8 BMs with dark stool however mostly liquid BRBPR and passing of some clots. Patient reports stools were loose with about 1 cup of bright red blood each time. He also has some abdominal bloating but no pain. No other symptoms. Patient was on antibiotics (amoxicillin) about 1 week ago for a dental implant however denies any recent use of NSAIDs. He had a small BM Sunday morning that was dark brown.    Had colonosopy 2019 with good prep with diverticulosis, without masses.     Allergies:  No Known Allergies      Home Medications:    Hospital Medications:  acetaminophen     Tablet .. 650 milliGRAM(s) Oral every 6 hours PRN  atorvastatin 10 milliGRAM(s) Oral at bedtime  pantoprazole  Injectable 40 milliGRAM(s) IV Push two times a day      PMHX/PSHX:  Hypertension    Sleep apnea    Obesity, morbid, BMI 40.0-49.9    Anxiety    Hypertension    CAD (coronary artery disease)    S/P small bowel resection    History of umbilical hernia repair        Family history:  Family history of coronary artery disease    Family history of hypertension    FH: diverticulitis (Father)        Social History:     ROS:     General:  No weight loss, fevers, chills, night sweats, fatigue  Eyes:  No vision changes, no yellowing of eyes   ENT:  No throat pain, runny nose  CV:  No chest pain, palpitations  Resp:  No SOB, cough, wheezing  GI:  See HPI  :  No burning with urination, no hematuria   Muscle:  No muscle pain, weakness  Neuro:  No numbness/tingling, memory problems  Psych:  No fatigue, insomnia, mood problems  Heme:  No easy bruisability  Skin:  No rash, itching       PHYSICAL EXAM:     GENERAL:  Appears stated age, well-groomed, well-nourished, no distress  HEENT:  NC/AT,  conjunctivae clear and pink,  no JVD  CHEST:  Full & symmetric excursion, no increased effort, breath sounds clear  HEART:  Regular rhythm, S1, S2, no murmur/rub/S3/S4, no abdominal bruit, no edema  ABDOMEN:  Soft, non-tender, non-distended, normoactive bowel sounds,  no masses ,  EXTREMITIES:  no cyanosis,clubbing or edema  SKIN:  No rash/erythema/ecchymoses/petechiae/wounds/abscess/warm/dry  NEURO:  Alert, oriented    Vital Signs:  Vital Signs Last 24 Hrs  T(C): 36.5 (20 Mar 2022 20:36), Max: 36.5 (20 Mar 2022 11:56)  T(F): 97.7 (20 Mar 2022 20:36), Max: 97.7 (20 Mar 2022 11:56)  HR: 90 (20 Mar 2022 20:36) (76 - 90)  BP: 133/88 (20 Mar 2022 20:36) (95/60 - 133/88)  BP(mean): --  RR: 18 (20 Mar 2022 20:36) (18 - 18)  SpO2: 96% (20 Mar 2022 20:36) (96% - 98%)  Daily     Daily     LABS:                        12.4   7.81  )-----------( 278      ( 20 Mar 2022 22:08 )             36.1     03-20    139  |  104  |  17  ----------------------------<  102<H>  3.9   |  21<L>  |  0.74    Ca    9.2      20 Mar 2022 06:31  Phos  2.9     03-20  Mg     1.9     03-20    TPro  7.5  /  Alb  4.5  /  TBili  0.6  /  DBili  x   /  AST  21  /  ALT  38  /  AlkPhos  62  03-19    LIVER FUNCTIONS - ( 19 Mar 2022 23:52 )  Alb: 4.5 g/dL / Pro: 7.5 g/dL / ALK PHOS: 62 U/L / ALT: 38 U/L / AST: 21 U/L / GGT: x           PT/INR - ( 20 Mar 2022 06:31 )   PT: 14.6 sec;   INR: 1.26 ratio         PTT - ( 20 Mar 2022 06:31 )  PTT:33.8 sec        Imaging:

## 2022-03-22 ENCOUNTER — NON-APPOINTMENT (OUTPATIENT)
Age: 52
End: 2022-03-22

## 2022-03-22 PROBLEM — I25.10 ATHEROSCLEROTIC HEART DISEASE OF NATIVE CORONARY ARTERY WITHOUT ANGINA PECTORIS: Chronic | Status: ACTIVE | Noted: 2022-03-20

## 2022-03-22 PROBLEM — I10 ESSENTIAL (PRIMARY) HYPERTENSION: Chronic | Status: ACTIVE | Noted: 2022-03-20

## 2022-03-28 ENCOUNTER — APPOINTMENT (OUTPATIENT)
Dept: GASTROENTEROLOGY | Facility: CLINIC | Age: 52
End: 2022-03-28
Payer: COMMERCIAL

## 2022-03-28 VITALS
WEIGHT: 270 LBS | TEMPERATURE: 97.7 F | SYSTOLIC BLOOD PRESSURE: 110 MMHG | DIASTOLIC BLOOD PRESSURE: 70 MMHG | OXYGEN SATURATION: 98 % | BODY MASS INDEX: 40.92 KG/M2 | HEART RATE: 75 BPM | HEIGHT: 68 IN

## 2022-03-28 DIAGNOSIS — Z83.79 FAMILY HISTORY OF OTHER DISEASES OF THE DIGESTIVE SYSTEM: ICD-10-CM

## 2022-03-28 DIAGNOSIS — Z80.0 FAMILY HISTORY OF MALIGNANT NEOPLASM OF DIGESTIVE ORGANS: ICD-10-CM

## 2022-03-28 DIAGNOSIS — Z80.1 FAMILY HISTORY OF MALIGNANT NEOPLASM OF TRACHEA, BRONCHUS AND LUNG: ICD-10-CM

## 2022-03-28 DIAGNOSIS — Z98.84 BARIATRIC SURGERY STATUS: ICD-10-CM

## 2022-03-28 PROCEDURE — 36415 COLL VENOUS BLD VENIPUNCTURE: CPT

## 2022-03-28 PROCEDURE — 99215 OFFICE O/P EST HI 40 MIN: CPT | Mod: 25

## 2022-03-28 RX ORDER — ALPRAZOLAM 0.5 MG/1
0.5 TABLET ORAL
Qty: 60 | Refills: 0 | Status: ACTIVE | COMMUNITY
Start: 2022-03-22

## 2022-03-28 RX ORDER — ALPRAZOLAM 0.25 MG/1
0.25 TABLET ORAL
Qty: 15 | Refills: 0 | Status: DISCONTINUED | COMMUNITY
Start: 2017-03-27 | End: 2022-03-28

## 2022-03-28 NOTE — CONSULT LETTER
[FreeTextEntry1] : Dear Dr. Ginger Morrison,\par \[facility] I had the pleasure of seeing your patient GLEN CAMACHO in the office today.  My office note is attached. PLEASE READ THE "ASSESSMENT" SECTION OF THE NOTE TO SEE MY IMPRESSION AND PLAN.\par \[facility] Thank you very much for allowing me to participate in the care of your patient.\par \[facility] Sincerely,\[facility] \[facility] Tyler Scott M.D., FAC, [facility] Director, Celiac Program at [facility]  of Medicine\[facility] and [employer] at [facility] Practice Director,[facility] - Gastroenterology/Internal Medicine at [place]\[address]\[place] [address]\[facility] Tel: [phone]\[facility] Email: [email or web address] \[facility] \[facility] The attached note has been created using a voice recognition system (Dragon).  There may be some misspellings and typos.  Please call my office if you have any issues or questions.

## 2022-03-28 NOTE — HISTORY OF PRESENT ILLNESS
[FreeTextEntry1] : The patient is a 52-year-old man seen by me for the first time since 2016.  We spent some time reviewing the patient's history.  The patient has a history of a diverticular bleed in 2019.  The patient was well until 8 days ago when he had 4 large bloody bowel movements.  He went to the [facility] where he had 4-5 more bloody bowel movements.  He was in the hospital for 2 nights and observed.  He was sent home 1 week ago.  After discharge, the patient passed 1 black bowel movement with blood and then had some blood streaking on brown stool for a few days.  He is now back to having a brown bowel movement once a day.  He was fatigued last week but now is feeling better.  He denies heartburn, dysphagia, or abdominal pain.  Hemoglobin and hematocrit were 12.0 and 35.1 respectively on March 21.  Of note, the patient's mother had colon cancer.  His last colonoscopy was performed in the hospital in 2019.  I was unable to find the results.  The patient has had no other hospitalizations in the past year.  He has a history of mild coronary artery disease.

## 2022-03-28 NOTE — ASSESSMENT
[FreeTextEntry1] : Patient status post lower GI bleed which is most likely diverticular in nature.  He has not had a colonoscopy for 3 years.  He has a mother with colon cancer.\par \par Blood work was sent for CBC and iron studies.\par \par A colonoscopy has been scheduled. The risks, benefits, alternatives, and limitations of the procedure, including the possibility of missed lesions, were explained.  The patient will require anesthesia evaluation prior to the procedure given his BMI of 41.05.\par \par Disability forms were completed for the patient.

## 2022-03-29 ENCOUNTER — RX RENEWAL (OUTPATIENT)
Age: 52
End: 2022-03-29

## 2022-03-29 ENCOUNTER — APPOINTMENT (OUTPATIENT)
Dept: CARDIOLOGY | Facility: CLINIC | Age: 52
End: 2022-03-29
Payer: COMMERCIAL

## 2022-03-29 VITALS
HEIGHT: 68 IN | DIASTOLIC BLOOD PRESSURE: 68 MMHG | WEIGHT: 270 LBS | HEART RATE: 79 BPM | OXYGEN SATURATION: 98 % | SYSTOLIC BLOOD PRESSURE: 110 MMHG | BODY MASS INDEX: 40.92 KG/M2

## 2022-03-29 LAB
BASOPHILS # BLD AUTO: 0.06 K/UL
BASOPHILS NFR BLD AUTO: 0.6 %
EOSINOPHIL # BLD AUTO: 0.14 K/UL
EOSINOPHIL NFR BLD AUTO: 1.5 %
FERRITIN SERPL-MCNC: 113 NG/ML
HCT VFR BLD CALC: 36 %
HGB BLD-MCNC: 12.3 G/DL
IMM GRANULOCYTES NFR BLD AUTO: 0.4 %
IRON SATN MFR SERPL: 16 %
IRON SERPL-MCNC: 59 UG/DL
LYMPHOCYTES # BLD AUTO: 1.43 K/UL
LYMPHOCYTES NFR BLD AUTO: 15.4 %
MAN DIFF?: NORMAL
MCHC RBC-ENTMCNC: 30.5 PG
MCHC RBC-ENTMCNC: 34.2 GM/DL
MCV RBC AUTO: 89.3 FL
MONOCYTES # BLD AUTO: 0.72 K/UL
MONOCYTES NFR BLD AUTO: 7.8 %
NEUTROPHILS # BLD AUTO: 6.9 K/UL
NEUTROPHILS NFR BLD AUTO: 74.3 %
PLATELET # BLD AUTO: 352 K/UL
RBC # BLD: 4.03 M/UL
RBC # FLD: 12.9 %
TIBC SERPL-MCNC: 357 UG/DL
UIBC SERPL-MCNC: 299 UG/DL
WBC # FLD AUTO: 9.29 K/UL

## 2022-03-29 PROCEDURE — 99214 OFFICE O/P EST MOD 30 MIN: CPT

## 2022-03-29 NOTE — HISTORY OF PRESENT ILLNESS
[FreeTextEntry1] : Status post recent hospitalization for 2 days due to LGIB felt to be secondary to diverticulosis. Had similar presentation 3 years ago.  Did not require transfusion.  Aspirin was held and losartan reduced to QD.\par Since home had minor additional bleeding.  BP has been good at home.\par No chest pain, SOB, or dizziness.\par He is scheduled for colonoscopy in 6 weeks.\par

## 2022-03-29 NOTE — DISCUSSION/SUMMARY
[FreeTextEntry1] : Advised stay off aspirin until after colonoscopy.  If stable without further bleeding by then, advised restart aspirin 81 mg QD afterward.  \par Contin other current meds incl reduced freq Losartan.\par Contin to monitor BP at home.\par Stable and optimized cardiac status without contraindications for colonoscopy.\par

## 2022-03-30 ENCOUNTER — NON-APPOINTMENT (OUTPATIENT)
Age: 52
End: 2022-03-30

## 2022-04-11 ENCOUNTER — APPOINTMENT (OUTPATIENT)
Dept: INTERNAL MEDICINE | Facility: CLINIC | Age: 52
End: 2022-04-11
Payer: COMMERCIAL

## 2022-04-11 VITALS
HEART RATE: 69 BPM | TEMPERATURE: 98.3 F | BODY MASS INDEX: 40.16 KG/M2 | WEIGHT: 265 LBS | OXYGEN SATURATION: 97 % | RESPIRATION RATE: 17 BRPM | HEIGHT: 68 IN | DIASTOLIC BLOOD PRESSURE: 83 MMHG | SYSTOLIC BLOOD PRESSURE: 130 MMHG

## 2022-04-11 DIAGNOSIS — B34.9 VIRAL INFECTION, UNSPECIFIED: ICD-10-CM

## 2022-04-11 PROCEDURE — 99495 TRANSJ CARE MGMT MOD F2F 14D: CPT

## 2022-04-12 LAB — SARS-COV-2 N GENE NPH QL NAA+PROBE: NOT DETECTED

## 2022-04-21 DIAGNOSIS — Z11.52 ENCOUNTER FOR SCREENING FOR COVID-19: ICD-10-CM

## 2022-05-05 LAB — SARS-COV-2 N GENE NPH QL NAA+PROBE: NOT DETECTED

## 2022-05-09 ENCOUNTER — APPOINTMENT (OUTPATIENT)
Dept: GASTROENTEROLOGY | Facility: AMBULATORY MEDICAL SERVICES | Age: 52
End: 2022-05-09
Payer: COMMERCIAL

## 2022-05-09 PROCEDURE — 45380 COLONOSCOPY AND BIOPSY: CPT

## 2022-06-05 ENCOUNTER — NON-APPOINTMENT (OUTPATIENT)
Age: 52
End: 2022-06-05

## 2022-06-06 ENCOUNTER — OUTPATIENT (OUTPATIENT)
Dept: OUTPATIENT SERVICES | Facility: HOSPITAL | Age: 52
LOS: 1 days | End: 2022-06-06
Payer: COMMERCIAL

## 2022-06-06 ENCOUNTER — APPOINTMENT (OUTPATIENT)
Dept: ULTRASOUND IMAGING | Facility: CLINIC | Age: 52
End: 2022-06-06

## 2022-06-06 ENCOUNTER — APPOINTMENT (OUTPATIENT)
Dept: ULTRASOUND IMAGING | Facility: HOSPITAL | Age: 52
End: 2022-06-06
Payer: COMMERCIAL

## 2022-06-06 ENCOUNTER — APPOINTMENT (OUTPATIENT)
Dept: GASTROENTEROLOGY | Facility: CLINIC | Age: 52
End: 2022-06-06
Payer: COMMERCIAL

## 2022-06-06 VITALS
BODY MASS INDEX: 39.71 KG/M2 | OXYGEN SATURATION: 97 % | SYSTOLIC BLOOD PRESSURE: 130 MMHG | WEIGHT: 262 LBS | HEART RATE: 69 BPM | HEIGHT: 68 IN | DIASTOLIC BLOOD PRESSURE: 82 MMHG | TEMPERATURE: 97.7 F

## 2022-06-06 DIAGNOSIS — K57.30 DIVERTICULOSIS OF LARGE INTESTINE W/OUT PERFORATION OR ABSCESS W/OUT BLEEDING: ICD-10-CM

## 2022-06-06 DIAGNOSIS — Z98.89 OTHER SPECIFIED POSTPROCEDURAL STATES: Chronic | ICD-10-CM

## 2022-06-06 DIAGNOSIS — M79.89 OTHER SPECIFIED SOFT TISSUE DISORDERS: ICD-10-CM

## 2022-06-06 PROCEDURE — 99214 OFFICE O/P EST MOD 30 MIN: CPT | Mod: 25

## 2022-06-06 PROCEDURE — 93971 EXTREMITY STUDY: CPT

## 2022-06-06 PROCEDURE — 36415 COLL VENOUS BLD VENIPUNCTURE: CPT

## 2022-06-06 PROCEDURE — 93971 EXTREMITY STUDY: CPT | Mod: 26,LT

## 2022-06-06 NOTE — HISTORY OF PRESENT ILLNESS
[FreeTextEntry1] : We reviewed the evaluations done since the patient's last visit on March 28, 2022.  He had a borderline anemia at that time with a hemoglobin and hematocrit of 12.3 and 36.0 respectively.  He was placed on Feosol 325 mg a day which he took until 2 weeks ago.  The patient underwent colonoscopy on May 9, 2022.  The exam was significant for the presence of a terminal ileal ulcer with biopsy showing acute and chronic inflammation.  There was also localized inflammation with aphthous ulcer in the mid sigmoid with biopsy showing acute and chronic inflammation, acute cryptitis, and crypt architectural distortion.  The patient has moderate diverticulosis involving the cecum, ascending, and transverse colon.  He has internal and external hemorrhoids which are asymptomatic.  The patient has had no further bleeding.  He reports 1 bowel movement a day which is solid without melena or bright red blood per rectum.  He denies abdominal pain.  He uses Metamucil as needed.  Of note, the patient does have a nephew with Crohn's disease.\par \par \par Note from 3/28/2022 - The patient is a 52-year-old man seen by me for the first time since 2016.  We spent some time reviewing the patient's history.  The patient has a history of a diverticular bleed in 2019.  The patient was well until 8 days ago when he had 4 large bloody bowel movements.  He went to the [facility] where he had 4-5 more bloody bowel movements.  He was in the hospital for 2 nights and observed.  He was sent home 1 week ago.  After discharge, the patient passed 1 black bowel movement with blood and then had some blood streaking on brown stool for a few days.  He is now back to having a brown bowel movement once a day.  He was fatigued last week but now is feeling better.  He denies heartburn, dysphagia, or abdominal pain.  Hemoglobin and hematocrit were 12.0 and 35.1 respectively on March 21.  Of note, the patient's mother had colon cancer.  His last colonoscopy was performed in the hospital in 2019.  I was unable to find the results.  The patient has had no other hospitalizations in the past year.  He has a history of mild coronary artery disease.

## 2022-06-06 NOTE — CONSULT LETTER
[FreeTextEntry1] : Dear Dr. Ginger Morrison,\par \[facility] I had the pleasure of seeing your patient GLEN CAMACHO in the office today.  My office note is attached. PLEASE READ THE "ASSESSMENT" SECTION OF THE NOTE TO SEE MY IMPRESSION AND PLAN.\par \[facility] Thank you very much for allowing me to participate in the care of your patient.\par \[facility] Sincerely,\[facility] \[facility] Tyler Scott M.D., FAC, [facility] Director, Celiac Program at [facility]  of Medicine\[facility] [employer] [facility] [place] Practice Director,[facility] - Gastroenterology/Internal Medicine at [place]\[address]\[place] [address]\[facility] Tel: [phone]\[facility] Email: [email or web address] \[facility] \[facility] The attached note has been created using a voice recognition system (Dragon).  There may be some misspellings and typos.  Please call my office if you have any issues or questions.

## 2022-06-06 NOTE — ASSESSMENT
[FreeTextEntry1] : Patient status post lower GI bleed that was most likely diverticular in nature.  Colonoscopy did show a terminal ileal ulcer and localized colitis in the mid sigmoid with biopsies both showing inflammation.  It is unclear whether this could represent a more chronic inflammatory disease like Crohn's disease.  The patient is currently asymptomatic.\par \par Blood work was sent for CBC, iron studies, IBD serologies, sed rate, C-reactive protein.\par \par Information was given to the patient regarding diverticulosis and a high-fiber diet.\par \par We will plan on repeating a colonoscopy in 1 year that is May 2023 to assess for chronicity of the terminal ileal ulcer and localized colitis.\par \par Patient will call me if he develops symptoms of diarrhea, abdominal pain, or bleeding.\par \par Patient will return to see me in 1 year or sooner if needed.\par \par \par Plan from 3/28/2022 - Patient status post lower GI bleed which is most likely diverticular in nature.  He has not had a colonoscopy for 3 years.  He has a mother with colon cancer.\par \par Blood work was sent for CBC and iron studies.\par \par A colonoscopy has been scheduled. The risks, benefits, alternatives, and limitations of the procedure, including the possibility of missed lesions, were explained.  The patient will require anesthesia evaluation prior to the procedure given his BMI of 41.05.\par \par Disability forms were completed for the patient.

## 2022-06-07 ENCOUNTER — APPOINTMENT (OUTPATIENT)
Dept: ULTRASOUND IMAGING | Facility: CLINIC | Age: 52
End: 2022-06-07

## 2022-06-09 ENCOUNTER — EMERGENCY (EMERGENCY)
Facility: HOSPITAL | Age: 52
LOS: 1 days | Discharge: HOME | End: 2022-06-09
Attending: EMERGENCY MEDICINE
Payer: COMMERCIAL

## 2022-06-09 ENCOUNTER — NON-APPOINTMENT (OUTPATIENT)
Age: 52
End: 2022-06-09

## 2022-06-09 VITALS
SYSTOLIC BLOOD PRESSURE: 146 MMHG | HEART RATE: 65 BPM | WEIGHT: 259.93 LBS | OXYGEN SATURATION: 96 % | TEMPERATURE: 98 F | RESPIRATION RATE: 19 BRPM | HEIGHT: 68 IN | DIASTOLIC BLOOD PRESSURE: 86 MMHG

## 2022-06-09 VITALS
HEART RATE: 60 BPM | OXYGEN SATURATION: 98 % | RESPIRATION RATE: 16 BRPM | SYSTOLIC BLOOD PRESSURE: 113 MMHG | DIASTOLIC BLOOD PRESSURE: 72 MMHG

## 2022-06-09 DIAGNOSIS — Z98.89 OTHER SPECIFIED POSTPROCEDURAL STATES: Chronic | ICD-10-CM

## 2022-06-09 LAB
ALBUMIN SERPL ELPH-MCNC: 4.5 G/DL — SIGNIFICANT CHANGE UP (ref 3.3–5)
ALP SERPL-CCNC: 62 U/L — SIGNIFICANT CHANGE UP (ref 40–120)
ALT FLD-CCNC: 24 U/L — SIGNIFICANT CHANGE UP (ref 10–45)
ANION GAP SERPL CALC-SCNC: 13 MMOL/L — SIGNIFICANT CHANGE UP (ref 5–17)
AST SERPL-CCNC: 16 U/L — SIGNIFICANT CHANGE UP (ref 10–40)
B BURGDOR C6 AB SER-ACNC: NEGATIVE — SIGNIFICANT CHANGE UP
B BURGDOR IGG+IGM SER-ACNC: 0.13 INDEX — SIGNIFICANT CHANGE UP (ref 0.01–0.89)
BASOPHILS # BLD AUTO: 0.05 K/UL — SIGNIFICANT CHANGE UP (ref 0–0.2)
BASOPHILS NFR BLD AUTO: 0.6 % — SIGNIFICANT CHANGE UP (ref 0–2)
BILIRUB SERPL-MCNC: 0.6 MG/DL — SIGNIFICANT CHANGE UP (ref 0.2–1.2)
BUN SERPL-MCNC: 14 MG/DL — SIGNIFICANT CHANGE UP (ref 7–23)
CALCIUM SERPL-MCNC: 9.5 MG/DL — SIGNIFICANT CHANGE UP (ref 8.4–10.5)
CHLORIDE SERPL-SCNC: 101 MMOL/L — SIGNIFICANT CHANGE UP (ref 96–108)
CK SERPL-CCNC: 86 U/L — SIGNIFICANT CHANGE UP (ref 30–200)
CO2 SERPL-SCNC: 24 MMOL/L — SIGNIFICANT CHANGE UP (ref 22–31)
CREAT SERPL-MCNC: 0.68 MG/DL — SIGNIFICANT CHANGE UP (ref 0.5–1.3)
CRP SERPL-MCNC: 4 MG/L — SIGNIFICANT CHANGE UP (ref 0–4)
EGFR: 110 ML/MIN/1.73M2 — SIGNIFICANT CHANGE UP
EOSINOPHIL # BLD AUTO: 0.28 K/UL — SIGNIFICANT CHANGE UP (ref 0–0.5)
EOSINOPHIL NFR BLD AUTO: 3.2 % — SIGNIFICANT CHANGE UP (ref 0–6)
ERYTHROCYTE [SEDIMENTATION RATE] IN BLOOD: 36 MM/HR — HIGH (ref 0–20)
GLUCOSE SERPL-MCNC: 111 MG/DL — HIGH (ref 70–99)
HCT VFR BLD CALC: 40.1 % — SIGNIFICANT CHANGE UP (ref 39–50)
HGB BLD-MCNC: 13.5 G/DL — SIGNIFICANT CHANGE UP (ref 13–17)
IMM GRANULOCYTES NFR BLD AUTO: 0.5 % — SIGNIFICANT CHANGE UP (ref 0–1.5)
LYMPHOCYTES # BLD AUTO: 2.02 K/UL — SIGNIFICANT CHANGE UP (ref 1–3.3)
LYMPHOCYTES # BLD AUTO: 22.9 % — SIGNIFICANT CHANGE UP (ref 13–44)
MCHC RBC-ENTMCNC: 29.3 PG — SIGNIFICANT CHANGE UP (ref 27–34)
MCHC RBC-ENTMCNC: 33.7 GM/DL — SIGNIFICANT CHANGE UP (ref 32–36)
MCV RBC AUTO: 87.2 FL — SIGNIFICANT CHANGE UP (ref 80–100)
MONOCYTES # BLD AUTO: 0.77 K/UL — SIGNIFICANT CHANGE UP (ref 0–0.9)
MONOCYTES NFR BLD AUTO: 8.7 % — SIGNIFICANT CHANGE UP (ref 2–14)
NEUTROPHILS # BLD AUTO: 5.66 K/UL — SIGNIFICANT CHANGE UP (ref 1.8–7.4)
NEUTROPHILS NFR BLD AUTO: 64.1 % — SIGNIFICANT CHANGE UP (ref 43–77)
NRBC # BLD: 0 /100 WBCS — SIGNIFICANT CHANGE UP (ref 0–0)
PLATELET # BLD AUTO: 297 K/UL — SIGNIFICANT CHANGE UP (ref 150–400)
POTASSIUM SERPL-MCNC: 3.9 MMOL/L — SIGNIFICANT CHANGE UP (ref 3.5–5.3)
POTASSIUM SERPL-SCNC: 3.9 MMOL/L — SIGNIFICANT CHANGE UP (ref 3.5–5.3)
PROT SERPL-MCNC: 7.5 G/DL — SIGNIFICANT CHANGE UP (ref 6–8.3)
RBC # BLD: 4.6 M/UL — SIGNIFICANT CHANGE UP (ref 4.2–5.8)
RBC # FLD: 12.2 % — SIGNIFICANT CHANGE UP (ref 10.3–14.5)
SODIUM SERPL-SCNC: 138 MMOL/L — SIGNIFICANT CHANGE UP (ref 135–145)
WBC # BLD: 8.82 K/UL — SIGNIFICANT CHANGE UP (ref 3.8–10.5)
WBC # FLD AUTO: 8.82 K/UL — SIGNIFICANT CHANGE UP (ref 3.8–10.5)

## 2022-06-09 PROCEDURE — 73562 X-RAY EXAM OF KNEE 3: CPT

## 2022-06-09 PROCEDURE — 93970 EXTREMITY STUDY: CPT | Mod: 26

## 2022-06-09 PROCEDURE — 99284 EMERGENCY DEPT VISIT MOD MDM: CPT | Mod: 25

## 2022-06-09 PROCEDURE — 80053 COMPREHEN METABOLIC PANEL: CPT

## 2022-06-09 PROCEDURE — 85025 COMPLETE CBC W/AUTO DIFF WBC: CPT

## 2022-06-09 PROCEDURE — 73562 X-RAY EXAM OF KNEE 3: CPT | Mod: 26,LT

## 2022-06-09 PROCEDURE — 99285 EMERGENCY DEPT VISIT HI MDM: CPT

## 2022-06-09 PROCEDURE — 86140 C-REACTIVE PROTEIN: CPT

## 2022-06-09 PROCEDURE — 93970 EXTREMITY STUDY: CPT

## 2022-06-09 PROCEDURE — 85652 RBC SED RATE AUTOMATED: CPT

## 2022-06-09 PROCEDURE — 93971 EXTREMITY STUDY: CPT

## 2022-06-09 PROCEDURE — 93971 EXTREMITY STUDY: CPT | Mod: 26,LT

## 2022-06-09 PROCEDURE — 86618 LYME DISEASE ANTIBODY: CPT

## 2022-06-09 PROCEDURE — 82550 ASSAY OF CK (CPK): CPT

## 2022-06-09 RX ORDER — ACETAMINOPHEN 500 MG
650 TABLET ORAL ONCE
Refills: 0 | Status: COMPLETED | OUTPATIENT
Start: 2022-06-09 | End: 2022-06-09

## 2022-06-09 RX ADMIN — Medication 650 MILLIGRAM(S): at 10:43

## 2022-06-09 RX ADMIN — Medication 650 MILLIGRAM(S): at 10:11

## 2022-06-09 NOTE — ED ADULT TRIAGE NOTE - CHIEF COMPLAINT QUOTE
left leg swelling and discomfort  US Monday neg for DVT  c/o worsening swelling and left calf and thigh stiffness

## 2022-06-09 NOTE — ED PROVIDER NOTE - CLINICAL SUMMARY MEDICAL DECISION MAKING FREE TEXT BOX
Attending Guera Rudolph: 51 yo male presenting with left leg pain. on exam ext wwp with strong distal pulses making arterial occlusion less likely. history atypical for claudication. u/s showed no evidence of dvt. or large amount of free fluids. able to ambulate. able to range knee without erythema or ttp on exam making sepitc joint less likely. no h/o STD. pt well appearing. pt is on a statin therefore CK sent and wnl. will likely ace wrap, follow up with orthopedics

## 2022-06-09 NOTE — ED PROVIDER NOTE - PROGRESS NOTE DETAILS
Attending Guera Rudolph: duplex negative for clot. small knee effusion seen on pocus. no warmth or erythema able to range less likely septic joint and crp negative. no sig swelling on exam making occlusive proximal cause less likely. will place in ace wrap, advise elevation, follow up with ortho Pt xray reviewed, no apparent fracture. Discussed with care coordinator Ricci Kothari to help arrange Ortho follow up. Pt placed in Ace Wrap. Pt agreeable to plan for outpt ortho follow up and d/c home   Sophia London PA-C Attending Guera Rudolph: pt scheudled for follow up next week with ortho. well appearing and ambulating without difficulty.

## 2022-06-09 NOTE — ED PROVIDER NOTE - ADDITIONAL NOTES AND INSTRUCTIONS:
Ghulam Marx was seen in [facility] on Thursday 6/9. Please excuse from work Thusday 6/9 through Friday 6/10. He may return to work as of Saturday 6/11.    Thank you for your understanding  Sophia London PA-C

## 2022-06-09 NOTE — ED PROVIDER NOTE - NSICDXPASTMEDICALHX_GEN_ALL_CORE_FT
PAST MEDICAL HISTORY:  Anxiety     CAD (coronary artery disease)     Hypertension     Obesity, morbid, BMI 40.0-49.9     Sleep apnea

## 2022-06-09 NOTE — ED PROVIDER NOTE - OBJECTIVE STATEMENT
Attending Guera Rudolph: 53 yo male h/o diverticulosis with prior GI bleed presenting with atraumatic left leg pain for last couple of days. went to urgent care and had ultrasound performed which was reportedly negative. pt presenting with persistent pain. pain located in left calf, knee and thigh. no falls or trauma. no numbness or tingling. no h/o similar in the past. does report that his knee sometime swell. no fevers or chills. no recent uri. no cough or congestion. no h/o gout. pt able to walk without difficulty. pt denies any sob or chest pain

## 2022-06-09 NOTE — ED PROVIDER NOTE - PHYSICAL EXAMINATION
Attending Guera Rudolph: Gen: NAD, heent: atrauamtic,, mmm, op pink, uvula midline, , cv: rrr, no murmurs, lungs: ctab, abd: soft, nontender, nondistended, no peritoneal signs, , no guarding, ext: wwp, mild left calf ttp, able to range knees, no warmth or erythema to knee, no fluctuence or crepitus. no swelling. strong DP pulses  skin: no rash, neuro: awake and alert, following commands, speech clear, sensation and strength intact, no focal deficits

## 2022-06-09 NOTE — ED PROVIDER NOTE - NS ED ATTENDING STATEMENT MOD
This was a shared visit with the BEAR. I reviewed and verified the documentation and independently performed the documented:

## 2022-06-09 NOTE — ED PROVIDER NOTE - PATIENT PORTAL LINK FT
You can access the FollowMyHealth Patient Portal offered by [facility] by registering at the following website: [email or web address]. By joining [facility] FollowMyHealth portal, you will also be able to view your health information using other applications (apps) compatible with our system.

## 2022-06-09 NOTE — ED ADULT NURSE NOTE - OBJECTIVE STATEMENT
52 y m came to the ed c/o left knee/thigh pain and swelling. states the symptoms started on Saturday. says the pain feels tight. had an outpatient US monday which was negative for DVT. says the area can sometimes appear red although currently no redness noted on exam. patient is a/ox3. denies fevers, chills, chest pain, sob/diff breathing. no decreased ROM in effected extremity. Azithromycin Pregnancy And Lactation Text: This medication is considered safe during pregnancy and is also secreted in breast milk.

## 2022-06-09 NOTE — ED PROVIDER NOTE - ATTENDING APP SHARED VISIT CONTRIBUTION OF CARE
Attending MD Guera Rudolph:   I personally have seen and examined this patient.  Physician assistant note reviewed and agree on plan of care and except where noted.  See HPI, PE, and MDM for details.

## 2022-06-09 NOTE — ED PROVIDER NOTE - NSFOLLOWUPINSTRUCTIONS_ED_ALL_ED_FT
Please return to the emergency department if you have worsening pain, any numbness in your leg, notice any color change to your leg or foot, worsening swelling or further concerns  Please try to keep your leg elevated    Please follow up with the orthopedic doctor in 2-3 days for further evaluation      Acute knee pain is sudden and may be caused by damage, swelling, or irritation of the muscles and tissues that support the knee. Pain may result from:  •A fall.      •An injury to the knee from twisting motions.      •A hit to the knee.      •Infection.      Acute knee pain may go away on its own with time and rest. If it does not, your health care provider may order tests to find the cause of the pain. These may include:  •Imaging tests, such as an X-ray, MRI, CT scan, or ultrasound.      •Joint aspiration. In this test, fluid is removed from the knee and evaluated.      •Arthroscopy. In this test, a lighted tube is inserted into the knee and an image is projected onto a TV screen.      •Biopsy. In this test, a sample of tissue is removed from the body and studied under a microscope.        Follow these instructions at home:      If you have a knee sleeve or brace:      •Wear the knee sleeve or brace as told by your health care provider. Remove it only as told by your health care provider.      •Loosen it if your toes tingle, become numb, or turn cold and blue.      •Keep it clean.    •If the knee sleeve or brace is not waterproof:  •Do not let it get wet.      •Cover it with a watertight covering when you take a bath or shower.        Activity     •Rest your knee.      • Do not do things that cause pain or make pain worse.      •Avoid high-impact activities or exercises, such as running, jumping rope, or doing jumping jacks.      •Work with a physical therapist to make a safe exercise program, as recommended by your health care provider. Do exercises as told by your physical therapist.        Managing pain, stiffness, and swelling    •If directed, put ice on the affected knee. To do this:  •If you have a removable knee sleeve or brace, remove it as told by your health care provider.      •Put ice in a plastic bag.      •Place a towel between your skin and the bag.      •Leave the ice on for 20 minutes, 2–3 times a day.      •Remove the ice if your skin turns bright red. This is very important. If you cannot feel pain, heat, or cold, you have a greater risk of damage to the area.        •If directed, use an elastic bandage to put pressure (compression) on your injured knee. This may control swelling, give support, and help with discomfort.      •Raise (elevate) your knee above the level of your heart while you are sitting or lying down.      •Sleep with a pillow under your knee.      General instructions     •Take over-the-counter and prescription medicines only as told by your health care provider.      • Do not use any products that contain nicotine or tobacco, such as cigarettes, e-cigarettes, and chewing tobacco. If you need help quitting, ask your health care provider.      •If you are overweight, work with your health care provider and a dietitian to set a weight-loss goal that is healthy and reasonable for you. Extra weight can put pressure on your knee.      •Pay attention to any changes in your symptoms.      •Keep all follow-up visits. This is important.        Contact a health care provider if:    •Your knee pain continues, changes, or gets worse.      •You have a fever along with knee pain.      •Your knee feels warm to the touch or is red.      •Your knee kavon or locks up.        Get help right away if:    •Your knee swells, and the swelling becomes worse.      •You cannot move your knee.      •You have severe pain in your knee that cannot be managed with pain medicine.        Summary    •Acute knee pain can be caused by a fall, an injury, an infection, or damage, swelling, or irritation of the tissues that support your knee.      •Your health care provider may perform tests to find out the cause of the pain.      •Pay attention to any changes in your symptoms. Relieve your pain with rest, medicines, light activity, and the use of ice.      •Get help right away if your knee swells, you cannot move your knee, or you have severe pain that cannot be managed with medicine. Please return to the emergency department if you have worsening pain, any numbness in your leg, notice any color change to your leg or foot, worsening swelling or further concerns  Please try to keep your leg elevated    Please follow up with the orthopedic doctor as scheduled in the ED for further management       Acute knee pain is sudden and may be caused by damage, swelling, or irritation of the muscles and tissues that support the knee. Pain may result from:  •A fall.      •An injury to the knee from twisting motions.      •A hit to the knee.      •Infection.      Acute knee pain may go away on its own with time and rest. If it does not, your health care provider may order tests to find the cause of the pain. These may include:  •Imaging tests, such as an X-ray, MRI, CT scan, or ultrasound.      •Joint aspiration. In this test, fluid is removed from the knee and evaluated.      •Arthroscopy. In this test, a lighted tube is inserted into the knee and an image is projected onto a TV screen.      •Biopsy. In this test, a sample of tissue is removed from the body and studied under a microscope.        Follow these instructions at home:      If you have a knee sleeve or brace:      •Wear the knee sleeve or brace as told by your health care provider. Remove it only as told by your health care provider.      •Loosen it if your toes tingle, become numb, or turn cold and blue.      •Keep it clean.    •If the knee sleeve or brace is not waterproof:  •Do not let it get wet.      •Cover it with a watertight covering when you take a bath or shower.        Activity     •Rest your knee.      • Do not do things that cause pain or make pain worse.      •Avoid high-impact activities or exercises, such as running, jumping rope, or doing jumping jacks.      •Work with a physical therapist to make a safe exercise program, as recommended by your health care provider. Do exercises as told by your physical therapist.        Managing pain, stiffness, and swelling    •If directed, put ice on the affected knee. To do this:  •If you have a removable knee sleeve or brace, remove it as told by your health care provider.      •Put ice in a plastic bag.      •Place a towel between your skin and the bag.      •Leave the ice on for 20 minutes, 2–3 times a day.      •Remove the ice if your skin turns bright red. This is very important. If you cannot feel pain, heat, or cold, you have a greater risk of damage to the area.        •If directed, use an elastic bandage to put pressure (compression) on your injured knee. This may control swelling, give support, and help with discomfort.      •Raise (elevate) your knee above the level of your heart while you are sitting or lying down.      •Sleep with a pillow under your knee.      General instructions     •Take over-the-counter and prescription medicines only as told by your health care provider.      • Do not use any products that contain nicotine or tobacco, such as cigarettes, e-cigarettes, and chewing tobacco. If you need help quitting, ask your health care provider.      •If you are overweight, work with your health care provider and a dietitian to set a weight-loss goal that is healthy and reasonable for you. Extra weight can put pressure on your knee.      •Pay attention to any changes in your symptoms.      •Keep all follow-up visits. This is important.        Contact a health care provider if:    •Your knee pain continues, changes, or gets worse.      •You have a fever along with knee pain.      •Your knee feels warm to the touch or is red.      •Your knee kavon or locks up.        Get help right away if:    •Your knee swells, and the swelling becomes worse.      •You cannot move your knee.      •You have severe pain in your knee that cannot be managed with pain medicine.        Summary    •Acute knee pain can be caused by a fall, an injury, an infection, or damage, swelling, or irritation of the tissues that support your knee.      •Your health care provider may perform tests to find out the cause of the pain.      •Pay attention to any changes in your symptoms. Relieve your pain with rest, medicines, light activity, and the use of ice.      •Get help right away if your knee swells, you cannot move your knee, or you have severe pain that cannot be managed with medicine.

## 2022-06-14 ENCOUNTER — NON-APPOINTMENT (OUTPATIENT)
Age: 52
End: 2022-06-14

## 2022-06-14 ENCOUNTER — APPOINTMENT (OUTPATIENT)
Dept: ORTHOPEDIC SURGERY | Facility: CLINIC | Age: 52
End: 2022-06-14

## 2022-06-14 LAB
ANTI-A4 FLA2 IGG ELISA: 17.7 EU/ML
ANTI-CBIR1 ELISA: 10.3 EU/ML
ANTI-FLAX IGG ELISA: 11.4 EU/ML
ANTI-OMPC IGA ELISA: 3.7 EU/ML
ASCA IGA ELISA: 5.3 EU/ML
ASCA IGG ELISA: 83.4 EU/ML
ATG16L1 SNP (RS2241880): NORMAL
BASOPHILS # BLD AUTO: 0.06 K/UL
BASOPHILS NFR BLD AUTO: 0.7 %
CRP PROMTHEUS: 7.5 MG/L
CRP SERPL-MCNC: 5 MG/L
ECM1 SNP (RS3737240): NORMAL
EOSINOPHIL # BLD AUTO: 0.23 K/UL
EOSINOPHIL NFR BLD AUTO: 2.6 %
ERYTHROCYTE [SEDIMENTATION RATE] IN BLOOD BY WESTERGREN METHOD: 32 MM/HR
FERRITIN SERPL-MCNC: 64 NG/ML
HCT VFR BLD CALC: 42.4 %
HGB BLD-MCNC: 14.2 G/DL
IBD AB 7 PNL SER: NORMAL
IBD-SPECIFIC PANCA IFA PATTERN DNASE SENSITIVITY: NOT DETECTED
IBD-SPECIFIC PANCA IFA PERINUCLEAR PATTERN: NOT DETECTED
ICAM-1 PROMETHEUS: 0.67 UG/ML
IMM GRANULOCYTES NFR BLD AUTO: 0.3 %
IRON SATN MFR SERPL: 14 %
IRON SERPL-MCNC: 53 UG/DL
LYMPHOCYTES # BLD AUTO: 1.63 K/UL
LYMPHOCYTES NFR BLD AUTO: 18.4 %
MAN DIFF?: NORMAL
MCHC RBC-ENTMCNC: 29.3 PG
MCHC RBC-ENTMCNC: 33.5 GM/DL
MCV RBC AUTO: 87.6 FL
MONOCYTES # BLD AUTO: 0.7 K/UL
MONOCYTES NFR BLD AUTO: 7.9 %
NEUTROPHILS # BLD AUTO: 6.21 K/UL
NEUTROPHILS NFR BLD AUTO: 70.1 %
NKX2-3 SNP (RS10883365): NORMAL
PLATELET # BLD AUTO: 324 K/UL
PROMETHEUS LABORATORY FOOTER: NORMAL
RBC # BLD: 4.84 M/UL
RBC # FLD: 12.4 %
SAA PROMETHEUS: 4.7 MG/L
STAT3 SNP (RS744166): NORMAL
TIBC SERPL-MCNC: 384 UG/DL
UIBC SERPL-MCNC: 332 UG/DL
VCAM-1 PROMETHEUS: 0.95 UG/ML
VEGF PROMETHEUS: 274 PG/ML
WBC # FLD AUTO: 8.86 K/UL

## 2022-06-14 PROCEDURE — 20610 DRAIN/INJ JOINT/BURSA W/O US: CPT | Mod: LT

## 2022-06-14 PROCEDURE — 99204 OFFICE O/P NEW MOD 45 MIN: CPT | Mod: 25

## 2022-06-15 LAB
B PERT IGG+IGM PNL SER: ABNORMAL
COLOR FLD: NORMAL
EOSINOPHIL # FLD MANUAL: 0 %
FLUID INTAKE SUBSTANCE CLASS: NORMAL
LYMPHOCYTES # FLD MANUAL: 20 %
MESOTHL CELL NFR FLD: 0 %
MONOS+MACROS NFR FLD MANUAL: 64 %
NEUTS SEG # FLD MANUAL: 16 %
NRBC # FLD: 0 %
RBC # FLD MANUAL: ABNORMAL /UL
SYCRY CLARITY: ABNORMAL
SYCRY COLOR: ABNORMAL
SYCRY ID: NORMAL
SYCRY TUBE: NORMAL
TOTAL CELLS COUNTED FLD: 542 /UL
TUBE TYPE: NORMAL
UNIDENT CELLS NFR FLD MANUAL: 0 %
VARIANT LYMPHS # FLD MANUAL: 0 %

## 2022-06-16 ENCOUNTER — RX RENEWAL (OUTPATIENT)
Age: 52
End: 2022-06-16

## 2022-07-05 ENCOUNTER — APPOINTMENT (OUTPATIENT)
Dept: RADIOLOGY | Facility: CLINIC | Age: 52
End: 2022-07-05

## 2022-07-05 ENCOUNTER — APPOINTMENT (OUTPATIENT)
Dept: CT IMAGING | Facility: CLINIC | Age: 52
End: 2022-07-05

## 2022-07-05 ENCOUNTER — RESULT REVIEW (OUTPATIENT)
Age: 52
End: 2022-07-05

## 2022-07-05 ENCOUNTER — OUTPATIENT (OUTPATIENT)
Dept: OUTPATIENT SERVICES | Facility: HOSPITAL | Age: 52
LOS: 1 days | End: 2022-07-05
Payer: COMMERCIAL

## 2022-07-05 DIAGNOSIS — Z98.89 OTHER SPECIFIED POSTPROCEDURAL STATES: Chronic | ICD-10-CM

## 2022-07-05 DIAGNOSIS — Z00.8 ENCOUNTER FOR OTHER GENERAL EXAMINATION: ICD-10-CM

## 2022-07-05 DIAGNOSIS — K63.3 ULCER OF INTESTINE: ICD-10-CM

## 2022-07-05 PROCEDURE — 74018 RADEX ABDOMEN 1 VIEW: CPT | Mod: 26

## 2022-07-05 PROCEDURE — 74018 RADEX ABDOMEN 1 VIEW: CPT

## 2022-07-05 PROCEDURE — 74177 CT ABD & PELVIS W/CONTRAST: CPT | Mod: 26

## 2022-07-05 PROCEDURE — 74177 CT ABD & PELVIS W/CONTRAST: CPT

## 2022-07-06 ENCOUNTER — NON-APPOINTMENT (OUTPATIENT)
Age: 52
End: 2022-07-06

## 2022-07-18 ENCOUNTER — OUTPATIENT (OUTPATIENT)
Dept: OUTPATIENT SERVICES | Facility: HOSPITAL | Age: 52
LOS: 1 days | End: 2022-07-18
Payer: COMMERCIAL

## 2022-07-18 ENCOUNTER — APPOINTMENT (OUTPATIENT)
Dept: MRI IMAGING | Facility: CLINIC | Age: 52
End: 2022-07-18

## 2022-07-18 DIAGNOSIS — Z00.8 ENCOUNTER FOR OTHER GENERAL EXAMINATION: ICD-10-CM

## 2022-07-18 DIAGNOSIS — Z98.89 OTHER SPECIFIED POSTPROCEDURAL STATES: Chronic | ICD-10-CM

## 2022-07-18 PROCEDURE — 73721 MRI JNT OF LWR EXTRE W/O DYE: CPT

## 2022-07-18 PROCEDURE — 73721 MRI JNT OF LWR EXTRE W/O DYE: CPT | Mod: 26,LT

## 2022-07-22 ENCOUNTER — RX RENEWAL (OUTPATIENT)
Age: 52
End: 2022-07-22

## 2022-08-29 ENCOUNTER — RX RENEWAL (OUTPATIENT)
Age: 52
End: 2022-08-29

## 2022-09-06 ENCOUNTER — APPOINTMENT (OUTPATIENT)
Dept: CARDIOLOGY | Facility: CLINIC | Age: 52
End: 2022-09-06

## 2022-09-06 ENCOUNTER — NON-APPOINTMENT (OUTPATIENT)
Age: 52
End: 2022-09-06

## 2022-09-06 VITALS
DIASTOLIC BLOOD PRESSURE: 75 MMHG | OXYGEN SATURATION: 98 % | WEIGHT: 270 LBS | BODY MASS INDEX: 40.92 KG/M2 | HEART RATE: 78 BPM | HEIGHT: 68 IN | SYSTOLIC BLOOD PRESSURE: 128 MMHG

## 2022-09-06 DIAGNOSIS — R07.89 OTHER CHEST PAIN: ICD-10-CM

## 2022-09-06 PROCEDURE — 99214 OFFICE O/P EST MOD 30 MIN: CPT

## 2022-09-06 PROCEDURE — 93000 ELECTROCARDIOGRAM COMPLETE: CPT

## 2022-09-06 NOTE — PHYSICAL EXAM
[Well Developed] : well developed [Well Nourished] : well nourished [No Acute Distress] : no acute distress [Obese] : obese [Normal Conjunctiva] : normal conjunctiva [Normal Venous Pressure] : normal venous pressure [No Carotid Bruit] : no carotid bruit [Normal S1, S2] : normal S1, S2 [No Murmur] : no murmur [No Rub] : no rub [No Gallop] : no gallop [Clear Lung Fields] : clear lung fields [Good Air Entry] : good air entry [No Respiratory Distress] : no respiratory distress  [Soft] : abdomen soft [Non Tender] : non-tender [No Masses/organomegaly] : no masses/organomegaly [Normal Bowel Sounds] : normal bowel sounds [Normal Gait] : normal gait [No Edema] : no edema [No Cyanosis] : no cyanosis [No Clubbing] : no clubbing [No Varicosities] : no varicosities [No Rash] : no rash [No Skin Lesions] : no skin lesions [Moves all extremities] : moves all extremities [No Focal Deficits] : no focal deficits [Normal Speech] : normal speech [Alert and Oriented] : alert and oriented [Normal memory] : normal memory [de-identified] : + left knee swelling and warmth

## 2022-09-06 NOTE — HISTORY OF PRESENT ILLNESS
[FreeTextEntry1] : Here for follow up.\par No further GI bleeding.  Back on low dose aspirin.\par Compl of recurrent sporadic chest heaviness at rest lying down in evening after working 12 hours.\par No exertional chest pain.  No SOB.\par He reports his exercise is limited by left knee pain and swelling for which he had an MRI and is following up with an orthopedist.

## 2022-09-06 NOTE — ASSESSMENT
[FreeTextEntry1] : Chest heaviness is recurrent and sporadic, most likely musculoskeletal or GI in etio.\par

## 2022-09-06 NOTE — DISCUSSION/SUMMARY
[FreeTextEntry1] : Contin current meds.\par Reassurance\par Advised if symptoms progress in pattern would consider stress imaging.\par Follow up 6 mo\par

## 2022-09-16 ENCOUNTER — RX RENEWAL (OUTPATIENT)
Age: 52
End: 2022-09-16

## 2022-09-20 ENCOUNTER — LABORATORY RESULT (OUTPATIENT)
Age: 52
End: 2022-09-20

## 2022-09-20 ENCOUNTER — NON-APPOINTMENT (OUTPATIENT)
Age: 52
End: 2022-09-20

## 2022-09-20 ENCOUNTER — APPOINTMENT (OUTPATIENT)
Dept: ORTHOPEDIC SURGERY | Facility: CLINIC | Age: 52
End: 2022-09-20

## 2022-09-20 VITALS
HEIGHT: 68 IN | BODY MASS INDEX: 39.4 KG/M2 | HEART RATE: 70 BPM | DIASTOLIC BLOOD PRESSURE: 88 MMHG | WEIGHT: 260 LBS | SYSTOLIC BLOOD PRESSURE: 132 MMHG

## 2022-09-20 PROCEDURE — 20610 DRAIN/INJ JOINT/BURSA W/O US: CPT | Mod: LT

## 2022-09-20 PROCEDURE — 99204 OFFICE O/P NEW MOD 45 MIN: CPT | Mod: 25

## 2022-10-06 ENCOUNTER — RX RENEWAL (OUTPATIENT)
Age: 52
End: 2022-10-06

## 2022-10-10 NOTE — ED PROVIDER NOTE - QRS
[FreeTextEntry1] : Impression:\par #Hx of colon polyps - due for CRC screening\par #Hx of VT w/ ICD\par #Hx of CAD\par #Hx of stroke as a procedural complications\par \par Plan:\par - CT colongraphy. Will discuss risks/benefits of colonoscopy based on results. If CT colonography is negative would recommend against further surveillance given significant cardiac history. 
96

## 2022-10-25 ENCOUNTER — APPOINTMENT (OUTPATIENT)
Dept: ORTHOPEDIC SURGERY | Facility: CLINIC | Age: 52
End: 2022-10-25

## 2022-10-25 VITALS — HEART RATE: 68 BPM | SYSTOLIC BLOOD PRESSURE: 122 MMHG | DIASTOLIC BLOOD PRESSURE: 82 MMHG

## 2022-10-25 PROCEDURE — 99213 OFFICE O/P EST LOW 20 MIN: CPT

## 2022-11-09 ENCOUNTER — RX RENEWAL (OUTPATIENT)
Age: 52
End: 2022-11-09

## 2022-11-10 ENCOUNTER — INPATIENT (INPATIENT)
Facility: HOSPITAL | Age: 52
LOS: 3 days | Discharge: HOME | DRG: 378 | End: 2022-11-14
Attending: STUDENT IN AN ORGANIZED HEALTH CARE EDUCATION/TRAINING PROGRAM | Admitting: HOSPITALIST
Payer: COMMERCIAL

## 2022-11-10 VITALS
TEMPERATURE: 98 F | SYSTOLIC BLOOD PRESSURE: 110 MMHG | OXYGEN SATURATION: 97 % | WEIGHT: 265 LBS | RESPIRATION RATE: 18 BRPM | HEIGHT: 68 IN | HEART RATE: 98 BPM | DIASTOLIC BLOOD PRESSURE: 84 MMHG

## 2022-11-10 DIAGNOSIS — K62.5 HEMORRHAGE OF ANUS AND RECTUM: ICD-10-CM

## 2022-11-10 DIAGNOSIS — I25.10 ATHEROSCLEROTIC HEART DISEASE OF NATIVE CORONARY ARTERY WITHOUT ANGINA PECTORIS: ICD-10-CM

## 2022-11-10 DIAGNOSIS — Z98.89 OTHER SPECIFIED POSTPROCEDURAL STATES: Chronic | ICD-10-CM

## 2022-11-10 DIAGNOSIS — Z29.9 ENCOUNTER FOR PROPHYLACTIC MEASURES, UNSPECIFIED: ICD-10-CM

## 2022-11-10 DIAGNOSIS — I10 ESSENTIAL (PRIMARY) HYPERTENSION: ICD-10-CM

## 2022-11-10 DIAGNOSIS — F41.9 ANXIETY DISORDER, UNSPECIFIED: ICD-10-CM

## 2022-11-10 DIAGNOSIS — K92.2 GASTROINTESTINAL HEMORRHAGE, UNSPECIFIED: ICD-10-CM

## 2022-11-10 LAB
ALBUMIN SERPL ELPH-MCNC: 3.7 G/DL — SIGNIFICANT CHANGE UP (ref 3.3–5)
ALBUMIN SERPL ELPH-MCNC: 4.2 G/DL — SIGNIFICANT CHANGE UP (ref 3.3–5)
ALP SERPL-CCNC: 48 U/L — SIGNIFICANT CHANGE UP (ref 40–120)
ALP SERPL-CCNC: 58 U/L — SIGNIFICANT CHANGE UP (ref 40–120)
ALT FLD-CCNC: 26 U/L — SIGNIFICANT CHANGE UP (ref 10–45)
ALT FLD-CCNC: 29 U/L — SIGNIFICANT CHANGE UP (ref 10–45)
ANION GAP SERPL CALC-SCNC: 13 MMOL/L — SIGNIFICANT CHANGE UP (ref 5–17)
ANION GAP SERPL CALC-SCNC: 9 MMOL/L — SIGNIFICANT CHANGE UP (ref 5–17)
APTT BLD: 34 SEC — SIGNIFICANT CHANGE UP (ref 27.5–35.5)
AST SERPL-CCNC: 40 U/L — SIGNIFICANT CHANGE UP (ref 10–40)
AST SERPL-CCNC: 47 U/L — HIGH (ref 10–40)
BASOPHILS # BLD AUTO: 0.05 K/UL — SIGNIFICANT CHANGE UP (ref 0–0.2)
BASOPHILS # BLD AUTO: 0.06 K/UL — SIGNIFICANT CHANGE UP (ref 0–0.2)
BASOPHILS NFR BLD AUTO: 0.5 % — SIGNIFICANT CHANGE UP (ref 0–2)
BASOPHILS NFR BLD AUTO: 0.7 % — SIGNIFICANT CHANGE UP (ref 0–2)
BILIRUB SERPL-MCNC: 0.4 MG/DL — SIGNIFICANT CHANGE UP (ref 0.2–1.2)
BILIRUB SERPL-MCNC: 0.6 MG/DL — SIGNIFICANT CHANGE UP (ref 0.2–1.2)
BLD GP AB SCN SERPL QL: NEGATIVE — SIGNIFICANT CHANGE UP
BUN SERPL-MCNC: 20 MG/DL — SIGNIFICANT CHANGE UP (ref 7–23)
BUN SERPL-MCNC: 22 MG/DL — SIGNIFICANT CHANGE UP (ref 7–23)
CALCIUM SERPL-MCNC: 8.7 MG/DL — SIGNIFICANT CHANGE UP (ref 8.4–10.5)
CALCIUM SERPL-MCNC: 9 MG/DL — SIGNIFICANT CHANGE UP (ref 8.4–10.5)
CHLORIDE SERPL-SCNC: 101 MMOL/L — SIGNIFICANT CHANGE UP (ref 96–108)
CHLORIDE SERPL-SCNC: 104 MMOL/L — SIGNIFICANT CHANGE UP (ref 96–108)
CO2 SERPL-SCNC: 22 MMOL/L — SIGNIFICANT CHANGE UP (ref 22–31)
CO2 SERPL-SCNC: 25 MMOL/L — SIGNIFICANT CHANGE UP (ref 22–31)
CREAT SERPL-MCNC: 0.74 MG/DL — SIGNIFICANT CHANGE UP (ref 0.5–1.3)
CREAT SERPL-MCNC: 0.87 MG/DL — SIGNIFICANT CHANGE UP (ref 0.5–1.3)
EGFR: 103 ML/MIN/1.73M2 — SIGNIFICANT CHANGE UP
EGFR: 108 ML/MIN/1.73M2 — SIGNIFICANT CHANGE UP
EOSINOPHIL # BLD AUTO: 0.15 K/UL — SIGNIFICANT CHANGE UP (ref 0–0.5)
EOSINOPHIL # BLD AUTO: 0.29 K/UL — SIGNIFICANT CHANGE UP (ref 0–0.5)
EOSINOPHIL NFR BLD AUTO: 2 % — SIGNIFICANT CHANGE UP (ref 0–6)
EOSINOPHIL NFR BLD AUTO: 2.5 % — SIGNIFICANT CHANGE UP (ref 0–6)
FLUAV AG NPH QL: SIGNIFICANT CHANGE UP
FLUBV AG NPH QL: SIGNIFICANT CHANGE UP
GLUCOSE SERPL-MCNC: 119 MG/DL — HIGH (ref 70–99)
GLUCOSE SERPL-MCNC: 128 MG/DL — HIGH (ref 70–99)
HCT VFR BLD CALC: 29.8 % — LOW (ref 39–50)
HCT VFR BLD CALC: 30.6 % — LOW (ref 39–50)
HCT VFR BLD CALC: 35.5 % — LOW (ref 39–50)
HGB BLD-MCNC: 10.2 G/DL — LOW (ref 13–17)
HGB BLD-MCNC: 10.6 G/DL — LOW (ref 13–17)
HGB BLD-MCNC: 12.1 G/DL — LOW (ref 13–17)
IMM GRANULOCYTES NFR BLD AUTO: 0.4 % — SIGNIFICANT CHANGE UP (ref 0–0.9)
IMM GRANULOCYTES NFR BLD AUTO: 0.6 % — SIGNIFICANT CHANGE UP (ref 0–0.9)
INR BLD: 1.12 RATIO — SIGNIFICANT CHANGE UP (ref 0.88–1.16)
LACTATE BLDV-MCNC: 1.9 MMOL/L — SIGNIFICANT CHANGE UP (ref 0.5–2)
LYMPHOCYTES # BLD AUTO: 1.42 K/UL — SIGNIFICANT CHANGE UP (ref 1–3.3)
LYMPHOCYTES # BLD AUTO: 19.3 % — SIGNIFICANT CHANGE UP (ref 13–44)
LYMPHOCYTES # BLD AUTO: 19.4 % — SIGNIFICANT CHANGE UP (ref 13–44)
LYMPHOCYTES # BLD AUTO: 2.29 K/UL — SIGNIFICANT CHANGE UP (ref 1–3.3)
MAGNESIUM SERPL-MCNC: 1.8 MG/DL — SIGNIFICANT CHANGE UP (ref 1.6–2.6)
MCHC RBC-ENTMCNC: 30.1 PG — SIGNIFICANT CHANGE UP (ref 27–34)
MCHC RBC-ENTMCNC: 30.3 PG — SIGNIFICANT CHANGE UP (ref 27–34)
MCHC RBC-ENTMCNC: 30.7 PG — SIGNIFICANT CHANGE UP (ref 27–34)
MCHC RBC-ENTMCNC: 34.1 GM/DL — SIGNIFICANT CHANGE UP (ref 32–36)
MCHC RBC-ENTMCNC: 34.2 GM/DL — SIGNIFICANT CHANGE UP (ref 32–36)
MCHC RBC-ENTMCNC: 34.6 GM/DL — SIGNIFICANT CHANGE UP (ref 32–36)
MCV RBC AUTO: 88.3 FL — SIGNIFICANT CHANGE UP (ref 80–100)
MCV RBC AUTO: 88.4 FL — SIGNIFICANT CHANGE UP (ref 80–100)
MCV RBC AUTO: 88.7 FL — SIGNIFICANT CHANGE UP (ref 80–100)
MONOCYTES # BLD AUTO: 0.62 K/UL — SIGNIFICANT CHANGE UP (ref 0–0.9)
MONOCYTES # BLD AUTO: 1.02 K/UL — HIGH (ref 0–0.9)
MONOCYTES NFR BLD AUTO: 8.4 % — SIGNIFICANT CHANGE UP (ref 2–14)
MONOCYTES NFR BLD AUTO: 8.7 % — SIGNIFICANT CHANGE UP (ref 2–14)
NEUTROPHILS # BLD AUTO: 5.07 K/UL — SIGNIFICANT CHANGE UP (ref 1.8–7.4)
NEUTROPHILS # BLD AUTO: 8.06 K/UL — HIGH (ref 1.8–7.4)
NEUTROPHILS NFR BLD AUTO: 68.3 % — SIGNIFICANT CHANGE UP (ref 43–77)
NEUTROPHILS NFR BLD AUTO: 69.2 % — SIGNIFICANT CHANGE UP (ref 43–77)
NRBC # BLD: 0 /100 WBCS — SIGNIFICANT CHANGE UP (ref 0–0)
OB PNL STL: POSITIVE
PLATELET # BLD AUTO: 250 K/UL — SIGNIFICANT CHANGE UP (ref 150–400)
PLATELET # BLD AUTO: 263 K/UL — SIGNIFICANT CHANGE UP (ref 150–400)
PLATELET # BLD AUTO: 359 K/UL — SIGNIFICANT CHANGE UP (ref 150–400)
POTASSIUM SERPL-MCNC: 3.9 MMOL/L — SIGNIFICANT CHANGE UP (ref 3.5–5.3)
POTASSIUM SERPL-MCNC: 4.1 MMOL/L — SIGNIFICANT CHANGE UP (ref 3.5–5.3)
POTASSIUM SERPL-SCNC: 3.9 MMOL/L — SIGNIFICANT CHANGE UP (ref 3.5–5.3)
POTASSIUM SERPL-SCNC: 4.1 MMOL/L — SIGNIFICANT CHANGE UP (ref 3.5–5.3)
PROT SERPL-MCNC: 6.4 G/DL — SIGNIFICANT CHANGE UP (ref 6–8.3)
PROT SERPL-MCNC: 7 G/DL — SIGNIFICANT CHANGE UP (ref 6–8.3)
PROTHROM AB SERPL-ACNC: 13 SEC — SIGNIFICANT CHANGE UP (ref 10.5–13.4)
RBC # BLD: 3.37 M/UL — LOW (ref 4.2–5.8)
RBC # BLD: 3.45 M/UL — LOW (ref 4.2–5.8)
RBC # BLD: 4.02 M/UL — LOW (ref 4.2–5.8)
RBC # FLD: 12.5 % — SIGNIFICANT CHANGE UP (ref 10.3–14.5)
RBC # FLD: 12.7 % — SIGNIFICANT CHANGE UP (ref 10.3–14.5)
RBC # FLD: 12.7 % — SIGNIFICANT CHANGE UP (ref 10.3–14.5)
RH IG SCN BLD-IMP: POSITIVE — SIGNIFICANT CHANGE UP
RSV RNA NPH QL NAA+NON-PROBE: SIGNIFICANT CHANGE UP
SARS-COV-2 RNA SPEC QL NAA+PROBE: SIGNIFICANT CHANGE UP
SODIUM SERPL-SCNC: 136 MMOL/L — SIGNIFICANT CHANGE UP (ref 135–145)
SODIUM SERPL-SCNC: 138 MMOL/L — SIGNIFICANT CHANGE UP (ref 135–145)
UFH PPP CHRO-ACNC: 0 IU/ML — LOW (ref 0.3–0.7)
WBC # BLD: 11.79 K/UL — HIGH (ref 3.8–10.5)
WBC # BLD: 6.81 K/UL — SIGNIFICANT CHANGE UP (ref 3.8–10.5)
WBC # BLD: 7.34 K/UL — SIGNIFICANT CHANGE UP (ref 3.8–10.5)
WBC # FLD AUTO: 11.79 K/UL — HIGH (ref 3.8–10.5)
WBC # FLD AUTO: 6.81 K/UL — SIGNIFICANT CHANGE UP (ref 3.8–10.5)
WBC # FLD AUTO: 7.34 K/UL — SIGNIFICANT CHANGE UP (ref 3.8–10.5)

## 2022-11-10 PROCEDURE — 99223 1ST HOSP IP/OBS HIGH 75: CPT | Mod: GC

## 2022-11-10 PROCEDURE — 93010 ELECTROCARDIOGRAM REPORT: CPT

## 2022-11-10 PROCEDURE — 99222 1ST HOSP IP/OBS MODERATE 55: CPT

## 2022-11-10 PROCEDURE — 74177 CT ABD & PELVIS W/CONTRAST: CPT | Mod: 26,MG

## 2022-11-10 PROCEDURE — G1004: CPT

## 2022-11-10 PROCEDURE — 99285 EMERGENCY DEPT VISIT HI MDM: CPT

## 2022-11-10 RX ORDER — FOLIC ACID 0.8 MG
1 TABLET ORAL ONCE
Refills: 0 | Status: COMPLETED | OUTPATIENT
Start: 2022-11-10 | End: 2022-11-10

## 2022-11-10 RX ORDER — THIAMINE MONONITRATE (VIT B1) 100 MG
100 TABLET ORAL ONCE
Refills: 0 | Status: COMPLETED | OUTPATIENT
Start: 2022-11-10 | End: 2022-11-10

## 2022-11-10 RX ORDER — SODIUM CHLORIDE 9 MG/ML
1000 INJECTION INTRAMUSCULAR; INTRAVENOUS; SUBCUTANEOUS
Refills: 0 | Status: DISCONTINUED | OUTPATIENT
Start: 2022-11-10 | End: 2022-11-12

## 2022-11-10 RX ORDER — SODIUM CHLORIDE 9 MG/ML
1000 INJECTION, SOLUTION INTRAVENOUS
Refills: 0 | Status: DISCONTINUED | OUTPATIENT
Start: 2022-11-10 | End: 2022-11-10

## 2022-11-10 RX ORDER — PANTOPRAZOLE SODIUM 20 MG/1
40 TABLET, DELAYED RELEASE ORAL
Refills: 0 | Status: DISCONTINUED | OUTPATIENT
Start: 2022-11-10 | End: 2022-11-13

## 2022-11-10 RX ORDER — ATORVASTATIN CALCIUM 80 MG/1
1 TABLET, FILM COATED ORAL
Qty: 0 | Refills: 0 | DISCHARGE

## 2022-11-10 RX ADMIN — Medication 1 MILLIGRAM(S): at 07:43

## 2022-11-10 RX ADMIN — Medication 100 MILLIGRAM(S): at 07:43

## 2022-11-10 RX ADMIN — PANTOPRAZOLE SODIUM 40 MILLIGRAM(S): 20 TABLET, DELAYED RELEASE ORAL at 18:05

## 2022-11-10 RX ADMIN — SODIUM CHLORIDE 100 MILLILITER(S): 9 INJECTION INTRAMUSCULAR; INTRAVENOUS; SUBCUTANEOUS at 10:57

## 2022-11-10 NOTE — CONSULT NOTE ADULT - ATTENDING COMMENTS
53 yo M pmh diverticulosis c/b diverticulitis s/p sigmoid resection, h/o diverticular bleeds x 3 with last episode 3/2022, last colon OP in spring/summer 2022, and now representing with brbpr and 2 g drop in hgb.  No bm x 12-18 hours.  Vitals stable.  On asa and now with 1.5 months of taking mobic for knee pain.  Suspect diverticular bleed precipitated by nsaids.  Less likely colonic ulcers/nsaid colopathy.  Plan for supportive care given recent colonoscopy and patient stability.  Stop Mobic, okay with baby aspirin as needed. Patient amenable to plan    GI to follow

## 2022-11-10 NOTE — CONSULT NOTE ADULT - SUBJECTIVE AND OBJECTIVE BOX
Chief Complaint:  Patient is a 52y old  Male who presents with a chief complaint of GI bleed (10 Nov 2022 07:55)      HPI:  Mr. Marx is a 51yo M with PMH of diverticulosis c/b diverticulitis s/p bowel resection, lap band surgery, CAD on aspirin who presents with rectal bleeding x 1 day.    Patient was in his usual state of health until the morning of admission, when - around 2 AM - he woke up with sudden urge to defecate followed by large amount of bright red blood with small amount of stool. No abdominal pain, lightheadedness No nausea or vomiting. He subsequently had 8 more episodes which prompted him to go to the ED. Reports symptoms are similar to his previous bleeding episodes. Patient endorses NSAIDS use for knee pain on a daily basis and daily ASA use.    Patient has previous history of rectal bleeding, attributed to diverticulosis. Last seen by GI 3/22 for similar presentation. He underwent a colonoscopy shortly after with pan diverticulosis.      ED Course: Afebrile; hemodynamically stable and saturating well on room air. No further episodes of rectal bleeding since the AM.     Allergies:  No Known Allergies    Home Medications:  amLODIPine 5 mg oral tablet: 1 tab(s) orally once a day (10 Nov 2022 12:34)  atorvastatin 10 mg oral tablet: 1 tab(s) orally once a day (10 Nov 2022 12:34)  losartan 50 mg oral tablet: 1 tab(s) orally once a day (10 Nov 2022 12:34)  metoprolol succinate 25 mg oral tablet, extended release: 1 tab(s) orally once a day (10 Nov 2022 12:34)  Xanax 0.5 mg oral tablet: 1 tab(s) orally 3 times a day, As Needed (10 Nov 2022 12:34)    Hospital Medications:  pantoprazole  Injectable 40 milliGRAM(s) IV Push two times a day  sodium chloride 0.9%. 1000 milliLiter(s) IV Continuous <Continuous>    PMHX/PSHX:  Hypertension    Sleep apnea    Obesity, morbid, BMI 40.0-49.9    Anxiety    Hypertension    CAD (coronary artery disease)    Internal hemorrhoids    External hemorrhoids    S/P small bowel resection    History of umbilical hernia repair        Family history:  Family history of coronary artery disease    Family history of hypertension    FH: diverticulitis (Father)        Denies family history of colon cancer/polyps, stomach cancer/polyps, pancreatic cancer/masses, liver cancer/disease, ovarian cancer and endometrial cancer.    Social History:     Tob: Denies  EtOH: As above  Illicit Drugs: Denies    ROS:   General:  No wt loss, fevers, chills, night sweats, fatigue  Eyes:  Good vision, no reported pain  ENT:  No sore throat, pain, runny nose, dysphagia  CV:  No pain, palpitations, hypo/hypertension  Pulm:  No dyspnea, cough, tachypnea, wheezing  GI:  As per HPI  :  No pain, bleeding, incontinence, nocturia  Muscle:  No pain, weakness  Neuro:  No weakness, tingling, memory problems  Psych:  No fatigue, insomnia, mood problems, depression  Endocrine:  No polyuria, polydipsia, cold/heat intolerance  Heme:  No petechiae, ecchymosis, easy bruisability  Skin:  No rash, tattoos, scars, edema    PHYSICAL EXAM:   GENERAL:  No acute distress  HEENT:  Normocephalic/atraumatic, no scleral icterus  CHEST:  No accessory muscle use  HEART:  Regular rate and rhythm  ABDOMEN:  Soft, non-tender, non-distended  EXTREMITIES: No cyanosis, clubbing, or edema  SKIN:  No rash  NEURO:  Alert and oriented x 3, no asterixis    Vital Signs:  Vital Signs Last 24 Hrs  T(C): 37.2 (10 Nov 2022 16:13), Max: 37.2 (10 Nov 2022 16:13)  T(F): 98.9 (10 Nov 2022 16:13), Max: 98.9 (10 Nov 2022 16:13)  HR: 73 (10 Nov 2022 16:13) (62 - 98)  BP: 116/78 (10 Nov 2022 16:13) (108/80 - 135/88)  BP(mean): --  RR: 16 (10 Nov 2022 16:13) (16 - 19)  SpO2: 97% (10 Nov 2022 16:13) (97% - 100%)    Parameters below as of 10 Nov 2022 16:13  Patient On (Oxygen Delivery Method): room air      Daily Height in cm: 172.72 (10 Nov 2022 02:34)    Daily     LABS:                        10.2   6.81  )-----------( 250      ( 10 Nov 2022 16:18 )             29.8     Mean Cell Volume: 88.4 fl (11-10-22 @ 16:18)    11-10    138  |  104  |  20  ----------------------------<  119<H>  4.1   |  25  |  0.74    Ca    8.7      10 Nov 2022 11:55  Mg     1.8     11-10    TPro  6.4  /  Alb  3.7  /  TBili  0.6  /  DBili  x   /  AST  40  /  ALT  26  /  AlkPhos  48  11-10    LIVER FUNCTIONS - ( 10 Nov 2022 11:55 )  Alb: 3.7 g/dL / Pro: 6.4 g/dL / ALK PHOS: 48 U/L / ALT: 26 U/L / AST: 40 U/L / GGT: x           PT/INR - ( 10 Nov 2022 03:16 )   PT: 13.0 sec;   INR: 1.12 ratio         PTT - ( 10 Nov 2022 03:16 )  PTT:34.0 sec                            10.2   6.81  )-----------( 250      ( 10 Nov 2022 16:18 )             29.8                         10.6   7.34  )-----------( 263      ( 10 Nov 2022 11:55 )             30.6                         12.1   11.79 )-----------( 359      ( 10 Nov 2022 03:16 )             35.5       Imaging:  < from: CT Abdomen and Pelvis w/ IV Cont (11.10.22 @ 05:40) >    ACC: [id number] EXAM:  CT ABDOMEN AND PELVIS IC                          PROCEDURE DATE:  11/10/2022          INTERPRETATION:  CLINICAL INFORMATION: Gastrointestinal hemorrhage.   History of diverticulitis status post low anterior resection. CAD on   aspirin.    COMPARISON: CT enterography dated 7/5/2022.    CONTRAST/COMPLICATIONS:  IV Contrast: Omnipaque 350  90 cc administered   10 cc discarded  Oral Contrast: NONE  Complications: None reported at time of study completion    PROCEDURE:  CT of the Abdomen and Pelvis was performed.  Precontrast, Arterial and Delayed phases were performed.  Sagittal and coronal reformats were performed.    FINDINGS:  LOWER CHEST: Within normal limits.    LIVER: Steatosis.  BILE DUCTS: Normal caliber.  GALLBLADDER: Normal appearing fluid-filled gallbladder. No dilation nor   gallbladder wall thickening.  SPLEEN: Punctate calcifications.  PANCREAS: Mild fatty replacement.  ADRENALS: Within normal limits.  KIDNEYS/URETERS: Symmetric enhancement bilaterally. No hydronephrosis nor   renal mass    BLADDER: Nondistended fluid-filled bladder.  REPRODUCTIVE ORGANS: Prostate is within normal limits    BOWEL: Status post lap band procedure with phi angle of less than 45   degrees. Status post low anterior resection with rectosigmoid   anastomosis. Nondistended rectum and sigmoid colon with high attenuation   internal material. No extravasation of contrast into the bowel lumen.  Normal appendix.  PERITONEUM: No ascites.  No free air or abscess.  VESSELS: Atherosclerotic changes. Small area of aneurysmal bulging within   the infrarenal abdominal aorta coronal 9-55. Maximum cross-sectional   diameter 1.9 x 2.2 cm. No acute dissection, leak or rupture.  RETROPERITONEUM/LYMPH NODES: No lymphadenopathy.  ABDOMINAL WALL: Postsurgical changes anterior abdominal wall consistent   with abdominal wall reconstruction.  BONES: Within normal limits.    IMPRESSION:  Nondilated rectum containing high attenuation fluid which may represent   blood.    No active extravasation of contrast into the bowel lumen to suggest   source of GI bleed at CT. If further evaluation is required correlation   with nuclear medicine GI bleeding scan could be performed.    Please refer to detailed findings otherwise described above.    --- End of Report ---

## 2022-11-10 NOTE — H&P ADULT - NSHPSOCIALHISTORY_GEN_ALL_CORE
Pre-employment physical exam. See scanned documents    Patient's body mass index is 35.59 kg/m². Exercise and nutrition counseling were performed at this visit.     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

## 2022-11-10 NOTE — H&P ADULT - ASSESSMENT
Infusion Nursing Note    Ranjeet Marie presents to the [facility] today for: PRBC's    Due to : Beta thalassemia (H)    Intravenous Access/Labs: PIV was accessed in 2 attempts, labs were obtained and sent to lab as ordered.     Coping:   Child Family Life: declined    Infusion Note: Patient denies any fevers and/or infections. Patients Hgb was 7.6, which per parameters required blood transfusion. No pre-medications required prior to the start of the infusion. Patient received 2 units of PRBC's without issue. Vital signs remained stable throughout. PIV removed without issue, catheter intact. Patient seen and assessed by Danette Malave NP while in clinic.      Discharge Plan:   Patient verbalized understanding of discharge instructions. Confirmed with Danette Malave NP that a request would be sent to family requesting to schedule future appointments. Patient left [facility] with aunt in stable condition.       ·  Plan:      Problem/Plan - 2:  ·  Problem: Acute blood loss anemia.   ·  Plan: Patient with acute drop in Hgb 2/2 GI bleed  - see plan as above  - trend Hgb, maintain active T&S.     Problem/Plan - 3:  ·  Problem: Hypertension.   ·  Plan: - hold home losartan 50mg BID, amlodipine 5mg qd, metoprolol 25mg qd in the setting of GI bleed  - watch pressures closely.     Problem/Plan - 4:  ·  Problem: CAD (coronary artery disease).   ·  Plan: CAD on aspirin  - hold aspirin in the setting of GI bleed.     Problem/Plan - 5:  ·  Problem: Preventive measure.   ·  Plan: - DVT ppx: SCD  - Diet: NPO for now  - Dispo: pending clinical improvement. 53 y/o male with PMH of diverticular bleeding, int and ext hemorrhoids, colitis       Problem/Plan - 2:  ·  Problem: Acute blood loss anemia.   ·  Plan: Patient with acute drop in Hgb 2/2 GI bleed  - see plan as above  - trend Hgb, maintain active T&S.     Problem/Plan - 3:  ·  Problem: Hypertension.   ·  Plan: - hold home losartan 50mg BID, amlodipine 5mg qd, metoprolol 25mg qd in the setting of GI bleed  - watch pressures closely.     Problem/Plan - 4:  ·  Problem: CAD (coronary artery disease).   ·  on ASA 81 mg  - hold ASA in context of GI bleed     Problem/Plan - 5:  ·  Problem: Preventive measure.   ·  Plan: - DVT ppx: SCD  - Diet: NPO for now  - Dispo: pending clinical improvement. 51 y/o male with PMH of diverticular bleeding, int and ext hemorrhoids, colitis     52 year-old male PMH diverticulosis with bleeding x2, s/p bowel resection for diverticulitis, lap band procedure, ext and int hemorrhoids, colitis, small bowel ulcer, lap band procedure, CAD on ASA 81 qd, presenting acutely with BRBPR GI bleeding c/f lower GI bleed 52 year-old male PMH diverticulosis with bleeding x2, s/p bowel resection for diverticulitis, lap band procedure, ext and int hemorrhoids, colitis, small bowel ulcer, lap band procedure, CAD on ASA 81 qd, presenting acutely with BRBPR GI bleeding c/f lower GI bleed vs brisk upper GI bleed.

## 2022-11-10 NOTE — H&P ADULT - PROBLEM SELECTOR PLAN 4
Per outpatient records, pt. takes Alprazolam .5 mg as needed, currently pt. states he is not taking it

## 2022-11-10 NOTE — ED PROVIDER NOTE - OBJECTIVE STATEMENT
51 y/o male w/ PMH diverticulosis, CAD, HTN c/o 1 hour history of dark red stool. Pt also admits to 1 hour history of localized LLQ mild abdominal pain. Admits to generalized body weakness. Pt had episodes of spontaneous diverticular bleeding in the past. Denies fevers, chills, nausea, vomiting, dizziness, chest pain, SOB, dysuria, hematuria, hematochezia. 53 y/o male w/ PMH diverticulosis, CAD, HTN, s/p bowel resection, lap band surgery, CAD on aspirin, c/o 1 hour history of dark red stool. Pt also admits to 1 hour history of localized LLQ mild abdominal pain. Admits to generalized body weakness. Pt had episodes of spontaneous diverticular bleeding in the past. Denies fevers, chills, nausea, vomiting, dizziness, chest pain, SOB, dysuria, hematuria, hematochezia.

## 2022-11-10 NOTE — ED ADULT NURSE REASSESSMENT NOTE - NS ED NURSE REASSESS COMMENT FT1
pts 2 meds were not in the room per previous break nurse, oncoming nurse made aware and stated she will call pharmacy, pt stable including vitals

## 2022-11-10 NOTE — CONSULT NOTE ADULT - ASSESSMENT
52M hx lap band placement 2014 at OSH, multiple episodes of diverticulosis c/b bleed, remote hx of laparoscopic colon resection. Bariatric surgery consulted for history of lap band.     - CTAP ordered, not yet performed  - Recommend GI evaluation for recurrent diverticular bleed   - Full recs to follow    To be discussed with attending when work up is complete.     [facility]  [id number] 52M hx lap band placement 2014 at OSH, multiple episodes of diverticulosis c/b bleed, remote hx of laparoscopic colon resection. Bariatric surgery consulted for history of lap band.     RECOMMENDATIONS:   - CTAP ordered, not yet performed  - Recommend GI evaluation for recurrent diverticular bleed   - Full recs to follow    To be discussed with attending when work up is complete.     [facility]  [id number] General Sunscreen Counseling: I recommended a broad spectrum sunscreen with a SPF of 30 or higher.  I explained that SPF 30 sunscreens block approximately 97 percent of the sun's harmful rays.  Sunscreens should be applied at least 15 minutes prior to expected sun exposure and then every 2 hours after that as long as sun exposure continues. If swimming or exercising sunscreen should be reapplied every 45 minutes to an hour after getting wet or sweating.  One ounce, or the equivalent of a shot glass full of sunscreen, is adequate to protect the skin not covered by a bathing suit. I also recommended a lip balm with a sunscreen as well. Sun protective clothing can be used in lieu of sunscreen but must be worn the entire time you are exposed to the sun's rays. 52M hx lap band placement 2014 at OSH, multiple episodes of diverticulosis c/b bleed, remote hx of laparoscopic colon resection, presenting with dark red stools and LLQ pain. Bariatric surgery consulted for history of lap band.     RECOMMENDATIONS:   - CTAP ordered, not yet performed  - Recommend GI evaluation for recurrent diverticular bleed   - Full recs to follow    To be discussed with attending when work up is complete.     [facility]  [id number] 52M hx lap band placement 2014 at OSH, multiple episodes of diverticular bleed, remote hx of laparoscopic LAR for diverticulitis, presenting with dark red stools and lower abdominal discomfort. Bariatric surgery consulted for history of lap band.     RECOMMENDATIONS:   - CTAP ordered, not yet performed  - Recommend GI evaluation for recurrent diverticular bleed   - Full recs to follow    To be staffed when work up is complete.     [facility]  [id number] Detail Level: Detailed Products Recommended: Elta MD\\nCeraVe 52M hx lap band placement 2014 at OSH, multiple episodes of diverticular bleed, remote hx of laparoscopic LAR for diverticulitis, presenting with dark red stools and lower abdominal discomfort. Bariatric surgery consulted for history of lap band.     RECOMMENDATIONS:   - CTAP ordered, not yet performed  - Recommend GI evaluation for recurrent diverticular bleed   - Full recs to follow    To be staffed when work up is complete.     [facility]  [id number]    ____  ADDENDUM 10:30AM:  - Gastric band not related to current BRBPR. Rest of care/workup per primary team.  - Will sign off    D/w Dr. Maria Teresa Rose Team Surgery [id number]

## 2022-11-10 NOTE — H&P ADULT - NSHPPHYSICALEXAM_GEN_ALL_CORE
Vital Signs Last 24 Hrs  T(C): 37 (10 Nov 2022 07:39), Max: 37.1 (10 Nov 2022 07:20)  T(F): 98.6 (10 Nov 2022 07:39), Max: 98.7 (10 Nov 2022 07:20)  HR: 86 (10 Nov 2022 07:39) (62 - 98)  BP: 116/92 (10 Nov 2022 07:39) (108/80 - 116/92)  BP(mean): --  RR: 19 (10 Nov 2022 07:39) (18 - 19)  SpO2: 98% (10 Nov 2022 07:39) (97% - 98%)    Parameters below as of 10 Nov 2022 07:39  Patient On (Oxygen Delivery Method): room air    PHYSICAL EXAM:  GENERAL: NAD, lying in bed comfortably  HEAD: Atraumatic, normocephalic appearing  EYES: EOMI, PERRLA, conjunctiva and sclera clear  ENT: Moist mucous membranes  NECK: Supple, No JVD; no palpable pre-auricular, post-auricular, occipital, mandibular, submental, supra-clavicular, or infra-clavicular lymph nodes   CHEST/LUNG: Clear to auscultation bilaterally; No rales, rhonchi, wheezing, or rubs. Unlabored respirations  HEART: Regular rate and rhythm; No murmurs, rubs, or gallops  ABDOMEN: Bowel sounds present; Soft, nontender to light and deep palpation, nondistended  EXTREMITIES:  2+ Peripheral radial pulses; brisk capillary refill. No clubbing, cyanosis, or bilateral LE edema  NERVOUS SYSTEM:  Alert & Oriented to situation, speech clear. No gross motor sensory deficits   MSK: FROM all 4 extremities, full and equal strength  PSYCH: Appropriate affect  SKIN: No obvious rashes or lesions Vital Signs Last 24 Hrs  T(C): 37 (10 Nov 2022 07:39), Max: 37.1 (10 Nov 2022 07:20)  T(F): 98.6 (10 Nov 2022 07:39), Max: 98.7 (10 Nov 2022 07:20)  HR: 86 (10 Nov 2022 07:39) (62 - 98)  BP: 116/92 (10 Nov 2022 07:39) (108/80 - 116/92)  BP(mean): --  RR: 19 (10 Nov 2022 07:39) (18 - 19)  SpO2: 98% (10 Nov 2022 07:39) (97% - 98%)    Parameters below as of 10 Nov 2022 07:39  Patient On (Oxygen Delivery Method): room air    PHYSICAL EXAM:  GENERAL: NAD, lying in bed comfortably  HEAD: Atraumatic, normocephalic appearing  EYES: EOMI, PERRLA, conjunctiva and sclera clear  ENT: Moist mucous membranes  NECK: Supple, No JVD; no palpable pre-auricular, post-auricular, occipital, mandibular, submental, supra-clavicular, or infra-clavicular lymph nodes   CHEST/LUNG: Clear to auscultation bilaterally; No rales, rhonchi, wheezing, or rubs. Unlabored respirations  HEART: Regular rate and rhythm; No murmurs, rubs, or gallops  ABDOMEN: Bowel sounds present; Soft, nontender to light and deep palpation, nondistended, obese habitus  EXTREMITIES:  2+ Peripheral radial pulses; brisk capillary refill. No clubbing, cyanosis, or bilateral LE edema  NERVOUS SYSTEM:  Alert & Oriented to situation, speech clear. No gross motor sensory deficits   MSK: FROM all 4 extremities, full and equal strength  PSYCH: Appropriate affect  SKIN: No obvious rashes or lesions Vital Signs Last 24 Hrs  T(C): 37 (10 Nov 2022 07:39), Max: 37.1 (10 Nov 2022 07:20)  T(F): 98.6 (10 Nov 2022 07:39), Max: 98.7 (10 Nov 2022 07:20)  HR: 86 (10 Nov 2022 07:39) (62 - 98)  BP: 116/92 (10 Nov 2022 07:39) (108/80 - 116/92)  BP(mean): --  RR: 19 (10 Nov 2022 07:39) (18 - 19)  SpO2: 98% (10 Nov 2022 07:39) (97% - 98%)    Parameters below as of 10 Nov 2022 07:39  Patient On (Oxygen Delivery Method): room air    PHYSICAL EXAM:  GENERAL: NAD; lying in bed comfortably; morbidly obese habitus  HEAD: Atraumatic, normocephalic appearing  EYES: Sclera clear  ENT: Moist mucous membranes  NECK: Supple; large neck circumference; no palpable pre-auricular, post-auricular, occipital, mandibular, submental, supra-clavicular, or infra-clavicular lymph nodes   CHEST/LUNG: Clear to auscultation bilaterally; No rales, rhonchi, wheezing, or rubs. Unlabored respirations  HEART: Regular rate and rhythm; No murmurs, rubs, or gallops  ABDOMEN: Soft, nontender to light and deep palpation, nondistended, obese habitus  EXTREMITIES:  2+ Peripheral radial pulses; no bilateral LE edema  NERVOUS SYSTEM: Alert & Oriented to situation, speech clear. No gross motor sensory deficits   MSK: Moves all 4 extremities  PSYCH: Appropriate affect  SKIN: No obvious rashes

## 2022-11-10 NOTE — CONSULT NOTE ADULT - ASSESSMENT
53yo M with PMH of diverticulosis c/b diverticulitis s/p bowel resection, lap band surgery, CAD on aspirin who presents with rectal bleeding x 1 day.    # Rectal bleeding - in the setting of a recent colonoscopy with pan-diverticulosis. Suspect diverticulosis as the likely etiology. Low suspicion for malignancy given recent negative colonoscopy. Diverticular bleeds tend to self resolve without intervention and the role for a colonoscopy is usually a diagnostic one. Given that patient's bleeding seemed to have resolved/slowed down significantly, and given the recent reportedly negative colonoscopy (except reported diverticulosis) would defer from repeat colonoscopy at this time. Patient voiced preference to avoid a repeat colonoscopy as well.    Recommendations:  - Monitor for recurrence of bleeding  - Trend CBC, transfuse for Hb>7, PLT>50  - No plans for colonoscopy at this time  - If bleeding does not resolve overnight will re-evaluate for a procedure  - Clear liquid diet for now  - GI will follow    Please note that the recommendations are not final until attested by an attending.    Thank you for involving us in the care of this patient. Please reach out if any further questions.    Eddie Garrison, PGY-6  Gastroenterology/Hepatology Fellow    Available on [employer] Teams  After 5PM/Weekends, please contact the on-call GI fellow: [phone]

## 2022-11-10 NOTE — H&P ADULT - NSICDXPASTMEDICALHX_GEN_ALL_CORE_FT
PAST MEDICAL HISTORY:  Anxiety     CAD (coronary artery disease)     Hypertension     Obesity, morbid, BMI 40.0-49.9     Sleep apnea      PAST MEDICAL HISTORY:  Anxiety     CAD (coronary artery disease)     External hemorrhoids     Hypertension     Internal hemorrhoids     Obesity, morbid, BMI 40.0-49.9     Sleep apnea

## 2022-11-10 NOTE — H&P ADULT - NSHPREVIEWOFSYSTEMS_GEN_ALL_CORE
53 y/o male w/ PMH diverticulosis, CAD, HTN, s/p LAR at OSH (~15 years ago for diverticulitis), lap band surgery (2014), CAD on aspirin, presenting with GIB. Pt. reports 6-7 episodes of blood from below filling toilet bowl in past 24 hours.  1 hour history of dark red stool. Pt also admits to 1 hour history of localized LLQ mild abdominal pain. Admits to generalized body weakness. Pt had episodes of spontaneous diverticular bleeding in the past. No nausea/vomiting. Of note, pt. had multiple episodes of spontaneous diverticular bleeding in the past, last colonoscopy in 2019 consistent with diverticulosis. First episode of GIB since starting aspirin.     Bariatric surgery consulted for history of lap band placed in September 2014. Pt had lost 50 lbs after band placement, then gained 30lbs back. Band partially deflated in past due to nausea. Patient seen in ED, reporting mild lower abdominal discomfort. Pt. with leukocytosis 11.79, AVSS. REVIEW OF SYSTEMS: As indicated above; otherwise negative    CONSTITUTIONAL: No weakness, fevers or chills  EYES/ENT: No visual changes; no vertigo or throat pain   NECK: No pain or stiffness  RESPIRATORY: No cough, wheezing, hemoptysis; No shortness of breath  CARDIOVASCULAR: No chest pain or palpitations  GASTROINTESTINAL: No abdominal or epigastric pain. No nausea, vomiting, or hematemesis; no diarrhea or constipation; no melena or hematochezia.  GENITOURINARY: No dysuria, frequency or hematuria  NEUROLOGICAL: No numbness or weakness  SKIN: No itching, rashes REVIEW OF SYSTEMS: As indicated above; otherwise negative    CONSTITUTIONAL: No fevers or chills; but has been feeling tired/fatigued  EYES/ENT: No visual changes  NECK: No reported pain  RESPIRATORY: No shortness of breath  CARDIOVASCULAR: No chest pain or palpitations  GASTROINTESTINAL: Has had 10 episodes of bright red to darkish red bloody stools from 2am to 7am; however, no abdominal pain. No nausea and/or vomiting  GENITOURINARY: No dysuria or hematuria  NEUROLOGICAL: No rerpoted numbness or weakness  SKIN: No reported rashes

## 2022-11-10 NOTE — CONSULT NOTE ADULT - SUBJECTIVE AND OBJECTIVE BOX
HPI:        PAST MEDICAL & SURGICAL HISTORY:  Sleep apnea      Obesity, morbid, BMI 40.0-49.9      Anxiety      Hypertension      CAD (coronary artery disease)      S/P small bowel resection      History of umbilical hernia repair          MEDICATIONS  (STANDING):  folic acid Injectable 1 milliGRAM(s) IV Push Once  thiamine 100 milliGRAM(s) Oral Once    MEDICATIONS  (PRN):      Allergies    No Known Allergies    Intolerances        SOCIAL HISTORY:    FAMILY HISTORY:  FH: diverticulitis (Father)            Physical Exam:  General: NAD, resting comfortably  HEENT: NC/AT, EOMI, normal hearing, no oral lesions, no LAD, neck supple  Pulmonary: normal resp effort, CTA-B  Cardiovascular: NSR, no murmurs  Abdominal: soft, ND/NT, no organomegaly  Extremities: WWP, normal strength, no clubbing/cyanosis/edema  Neuro: A/O x 3, CNs II-XII grossly intact, normal sensation, no focal deficits  Pulses: palpable distal pulses    Vital Signs Last 24 Hrs  T(C): 36.9 (10 Nov 2022 02:34), Max: 36.9 (10 Nov 2022 02:34)  T(F): 98.4 (10 Nov 2022 02:34), Max: 98.4 (10 Nov 2022 02:34)  HR: 98 (10 Nov 2022 02:34) (98 - 98)  BP: 110/84 (10 Nov 2022 02:34) (110/84 - 110/84)  BP(mean): --  RR: 18 (10 Nov 2022 02:34) (18 - 18)  SpO2: 97% (10 Nov 2022 02:34) (97% - 97%)    Parameters below as of 10 Nov 2022 02:34  Patient On (Oxygen Delivery Method): room air        I&O's Summary          LABS:                        12.1   11.79 )-----------( 359      ( 10 Nov 2022 03:16 )             35.5     11-10    136  |  101  |  22  ----------------------------<  128<H>  3.9   |  22  |  0.87    Ca    9.0      10 Nov 2022 03:16    TPro  7.0  /  Alb  4.2  /  TBili  0.4  /  DBili  x   /  AST  47<H>  /  ALT  29  /  AlkPhos  58  11-10    PT/INR - ( 10 Nov 2022 03:16 )   PT: 13.0 sec;   INR: 1.12 ratio         PTT - ( 10 Nov 2022 03:16 )  PTT:34.0 sec    CAPILLARY BLOOD GLUCOSE        LIVER FUNCTIONS - ( 10 Nov 2022 03:16 )  Alb: 4.2 g/dL / Pro: 7.0 g/dL / ALK PHOS: 58 U/L / ALT: 29 U/L / AST: 47 U/L / GGT: x             Cultures:      RADIOLOGY & ADDITIONAL STUDIES:             HPI:  51 y/o male w/ PMH diverticulosis, CAD, HTN, s/p bowel resection, lap band surgery, CAD on aspirin, c/o 1 hour history of dark red stool. Pt also admits to 1 hour history of localized LLQ mild abdominal pain. Admits to generalized body weakness. Pt had episodes of spontaneous diverticular bleeding in the past. Denies fevers, chills, nausea, vomiting, dizziness, chest pain, SOB, dysuria, hematuria, hematochezia.    Bariatric surgery consulted for history of lap band placed in September 2014.       PAST MEDICAL & SURGICAL HISTORY:  Sleep apnea      Obesity, morbid, BMI 40.0-49.9      Anxiety      Hypertension      CAD (coronary artery disease)      S/P small bowel resection      History of umbilical hernia repair          MEDICATIONS  (STANDING):  folic acid Injectable 1 milliGRAM(s) IV Push Once  thiamine 100 milliGRAM(s) Oral Once    MEDICATIONS  (PRN):      Allergies    No Known Allergies    Intolerances        SOCIAL HISTORY:    FAMILY HISTORY:  FH: diverticulitis (Father)            Physical Exam:  General: NAD, resting comfortably  HEENT: NC/AT, EOMI, normal hearing, no oral lesions, no LAD, neck supple  Pulmonary: normal resp effort, CTA-B  Cardiovascular: RRR, no murmurs  Abdominal: soft, ND/NT, no organomegaly  Extremities: WWP, normal strength, no clubbing/cyanosis/edema  Neuro: A/O x 3, CNs II-XII grossly intact, normal sensation, no focal deficits    Vital Signs Last 24 Hrs  T(C): 36.9 (10 Nov 2022 02:34), Max: 36.9 (10 Nov 2022 02:34)  T(F): 98.4 (10 Nov 2022 02:34), Max: 98.4 (10 Nov 2022 02:34)  HR: 98 (10 Nov 2022 02:34) (98 - 98)  BP: 110/84 (10 Nov 2022 02:34) (110/84 - 110/84)  BP(mean): --  RR: 18 (10 Nov 2022 02:34) (18 - 18)  SpO2: 97% (10 Nov 2022 02:34) (97% - 97%)    Parameters below as of 10 Nov 2022 02:34  Patient On (Oxygen Delivery Method): room air        I&O's Summary          LABS:                        12.1   11.79 )-----------( 359      ( 10 Nov 2022 03:16 )             35.5     11-10    136  |  101  |  22  ----------------------------<  128<H>  3.9   |  22  |  0.87    Ca    9.0      10 Nov 2022 03:16    TPro  7.0  /  Alb  4.2  /  TBili  0.4  /  DBili  x   /  AST  47<H>  /  ALT  29  /  AlkPhos  58  11-10    PT/INR - ( 10 Nov 2022 03:16 )   PT: 13.0 sec;   INR: 1.12 ratio         PTT - ( 10 Nov 2022 03:16 )  PTT:34.0 sec    CAPILLARY BLOOD GLUCOSE        LIVER FUNCTIONS - ( 10 Nov 2022 03:16 )  Alb: 4.2 g/dL / Pro: 7.0 g/dL / ALK PHOS: 58 U/L / ALT: 29 U/L / AST: 47 U/L / GGT: x             Cultures:      RADIOLOGY & ADDITIONAL STUDIES:             HPI:  51 y/o male w/ PMH diverticulosis, CAD, HTN, s/p LAR at OSH (~15 years ago for diverticulitis), lap band surgery (2014), CAD on aspirin, presenting with GIB. Pt. reports 6-7 episodes of blood from below filling toilet bowl in past 24 hours.  No nausea/vomiting. Of note, pt. had multiple episodes of spontaneous diverticular bleeding in the past, last colonoscopy in 2019 consistent with diverticulosis. First episode of GIB since starting aspirin.     Bariatric surgery consulted for history of lap band placed in September 2014. Pt had lost 50 lbs after band placement, then gained 30lbs back. Band partially deflated in past due to nausea. Patient seen in ED, reporting mild lower abdominal discomfort. Pt. with leukocytosis 11.79, AVSS.       PAST MEDICAL & SURGICAL HISTORY:  Sleep apnea      Obesity, morbid, BMI 40.0-49.9      Anxiety      Hypertension      CAD (coronary artery disease)      S/P small bowel resection      History of umbilical hernia repair          MEDICATIONS  (STANDING):  folic acid Injectable 1 milliGRAM(s) IV Push Once  thiamine 100 milliGRAM(s) Oral Once    MEDICATIONS  (PRN):      Allergies    No Known Allergies    Intolerances        SOCIAL HISTORY:    FAMILY HISTORY:  FH: diverticulitis (Father)            Physical Exam:  General: NAD, resting comfortably  HEENT: NC/AT, EOMI, normal hearing, no oral lesions, no LAD, neck supple  Pulmonary: normal resp effort, CTA-B  Cardiovascular: RRR, no murmurs  Abdominal: soft, ND/NT, no organomegaly  Extremities: WWP, normal strength, no clubbing/cyanosis/edema  Neuro: A/O x 3, CNs II-XII grossly intact, normal sensation, no focal deficits    Vital Signs Last 24 Hrs  T(C): 36.9 (10 Nov 2022 02:34), Max: 36.9 (10 Nov 2022 02:34)  T(F): 98.4 (10 Nov 2022 02:34), Max: 98.4 (10 Nov 2022 02:34)  HR: 98 (10 Nov 2022 02:34) (98 - 98)  BP: 110/84 (10 Nov 2022 02:34) (110/84 - 110/84)  BP(mean): --  RR: 18 (10 Nov 2022 02:34) (18 - 18)  SpO2: 97% (10 Nov 2022 02:34) (97% - 97%)    Parameters below as of 10 Nov 2022 02:34  Patient On (Oxygen Delivery Method): room air        I&O's Summary          LABS:                        12.1   11.79 )-----------( 359      ( 10 Nov 2022 03:16 )             35.5     11-10    136  |  101  |  22  ----------------------------<  128<H>  3.9   |  22  |  0.87    Ca    9.0      10 Nov 2022 03:16    TPro  7.0  /  Alb  4.2  /  TBili  0.4  /  DBili  x   /  AST  47<H>  /  ALT  29  /  AlkPhos  58  11-10    PT/INR - ( 10 Nov 2022 03:16 )   PT: 13.0 sec;   INR: 1.12 ratio         PTT - ( 10 Nov 2022 03:16 )  PTT:34.0 sec    CAPILLARY BLOOD GLUCOSE        LIVER FUNCTIONS - ( 10 Nov 2022 03:16 )  Alb: 4.2 g/dL / Pro: 7.0 g/dL / ALK PHOS: 58 U/L / ALT: 29 U/L / AST: 47 U/L / GGT: x             Cultures:      RADIOLOGY & ADDITIONAL STUDIES:

## 2022-11-10 NOTE — H&P ADULT - PROBLEM SELECTOR PLAN 5
hold DVT prop in the context of GI bleed  Diet: NPO for now Hold DVT prop in the context of GI bleed  Diet: NPO for now  Dispo: pending clinical improvement

## 2022-11-10 NOTE — H&P ADULT - NSHPLABSRESULTS_GEN_ALL_CORE
LABS:                        12.1   11.79 )-----------( 359      ( 10 Nov 2022 03:16 )             35.5     11-10    136  |  101  |  22  ----------------------------<  128<H>  3.9   |  22  |  0.87    Ca    9.0      10 Nov 2022 03:16    TPro  7.0  /  Alb  4.2  /  TBili  0.4  /  DBili  x   /  AST  47<H>  /  ALT  29  /  AlkPhos  58  11-10    PT/INR - ( 10 Nov 2022 03:16 )   PT: 13.0 sec;   INR: 1.12 ratio         PTT - ( 10 Nov 2022 03:16 )  PTT:34.0 sec    --------------------------------------------------------------    < from: CT Abdomen and Pelvis w/ IV Cont (11.10.22 @ 05:40) >    IMPRESSION:  Nondilated rectum containing high attenuation fluid which may represent   blood.    No active extravasation of contrast into the bowel lumen to suggest   source of GI bleed at CT. If further evaluation is required correlation   with nuclear medicine GI bleeding scan could be performed.    Please refer to detailed findings otherwise described above.    --- End of Report ---    < end of copied text > LABS:                        12.1   11.79 )-----------( 359      ( 10 Nov 2022 03:16 )             35.5     11-10    136  |  101  |  22  ----------------------------<  128<H>  3.9   |  22  |  0.87    Ca    9.0      10 Nov 2022 03:16    TPro  7.0  /  Alb  4.2  /  TBili  0.4  /  DBili  x   /  AST  47<H>  /  ALT  29  /  AlkPhos  58  11-10    PT/INR - ( 10 Nov 2022 03:16 )   PT: 13.0 sec;   INR: 1.12 ratio         PTT - ( 10 Nov 2022 03:16 )  PTT:34.0 sec    --------------------------------------------------------------    < from: CT Abdomen and Pelvis w/ IV Cont (11.10.22 @ 05:40) >    IMPRESSION:  Nondilated rectum containing high attenuation fluid which may represent   blood.    No active extravasation of contrast into the bowel lumen to suggest   source of GI bleed at CT. If further evaluation is required correlation   with nuclear medicine GI bleeding scan could be performed.    Please refer to detailed findings otherwise described above.    --- End of Report ---    < end of copied text >      EKG personally reviewed: NSR with rate 80; no acute ischemic changes seen

## 2022-11-10 NOTE — ED ADULT NURSE REASSESSMENT NOTE - NS ED NURSE REASSESS COMMENT FT1
Received report from Gadiel SCHWAB. Pt. A&Ox3, resting at this time, skin pink warm and dry. Pt. does not appear to be in any acute distress - nonlabored breathing noted. Pt. updated on plan of care and medications administered as per MD order. Safety and comfort provided. Pt. is admitted and awaiting bed assignment.

## 2022-11-10 NOTE — ED ADULT NURSE NOTE - OBJECTIVE STATEMENT
THE PT IS A 52 Y.O. MALE C/O OF RECTAL BLEEDING, PT STATES THIS HAS HAPPENED 8-9 TIMES BEFORE. PT IS ON ROOM AIR, STEADY GAIT, PT DENIES N/V/D, PT IS AX4. PT REPORTS BLEEDING STARTED TODAY, AND PT REPORTS A HX OF DIVERTICULITIS.

## 2022-11-10 NOTE — H&P ADULT - PROBLEM SELECTOR PLAN 1
Patient with hematochezia as well as melena concerning for brisk upper GI bleed vs lower GI bleed and likley diverticular bleed given hx of diverticulosis. Ddx: angioectasia, colonic mass (less likely given colonoscopy results from 2 years ago), ulcer, anal fissure.   - Rectal exam in the ED with dark stool, fecal occult positive.   - Hgb 12.1 with has hgb 13.5 in 6/2022  - Started Protonix 40IV BID  - Check CBC q8, transfuse for Hgb <7  - Maintain active T&S  - keep NPO for now  - GI consult emailed, f/u recs. Patient with hematochezia as well as melena concerning for brisk upper GI bleed vs lower GI bleed and likley diverticular bleed given hx of diverticulosis. Ddx: angioectasia, colonic mass (less likely given colonoscopy results from 2 years ago), ulcer, anal fissure.   - Rectal exam in the ED with dark stool, fecal occult positive.   - Hgb 10.2 from 12.1// Hgb 13.5 in 6/2022  - Started Protonix 40IV BID  - Check CBC q6hrs, transfuse for Hgb <7  - Maintain active T&S  - keep NPO for now  -Bariatric surgery team consulted and recs appreciated  - GI consult emailed; pending recs.

## 2022-11-10 NOTE — H&P ADULT - HISTORY OF PRESENT ILLNESS
53 Y/O male with PMH of two episodes of diverticulosis with bleeding, ext and int hemorrhoids, colitis, CAD on ASA 81 qd, small bowel ulcer, HTN, s/p bowel resection for diverticulitis, lap band procedure, umbilical hernia repair presenting with GI bleeding since 2 AM. Pt. reports that at 2 AM he had a BM with bright red blood in enough quantity to obscure his view of the bottom of the toilet bowl. Since then, he had 8 more BMs with a mixture of bright red and dark red blood. Pt. reports having concurrent vague LLQ abdominal pain during these episodes as well as generalized fatigue which he states is not unusual for him. In the ED, patient reports experience nausea but denies LLQ pain. Pt. states that his last colonoscopy was several months ago and that it was unremarkable besides diverticulosis. Pt. reports last taking his ASA yesterday and states he has not take any other NSAIDS regularly or recently. Pt. denies chest pain, SOB, lightheadness, chills, hematuria, night sweats, unintentional weight changes, vomiting, hemoptysis  51 Y/O male with PMH of two episodes of diverticulosis with bleeding, ext and int hemorrhoids, colitis, CAD on ASA 81 qd, small bowel ulcer, HTN, s/p bowel resection for diverticulitis, lap band procedure, umbilical hernia repair presenting with GI bleeding since 2 AM. Pt. reports that at 2 AM he had a BM with bright red blood in enough quantity to obscure his view of the bottom of the toilet bowl. Since then, he had 8 more BMs with a mixture of bright red and dark red blood. Pt. reports having concurrent vague LLQ abdominal pain during these episodes as well as generalized fatigue which he states is not unusual for him. In the ED, patient reports experience nausea but denies LLQ pain. Pt. states that his last colonoscopy was several months ago and that it was unremarkable besides diverticulosis. Pt. reports last taking his ASA yesterday and states he has not take any other NSAIDS regularly or recently. Pt. denies chest pain, SOB, lightheadness, chills, hematuria, night sweats, unintentional weight changes, vomiting, hemoptysis.    Bariatric surgery consulted for history of lap band placed in September 2014. Pt had lost 50 lbs after band placement, then gained 30lbs back. Band partially deflated in past due to nausea. Patient seen in ED, reporting mild lower abdominal discomfort. Pt. with leukocytosis 11.79, AVSS.  52 year-old male PMH diverticulosis with bleeding x2, ext and int hemorrhoids, colitis, CAD on ASA 81 qd, small bowel ulcer, HTN, s/p bowel resection for diverticulitis, lap band procedure, umbilical hernia repair presenting with GI bleeding since 2 AM. Pt. reports that at 2 AM he had a BM with bright red blood in enough quantity to obscure his view of the bottom of the toilet bowl. Since then, he had 8 more BMs with a mixture of bright red and dark red blood. Pt. reports having concurrent vague LLQ abdominal pain during these episodes as well as generalized fatigue which he states is not unusual for him. In the ED, patient reports experience nausea but denies LLQ pain. Pt. states that his last colonoscopy was several months ago and that it was unremarkable besides diverticulosis. Pt. reports last taking his ASA yesterday and states he has not take any other NSAIDS regularly or recently. Pt. denies chest pain, SOB, lightheadness, chills, hematuria, night sweats, unintentional weight changes, vomiting, hemoptysis.    ED Course: Afebrile; hemodynamically stable and saturating well on room air. Bariatric surgery consulted for history of lap band placed in September 2014. Pt had lost 50 lbs after band placement, then gained 30lbs back. Band partially deflated in past due to nausea. Patient seen in ED, reporting mild lower abdominal discomfort. Pt. with leukocytosis 11.79, AVSS.

## 2022-11-10 NOTE — H&P ADULT - ATTENDING COMMENTS
52M with PMH of diverticulosis c/b diverticulitis s/p bowel resection, lap band surgery, CAD on aspirin, presents to the ED with one day of BRBPR.   Of note, patient admitted in March 2022 for similar presentation. Per chart review, during last admission had multiple episodes of BRBPR. Was evaluated by GI, Hgb and vitals had been stable during hospitalization and bleeding stopped in 24hr period. He had declined inpatient workup at the time and was advised to f/u outpatient with GI for EGD/colonscopy. Says he had completed the colonoscopy and reportedly unremarkable except for internal hemorrhoids. Had been doing well since yesterday around 2am when he experiencing lower abdominal cramping sensation and about 8-10 episodes of bloody bowel movements. Last episodes was in the ER around 7am. Has been on aspirin and NSAIDS for joint pain. Denies significant ETOH use. No fevers, chills, URI symptoms, CP, SOB, lightheadedness, dizziness.       #GIB likely lower GIB, diverticular bleed vs brisk UGIB   #Acute blood loss anemia   CTAP: Nondilated rectum containing high attenuation fluid which may represent blood. No active extravasation of contrast into the bowel lumen to suggest source of GI bleed at CT. Hgb 12 ->10.   -Active type and screen; consented for blood tx  -GI consulted   -IV PPI BID   -IVF   -CBC q6h 52M with PMH of diverticulosis c/b diverticulitis s/p bowel resection, lap band surgery, CAD on aspirin, presents to the ED with one day of BRBPR.   Of note, patient admitted in March 2022 for similar presentation. Per chart review, during last admission had multiple episodes of BRBPR. Was evaluated by GI, Hgb and vitals had been stable during hospitalization and bleeding stopped in 24hr period. He had declined inpatient workup at the time and was advised to f/u outpatient with GI for EGD/colonscopy. Says he had completed the colonoscopy and reportedly unremarkable except for internal hemorrhoids. Had been doing well since yesterday around 2am when he experiencing lower abdominal cramping sensation and about 8-10 episodes of bloody bowel movements. Last episodes was in the ER around 7am. Has been on aspirin and NSAIDS for joint pain. Denies significant ETOH use. No fevers, chills, URI symptoms, CP, SOB, lightheadedness, dizziness.       #GIB likely lower GIB, diverticular bleed  #Acute blood loss anemia   CTAP: Nondilated rectum containing high attenuation fluid which may represent blood. No active extravasation of contrast into the bowel lumen to suggest source of GI bleed at CT. Hgb 12 ->10.   -Active type and screen; consented for blood tx  -IV PPI BID   -IVF   -CBC q6h for now   -GI consulted; last colonoscopy in spring/summer 2022. Suspect diverticular bleed precipitated by NSAIDS. Plan for supportive care given recent colonoscopy and patient stability.   -CLD

## 2022-11-10 NOTE — ED PROVIDER NOTE - PHYSICAL EXAMINATION
GENERAL: NAD  HEENT:  Atraumatic  CHEST/LUNG: Chest rise equal bilaterally  HEART: Regular rate and rhythm  ABDOMEN: Soft, Nontender, Nondistended  EXTREMITIES:  Extremities warm  PSYCH: A&Ox3  SKIN: No obvious rashes or lesions   NEUROLOGY: strength and sensation intact in all extremities.   RECTAL: Chaperone Gadiel Dobbs RN. GENERAL: NAD  HEENT:  Atraumatic  CHEST/LUNG: Chest rise equal bilaterally  HEART: Regular rate and rhythm  ABDOMEN: Soft, Nontender, Nondistended  EXTREMITIES:  Extremities warm  PSYCH: A&Ox3  SKIN: No obvious rashes or lesions   NEUROLOGY: strength and sensation intact in all extremities.   RECTAL: Chaperone Maxi Jean MD. Bright red blood present on rectal exam.

## 2022-11-10 NOTE — ED ADULT TRIAGE NOTE - BP NONINVASIVE DIASTOLIC (MM HG)
COPD EDUCATION by COPD CLINICAL EDUCATOR  6/23/2018  at  4:26 PM by Iveth Mir     Patient interviewed by COPD education team.  Patient unable to participate in full program.  Short intervention has been conducted.  A comprehensive packet including information about COPD, treatments, and smoking cessation given. We reviewed use and demonstration of Spacer device for his Rescue medicine - Albuterol  
84

## 2022-11-11 LAB
ALBUMIN SERPL ELPH-MCNC: 3.7 G/DL — SIGNIFICANT CHANGE UP (ref 3.3–5)
ALP SERPL-CCNC: 47 U/L — SIGNIFICANT CHANGE UP (ref 40–120)
ALT FLD-CCNC: 24 U/L — SIGNIFICANT CHANGE UP (ref 10–45)
ANION GAP SERPL CALC-SCNC: 13 MMOL/L — SIGNIFICANT CHANGE UP (ref 5–17)
AST SERPL-CCNC: 33 U/L — SIGNIFICANT CHANGE UP (ref 10–40)
BASOPHILS # BLD AUTO: 0.05 K/UL — SIGNIFICANT CHANGE UP (ref 0–0.2)
BASOPHILS NFR BLD AUTO: 0.5 % — SIGNIFICANT CHANGE UP (ref 0–2)
BILIRUB SERPL-MCNC: 0.4 MG/DL — SIGNIFICANT CHANGE UP (ref 0.2–1.2)
BUN SERPL-MCNC: 15 MG/DL — SIGNIFICANT CHANGE UP (ref 7–23)
CALCIUM SERPL-MCNC: 8.5 MG/DL — SIGNIFICANT CHANGE UP (ref 8.4–10.5)
CHLORIDE SERPL-SCNC: 106 MMOL/L — SIGNIFICANT CHANGE UP (ref 96–108)
CO2 SERPL-SCNC: 21 MMOL/L — LOW (ref 22–31)
CREAT SERPL-MCNC: 0.77 MG/DL — SIGNIFICANT CHANGE UP (ref 0.5–1.3)
EGFR: 106 ML/MIN/1.73M2 — SIGNIFICANT CHANGE UP
EOSINOPHIL # BLD AUTO: 0.15 K/UL — SIGNIFICANT CHANGE UP (ref 0–0.5)
EOSINOPHIL NFR BLD AUTO: 1.6 % — SIGNIFICANT CHANGE UP (ref 0–6)
GLUCOSE BLDC GLUCOMTR-MCNC: 187 MG/DL — HIGH (ref 70–99)
GLUCOSE SERPL-MCNC: 161 MG/DL — HIGH (ref 70–99)
HCT VFR BLD CALC: 26.1 % — LOW (ref 39–50)
HCT VFR BLD CALC: 27.2 % — LOW (ref 39–50)
HCT VFR BLD CALC: 27.8 % — LOW (ref 39–50)
HCT VFR BLD CALC: 28.5 % — LOW (ref 39–50)
HCT VFR BLD CALC: 29.7 % — LOW (ref 39–50)
HGB BLD-MCNC: 10 G/DL — LOW (ref 13–17)
HGB BLD-MCNC: 9.1 G/DL — LOW (ref 13–17)
HGB BLD-MCNC: 9.3 G/DL — LOW (ref 13–17)
HGB BLD-MCNC: 9.5 G/DL — LOW (ref 13–17)
HGB BLD-MCNC: 9.7 G/DL — LOW (ref 13–17)
IMM GRANULOCYTES NFR BLD AUTO: 0.9 % — SIGNIFICANT CHANGE UP (ref 0–0.9)
LYMPHOCYTES # BLD AUTO: 1.91 K/UL — SIGNIFICANT CHANGE UP (ref 1–3.3)
LYMPHOCYTES # BLD AUTO: 20.5 % — SIGNIFICANT CHANGE UP (ref 13–44)
MAGNESIUM SERPL-MCNC: 1.8 MG/DL — SIGNIFICANT CHANGE UP (ref 1.6–2.6)
MCHC RBC-ENTMCNC: 30.3 PG — SIGNIFICANT CHANGE UP (ref 27–34)
MCHC RBC-ENTMCNC: 30.4 PG — SIGNIFICANT CHANGE UP (ref 27–34)
MCHC RBC-ENTMCNC: 30.6 PG — SIGNIFICANT CHANGE UP (ref 27–34)
MCHC RBC-ENTMCNC: 33.7 GM/DL — SIGNIFICANT CHANGE UP (ref 32–36)
MCHC RBC-ENTMCNC: 34 GM/DL — SIGNIFICANT CHANGE UP (ref 32–36)
MCHC RBC-ENTMCNC: 34.2 GM/DL — SIGNIFICANT CHANGE UP (ref 32–36)
MCHC RBC-ENTMCNC: 34.2 GM/DL — SIGNIFICANT CHANGE UP (ref 32–36)
MCHC RBC-ENTMCNC: 34.9 GM/DL — SIGNIFICANT CHANGE UP (ref 32–36)
MCV RBC AUTO: 87 FL — SIGNIFICANT CHANGE UP (ref 80–100)
MCV RBC AUTO: 89.1 FL — SIGNIFICANT CHANGE UP (ref 80–100)
MCV RBC AUTO: 89.1 FL — SIGNIFICANT CHANGE UP (ref 80–100)
MCV RBC AUTO: 89.5 FL — SIGNIFICANT CHANGE UP (ref 80–100)
MCV RBC AUTO: 90 FL — SIGNIFICANT CHANGE UP (ref 80–100)
MONOCYTES # BLD AUTO: 0.89 K/UL — SIGNIFICANT CHANGE UP (ref 0–0.9)
MONOCYTES NFR BLD AUTO: 9.5 % — SIGNIFICANT CHANGE UP (ref 2–14)
NEUTROPHILS # BLD AUTO: 6.24 K/UL — SIGNIFICANT CHANGE UP (ref 1.8–7.4)
NEUTROPHILS NFR BLD AUTO: 67 % — SIGNIFICANT CHANGE UP (ref 43–77)
NRBC # BLD: 0 /100 WBCS — SIGNIFICANT CHANGE UP (ref 0–0)
PHOSPHATE SERPL-MCNC: 1.7 MG/DL — LOW (ref 2.5–4.5)
PLATELET # BLD AUTO: 231 K/UL — SIGNIFICANT CHANGE UP (ref 150–400)
PLATELET # BLD AUTO: 231 K/UL — SIGNIFICANT CHANGE UP (ref 150–400)
PLATELET # BLD AUTO: 279 K/UL — SIGNIFICANT CHANGE UP (ref 150–400)
PLATELET # BLD AUTO: 303 K/UL — SIGNIFICANT CHANGE UP (ref 150–400)
PLATELET # BLD AUTO: 330 K/UL — SIGNIFICANT CHANGE UP (ref 150–400)
POTASSIUM SERPL-MCNC: 3.9 MMOL/L — SIGNIFICANT CHANGE UP (ref 3.5–5.3)
POTASSIUM SERPL-SCNC: 3.9 MMOL/L — SIGNIFICANT CHANGE UP (ref 3.5–5.3)
PROT SERPL-MCNC: 6.3 G/DL — SIGNIFICANT CHANGE UP (ref 6–8.3)
RBC # BLD: 3 M/UL — LOW (ref 4.2–5.8)
RBC # BLD: 3.04 M/UL — LOW (ref 4.2–5.8)
RBC # BLD: 3.12 M/UL — LOW (ref 4.2–5.8)
RBC # BLD: 3.2 M/UL — LOW (ref 4.2–5.8)
RBC # BLD: 3.3 M/UL — LOW (ref 4.2–5.8)
RBC # FLD: 12.5 % — SIGNIFICANT CHANGE UP (ref 10.3–14.5)
RBC # FLD: 12.7 % — SIGNIFICANT CHANGE UP (ref 10.3–14.5)
RBC # FLD: 13 % — SIGNIFICANT CHANGE UP (ref 10.3–14.5)
SODIUM SERPL-SCNC: 140 MMOL/L — SIGNIFICANT CHANGE UP (ref 135–145)
WBC # BLD: 12.05 K/UL — HIGH (ref 3.8–10.5)
WBC # BLD: 6.21 K/UL — SIGNIFICANT CHANGE UP (ref 3.8–10.5)
WBC # BLD: 8.03 K/UL — SIGNIFICANT CHANGE UP (ref 3.8–10.5)
WBC # BLD: 9.32 K/UL — SIGNIFICANT CHANGE UP (ref 3.8–10.5)
WBC # BLD: 9.6 K/UL — SIGNIFICANT CHANGE UP (ref 3.8–10.5)
WBC # FLD AUTO: 12.05 K/UL — HIGH (ref 3.8–10.5)
WBC # FLD AUTO: 6.21 K/UL — SIGNIFICANT CHANGE UP (ref 3.8–10.5)
WBC # FLD AUTO: 8.03 K/UL — SIGNIFICANT CHANGE UP (ref 3.8–10.5)
WBC # FLD AUTO: 9.32 K/UL — SIGNIFICANT CHANGE UP (ref 3.8–10.5)
WBC # FLD AUTO: 9.6 K/UL — SIGNIFICANT CHANGE UP (ref 3.8–10.5)

## 2022-11-11 PROCEDURE — 74177 CT ABD & PELVIS W/CONTRAST: CPT | Mod: 26

## 2022-11-11 PROCEDURE — 99233 SBSQ HOSP IP/OBS HIGH 50: CPT | Mod: GC

## 2022-11-11 PROCEDURE — 99233 SBSQ HOSP IP/OBS HIGH 50: CPT

## 2022-11-11 RX ORDER — SODIUM CHLORIDE 9 MG/ML
1000 INJECTION INTRAMUSCULAR; INTRAVENOUS; SUBCUTANEOUS
Refills: 0 | Status: DISCONTINUED | OUTPATIENT
Start: 2022-11-11 | End: 2022-11-12

## 2022-11-11 RX ORDER — INFLUENZA VIRUS VACCINE 15; 15; 15; 15 UG/.5ML; UG/.5ML; UG/.5ML; UG/.5ML
0.5 SUSPENSION INTRAMUSCULAR ONCE
Refills: 0 | Status: DISCONTINUED | OUTPATIENT
Start: 2022-11-11 | End: 2022-11-14

## 2022-11-11 RX ADMIN — SODIUM CHLORIDE 100 MILLILITER(S): 9 INJECTION INTRAMUSCULAR; INTRAVENOUS; SUBCUTANEOUS at 17:49

## 2022-11-11 RX ADMIN — PANTOPRAZOLE SODIUM 40 MILLIGRAM(S): 20 TABLET, DELAYED RELEASE ORAL at 05:33

## 2022-11-11 RX ADMIN — PANTOPRAZOLE SODIUM 40 MILLIGRAM(S): 20 TABLET, DELAYED RELEASE ORAL at 17:06

## 2022-11-11 NOTE — PROGRESS NOTE ADULT - ATTENDING COMMENTS
Patient found by GI team laying across bed unconscious but with pulse and arousable  repositioned on bed - appeared pale, diaphoretic  rapid called, initially with hypotension and tachycardia    Suspect patient with ongoing hematochezia likely 2/2 diverticular bleed    Plan for supportive care given recurrent diverticular bleed  if unstable -> would benefit from CTA with Ir intervention rather than repeat endoscopy    GI to follow

## 2022-11-11 NOTE — RAPID RESPONSE TEAM SUMMARY - NSSITUATIONBACKGROUNDRRT_GEN_ALL_CORE
52 year-old male PMH diverticulosis with bleeding x2, s/p bowel resection for diverticulitis, lap band procedure, ext and int hemorrhoids, colitis, small bowel ulcer, lap band procedure, CAD on ASA 81 qd, presenting acutely with BRBPR GI bleeding c/f lower GI bleed vs brisk upper GI bleed.  RRT was called for hypotension with SBP in 90s. Baseline SBP was 110-120. Per nurse, he has active GIB with bloody stool this morning. CBC, CMP, INR, VBG with lactate were ordered. Per CT abdomen, no obvious bleeding. Per GI, no plan for scoping, if unstable then CTA. He is  52 year-old male PMH diverticulosis with bleeding x2, s/p bowel resection for diverticulitis, lap band procedure, ext and int hemorrhoids, colitis, small bowel ulcer, lap band procedure, CAD on ASA 81 qd, presenting acutely with BRBPR GI bleeding c/f lower GI bleed vs brisk upper GI bleed.  RRT was called for hypotension with SBP in 90s. Baseline SBP was 110-120. Per nurse, he has active GIB with bloody stool this morning. CBC, CMP, INR, VBG with lactate were ordered. Per CT abdomen, no obvious bleeding. Per GI, no plan for scoping, if unstable then CTA. He has been on fluids at 75 cc/hr. 1 u of pRBC and 1L of fluids were ordered.   Updated primary team at bedside.

## 2022-11-11 NOTE — PROGRESS NOTE ADULT - SUBJECTIVE AND OBJECTIVE BOX
PATIENT: Ghulam Marx     CHIEF COMPLAINT: Patient is a 52 Y old male presenting with lower GI bleed       INTERVAL HISTORY/OVERNIGHT EVENTS: Overnight pt. reports 4 bloody BM as well as fatigue and lightheadness when straining on the toilet. Patient denies abdominal pain. Denies chest pain or SOB, cough. Has been ambulating without assistance. Oriented to person, place, and time. Breathing comfortably on room air. This morning, pt. was evaluated by GI who noticed the patient was tachycardic, hypotensive and in acute distress. Rapid response was called and pt. received a bolus and maintenance fluids as well as PRBCs. Pt. is hemodynamically stable after receiving blood and fluids.     REVIEW OF SYSTEMS:    Constitutional:     [x ] negative [ ] fevers [ ] chills [ ] weight loss [ ] weight gain  HEENT:                  [ x] negative [ ] dry eyes [ ] eye irritation [ ] postnasal drip [ ] nasal congestion  CV:                         [x ] negative  [ ] chest pain [ ] orthopnea [ ] palpitations [ ] murmur  Resp:                     [x ] negative [ ] cough [ ] shortness of breath [ ] dyspnea [ ] wheezing [ ] sputum [ ] hemoptysis  GI:                          [x ] negative [ ] nausea [ ] vomiting [ ] diarrhea [ ] constipation [ ] abd pain [ ] dysphagia   :                        [ x] negative [ ] dysuria [ ] nocturia [ ] hematuria [ ] increased urinary frequency  Musculoskeletal: [ x] negative [ ] back pain [ ] myalgias [ ] arthralgias [ ] fracture  Skin:                       [x ] negative [ ] rash [ ] itch  Neurological:        [x ] negative [ ] headache [ ] dizziness [ ] syncope [ ] weakness [ ] numbness  Psychiatric:           [x ] negative [ ] anxiety [ ] depression  Endocrine:            [x ] negative [ ] diabetes [ ] thyroid problem  Heme/Lymph:      [ x] negative [ ] anemia [ ] bleeding problem  Allergic/Immune: [ x] negative [ ] itchy eyes [ ] nasal discharge [ ] hives [ ] angioedema    [x ] All other systems negative  [ ] Unable to assess ROS because ________.    MEDICATIONS:  MEDICATIONS  (STANDING):  influenza   Vaccine 0.5 milliLiter(s) IntraMuscular once  pantoprazole  Injectable 40 milliGRAM(s) IV Push two times a day  sodium chloride 0.9%. 1000 milliLiter(s) (100 mL/Hr) IV Continuous <Continuous>  sodium chloride 0.9%. 1000 milliLiter(s) (100 mL/Hr) IV Continuous <Continuous>      MEDICATIONS  (PRN):      ALLERGIES: Allergies    No Known Allergies    Intolerances        OBJECTIVE:  ICU Vital Signs Last 24 Hrs  T(C): 36.2 (11 Nov 2022 11:12), Max: 37.2 (10 Nov 2022 16:13)  T(F): 97.1 (11 Nov 2022 11:12), Max: 98.9 (10 Nov 2022 16:13)  HR: 114 (11 Nov 2022 11:12) (72 - 114)  BP: 119/82 (11 Nov 2022 11:12) (116/68 - 128/91)  BP(mean): --  ABP: --  ABP(mean): --  RR: 18 (11 Nov 2022 11:12) (16 - 18)  SpO2: 96% (11 Nov 2022 11:12) (96% - 98%)    O2 Parameters below as of 11 Nov 2022 11:12  Patient On (Oxygen Delivery Method): room air        PHYSICAL EXAMINATION:  General: no acute distress, cooperative with exam, pt. states he has been feeling fatigued and lightheaded   HEENT: PERRLA, EOMI, moist mucous membranes, pale conjunctiva  Respiratory: CTAB, normal respiratory effort, no coughing, wheezes, crackles, or rales.  CV: Tachycardia, S1S2, no murmurs, rubs or gallops. No JVD. Distal pulses intact.  Abdominal: Soft, nontender, nondistended, no rebound or guarding, normal bowel sounds.  Neurology: AOx3, no focal neuro defects, PADRON x 4.  Extremities: No pitting edema, + Peripheral pulses.      LABS:                                             9.5    8.03  )-----------( 279      ( 11 Nov 2022 11:14 )             27.8       11-11    140  |  106  |  15  ----------------------------<  161<H>  3.9   |  21<L>  |  0.77    Ca    8.5      11 Nov 2022 11:13  Phos  1.7     11-11  Mg     1.8     11-11    TPro  6.3  /  Alb  3.7  /  TBili  0.4  /  DBili  x   /  AST  33  /  ALT  24  /  AlkPhos  47  11-11    Imaging  < from: CT Abdomen and Pelvis w/ IV Cont (11.10.22 @ 05:40) >    ACC: [id number] EXAM:  CT ABDOMEN AND PELVIS IC                          PROCEDURE DATE:  11/10/2022          INTERPRETATION:  CLINICAL INFORMATION: Gastrointestinal hemorrhage.   History of diverticulitis status post low anterior resection. CAD on   aspirin.    COMPARISON: CT enterography dated 7/5/2022.    CONTRAST/COMPLICATIONS:  IV Contrast: Omnipaque 350  90 cc administered   10 cc discarded  Oral Contrast: NONE  Complications: None reported at time of study completion    PROCEDURE:  CT of the Abdomen and Pelvis was performed.  Precontrast, Arterial and Delayed phases were performed.  Sagittal and coronal reformats were performed.    FINDINGS:  LOWER CHEST: Within normal limits.    LIVER: Steatosis.  BILE DUCTS: Normal caliber.  GALLBLADDER: Normal appearing fluid-filled gallbladder. No dilation nor   gallbladder wall thickening.  SPLEEN: Punctate calcifications.  PANCREAS: Mild fatty replacement.  ADRENALS: Within normal limits.  KIDNEYS/URETERS: Symmetric enhancement bilaterally. No hydronephrosis nor   renal mass    BLADDER: Nondistended fluid-filled bladder.  REPRODUCTIVE ORGANS: Prostate is within normal limits    BOWEL: Status post lap band procedure with phi angle of less than 45   degrees. Status post low anterior resection with rectosigmoid   anastomosis. Nondistended rectum and sigmoid colon with high attenuation   internal material. No extravasation of contrast into the bowel lumen.  Normal appendix.  PERITONEUM: No ascites.  No free air or abscess.  VESSELS: Atherosclerotic changes. Small area of aneurysmal bulging within   the infrarenal abdominal aorta coronal 9-55. Maximum cross-sectional   diameter 1.9 x 2.2 cm. No acute dissection, leak or rupture.  RETROPERITONEUM/LYMPH NODES: No lymphadenopathy.  ABDOMINAL WALL: Postsurgical changes anterior abdominal wall consistent   with abdominal wall reconstruction.  BONES: Within normal limits.    IMPRESSION:  Nondilated rectum containing high attenuation fluid which may represent   blood.    No active extravasation of contrast into the bowel lumen to suggest   source of GI bleed at CT. If further evaluation is required correlation   with nuclear medicine GI bleeding scan could be performed.    Please refer to detailed findings otherwise described above.    --- End of Report ---           ANN LIVINGSTON MD; Resident Radiologist  This document has been electronically signed.   ARIANNA SON MD; Attending Radiologist  This document has been electronically signed. Nov 10 2022  6:02AM    < end of copied text >

## 2022-11-11 NOTE — PROGRESS NOTE ADULT - ASSESSMENT
53yo M with PMH of diverticulosis c/b diverticulitis s/p bowel resection, lap band surgery, CAD on aspirin who presents with rectal bleeding x 1 day.    # Rectal bleeding - in the setting of a recent colonoscopy with pan-diverticulosis. Suspect diverticulosis as the likely etiology. Low suspicion for malignancy given recent negative colonoscopy. Diverticular bleeds tend to self resolve without intervention and the role for a colonoscopy is usually a diagnostic one. Given that patient's bleeding seemed to have slowed down significantly, and given the recent reportedly negative colonoscopy (except reported diverticulosis) reasonable to defer from repeat colonoscopy at this time. Patient voiced preference to avoid a repeat colonoscopy as well.    Recommendations:  - Monitor for recurrence of bleeding  - Trend CBC, transfuse for Hb>7, PLT>50  - No plans for colonoscopy at this time  - Clear liquid diet for now  - GI will follow    Please note that the recommendations are not final until attested by an attending.    Thank you for involving us in the care of this patient. Please reach out if any further questions.    Eddie Garrison, PGY-6  Gastroenterology/Hepatology Fellow    Available on [employer] Teams  After 5PM/Weekends, please contact the on-call GI fellow: [phone]   53yo M with PMH of diverticulosis c/b diverticulitis s/p bowel resection, lap band surgery, CAD on aspirin who presents with rectal bleeding x 1 day.    # Syncope - suspect vasovagal/ orthostatic given onset with change of position and quick return to baseline with no intervention. Hb overall stable.   # Rectal bleeding - in the setting of a recent colonoscopy with pan-diverticulosis. Suspect diverticulosis as the likely etiology. Low suspicion for malignancy given recent negative colonoscopy. Diverticular bleeds tend to self resolve without intervention and the role for a colonoscopy is usually a diagnostic one. Given recent reportedly negative colonoscopy (except reported diverticulosis) reasonable to defer from repeat colonoscopy at this time. Patient voiced preference to avoid a repeat colonoscopy as well.     Recommendations:  - Monitor for recurrence of bleeding  - IVF  - Trend CBC, transfuse for Hb>7, PLT>50  - No plans for colonoscopy at this time  - Clear liquid diet for now  - GI will follow    Please note that the recommendations are not final until attested by an attending.    Thank you for involving us in the care of this patient. Please reach out if any further questions.    Eddie Garrison, PGY-6  Gastroenterology/Hepatology Fellow    Available on [employer] Teams  After 5PM/Weekends, please contact the on-call GI fellow: [phone]

## 2022-11-11 NOTE — PROGRESS NOTE ADULT - ATTENDING COMMENTS
52M with PMH of diverticulosis c/b diverticulitis s/p bowel resection, lap band surgery, CAD on aspirin, presents to the ED with one day of BRBPR.   Of note, patient admitted in March 2022 for similar presentation. Per chart review, during last admission had multiple episodes of BRBPR. Was evaluated by GI, Hgb and vitals had been stable during hospitalization and bleeding stopped in 24hr period. He had declined inpatient workup at the time and was advised to f/u outpatient with GI for EGD/colonscopy. Says he had completed the colonoscopy and reportedly unremarkable except for internal hemorrhoids. Had been doing well since yesterday around 2am when he experiencing lower abdominal cramping sensation and about 8-10 episodes of bloody bowel movements. Last episodes was in the ER around 7am. Has been on aspirin and NSAIDS for joint pain. Denies significant ETOH use. No fevers, chills, URI symptoms, CP, SOB, lightheadedness, dizziness.       #GIB likely lower GIB, diverticular bleed  #Acute blood loss anemia   CTAP: Nondilated rectum containing high attenuation fluid which may represent blood. No active extravasation of contrast into the bowel lumen to suggest source of GI bleed at CT. Hgb 12 ->10.   -Active type and screen; consented for blood tx, 2 large bore IVs  -IV PPI BID   -IVF   -CBC q6h for now   -RRT today for lightheadedness, hypotension, tachycardia. Has 2-3 episodes of bloody bowel movements. Received 1L IVF bolus, 1u pRBC, repeat stat labs. D/w GI: last colonoscopy in spring/summer 2022. Suspect diverticular bleed precipitated by NSAIDS. Plan for supportive care given recent colonoscopy.   -If he continues to have hematochezia, will plan for CTA with possible IR evaluation pending CT results 52M with PMH of diverticulosis c/b diverticulitis s/p bowel resection, lap band surgery, CAD on aspirin, presents to the ED with one day of BRBPR.   Of note, patient admitted in March 2022 for similar presentation. Per chart review, during last admission had multiple episodes of BRBPR. Was evaluated by GI, Hgb and vitals had been stable during hospitalization and bleeding stopped in 24hr period. He had declined inpatient workup at the time and was advised to f/u outpatient with GI for EGD/colonscopy. Says he had completed the colonoscopy and reportedly unremarkable except for internal hemorrhoids. Had been doing well since yesterday around 2am when he experiencing lower abdominal cramping sensation and about 8-10 episodes of bloody bowel movements. Last episodes was in the ER around 7am. Has been on aspirin and NSAIDS for joint pain. Denies significant ETOH use. No fevers, chills, URI symptoms, CP, SOB, lightheadedness, dizziness.       #GIB likely lower GIB, diverticular bleed  #Acute blood loss anemia   CTAP: Nondilated rectum containing high attenuation fluid which may represent blood. No active extravasation of contrast into the bowel lumen to suggest source of GI bleed at CT. Hgb 12 ->10.   -Active type and screen; consented for blood tx, 2 large bore IVs  -IV PPI BID   -IVF   -CBC q6h for now   -RRT today for lightheadedness, hypotension, tachycardia. Has 2-3 episodes of bloody bowel movements. Received 1L IVF bolus, 1u pRBC, repeat stat labs. SBP improved 110s. D/w GI: last colonoscopy in spring/summer 2022. Suspect diverticular bleed precipitated by NSAIDS. Plan for supportive care given recent colonoscopy.   -If he continues to have hematochezia, will plan for CTA with possible IR evaluation pending CT results

## 2022-11-11 NOTE — PROGRESS NOTE ADULT - PROBLEM SELECTOR PLAN 1
Patient with hematochezia as well as melena concerning for brisk upper GI bleed vs lower GI bleed and likley diverticular bleed given hx of diverticulosis. Ddx: angioectasia, colonic mass (less likely given colonoscopy results from 2 years ago), ulcer, anal fissure.   - Rectal exam in the ED with dark stool, fecal occult positive.   - Hgb 10.2 from 12.1// Hgb 13.5 in 6/2022  - Started Protonix 40IV BID  - Check CBC q6hrs, transfuse for Hgb <7  - Maintain active T&S  - keep NPO for now  -Bariatric surgery team consulted and recs appreciated  - GI consult emailed; pending recs. Patient with hematochezia as well as melena concerning for brisk upper GI bleed vs lower GI bleed and likley diverticular bleed given hx of diverticulosis. Ddx: angioectasia, colonic mass (less likely given colonoscopy results from 2 years ago), ulcer, anal fissure.   - Rectal exam in the ED with dark stool, fecal occult positive.   - Hgb 9.5 from 12.1// Hgb 13.5 in 6/2022  - Rapid called 11/11 due to hypotension, tachycardia, and lightheadness. Pt. was given 1 pRBCs and fluid bolus, is now normotensive.   - Started Protonix 40IV BID  - Check CBC q6hrs, transfuse for Hgb <7  - Maintain active T&S  - keep NPO for now  -Bariatric surgery team consulted and recs appreciated  - GI does not recommend scope at this time, CTA recommended if patient does not clinically improve

## 2022-11-11 NOTE — PATIENT PROFILE ADULT - FALL HARM RISK - UNIVERSAL INTERVENTIONS
Bed in lowest position, wheels locked, appropriate side rails in place/Call bell, personal items and telephone in reach/Instruct patient to call for assistance before getting out of bed or chair/Non-slip footwear when patient is out of bed/[place] to call system/Physically safe environment - no spills, clutter or unnecessary equipment/Purposeful Proactive Rounding/Room/bathroom lighting operational, light cord in reach

## 2022-11-11 NOTE — PROGRESS NOTE ADULT - ASSESSMENT
52 year-old male PMH diverticulosis with bleeding x2, s/p bowel resection for diverticulitis, lap band procedure, ext and int hemorrhoids, colitis, small bowel ulcer, lap band procedure, CAD on ASA 81 qd, presenting acutely with BRBPR GI bleeding c/f lower GI bleed vs brisk upper GI bleed.

## 2022-11-11 NOTE — PROGRESS NOTE ADULT - SUBJECTIVE AND OBJECTIVE BOX
Interval Events:   - No BMs until 7AM this morning, when he had another large bloody BM  - H/H overall stable      Allergies:  No Known Allergies        Hospital Medications:  influenza   Vaccine 0.5 milliLiter(s) IntraMuscular once  pantoprazole  Injectable 40 milliGRAM(s) IV Push two times a day  sodium chloride 0.9%. 1000 milliLiter(s) IV Continuous <Continuous>      PMHX/PSHX:  Hypertension    Sleep apnea    Obesity, morbid, BMI 40.0-49.9    Anxiety    Hypertension    CAD (coronary artery disease)    Internal hemorrhoids    External hemorrhoids    S/P small bowel resection    History of umbilical hernia repair        Family history:  Family history of coronary artery disease    Family history of hypertension    FH: diverticulitis (Father)        ROS: As per HPI, otherwise 14-point ROS reviewed and negative.      PHYSICAL EXAM:   Vital Signs:  Vital Signs Last 24 Hrs  T(C): 36.6 (11 Nov 2022 03:55), Max: 37.2 (10 Nov 2022 16:13)  T(F): 97.8 (11 Nov 2022 03:55), Max: 98.9 (10 Nov 2022 16:13)  HR: 82 (11 Nov 2022 03:55) (72 - 98)  BP: 116/68 (11 Nov 2022 03:55) (116/68 - 135/88)  BP(mean): --  RR: 18 (11 Nov 2022 03:55) (16 - 18)  SpO2: 98% (11 Nov 2022 03:55) (96% - 100%)    Parameters below as of 11 Nov 2022 03:55  Patient On (Oxygen Delivery Method): room air      Daily     Daily       GENERAL:  No acute distress  HEENT:  Normocephalic/atraumatic, no scleral icterus  CHEST:  No accessory muscle use  HEART:  Regular rate and rhythm  ABDOMEN:  Soft, non-tender, non-distended  EXTREMITIES: No cyanosis, clubbing, or edema  SKIN:  No rash  NEURO:  Alert and oriented x 3, no asterixis      LABS:                        9.7    6.21  )-----------( 231      ( 11 Nov 2022 01:02 )             28.5     Mean Cell Volume: 89.1 fl (11-11-22 @ 01:02)    11-10    138  |  104  |  20  ----------------------------<  119<H>  4.1   |  25  |  0.74    Ca    8.7      10 Nov 2022 11:55  Mg     1.8     11-10    TPro  6.4  /  Alb  3.7  /  TBili  0.6  /  DBili  x   /  AST  40  /  ALT  26  /  AlkPhos  48  11-10    LIVER FUNCTIONS - ( 10 Nov 2022 11:55 )  Alb: 3.7 g/dL / Pro: 6.4 g/dL / ALK PHOS: 48 U/L / ALT: 26 U/L / AST: 40 U/L / GGT: x           PT/INR - ( 10 Nov 2022 03:16 )   PT: 13.0 sec;   INR: 1.12 ratio         PTT - ( 10 Nov 2022 03:16 )  PTT:34.0 sec                            9.7    6.21  )-----------( 231      ( 11 Nov 2022 01:02 )             28.5                         10.2   6.81  )-----------( 250      ( 10 Nov 2022 16:18 )             29.8                         10.6   7.34  )-----------( 263      ( 10 Nov 2022 11:55 )             30.6                         12.1   11.79 )-----------( 359      ( 10 Nov 2022 03:16 )             35.5       Imaging:           Interval Events:   - No BMs until 7AM this morning, when he had another large bloody BM  - H/H overall stable    Addendum: Patient again seen later during rounds. Found pale and unresponsive on bed. RRT was called. Initially hypotensive, tachycardic. IVF started with quick return to baseline. No active bleeding at that time. Repeat Hb 9.7-->9.5.       Allergies:  No Known Allergies        Hospital Medications:  influenza   Vaccine 0.5 milliLiter(s) IntraMuscular once  pantoprazole  Injectable 40 milliGRAM(s) IV Push two times a day  sodium chloride 0.9%. 1000 milliLiter(s) IV Continuous <Continuous>      PMHX/PSHX:  Hypertension    Sleep apnea    Obesity, morbid, BMI 40.0-49.9    Anxiety    Hypertension    CAD (coronary artery disease)    Internal hemorrhoids    External hemorrhoids    S/P small bowel resection    History of umbilical hernia repair        Family history:  Family history of coronary artery disease    Family history of hypertension    FH: diverticulitis (Father)        ROS: As per HPI, otherwise 14-point ROS reviewed and negative.      PHYSICAL EXAM:   Vital Signs:  Vital Signs Last 24 Hrs  T(C): 36.6 (11 Nov 2022 03:55), Max: 37.2 (10 Nov 2022 16:13)  T(F): 97.8 (11 Nov 2022 03:55), Max: 98.9 (10 Nov 2022 16:13)  HR: 82 (11 Nov 2022 03:55) (72 - 98)  BP: 116/68 (11 Nov 2022 03:55) (116/68 - 135/88)  BP(mean): --  RR: 18 (11 Nov 2022 03:55) (16 - 18)  SpO2: 98% (11 Nov 2022 03:55) (96% - 100%)    Parameters below as of 11 Nov 2022 03:55  Patient On (Oxygen Delivery Method): room air      Daily     Daily       GENERAL:  No acute distress  HEENT:  Normocephalic/atraumatic, no scleral icterus  CHEST:  No accessory muscle use  HEART:  Regular rate and rhythm  ABDOMEN:  Soft, non-tender, non-distended  EXTREMITIES: No cyanosis, clubbing, or edema  SKIN:  No rash  NEURO:  Alert and oriented x 3, no asterixis      LABS:                        9.7    6.21  )-----------( 231      ( 11 Nov 2022 01:02 )             28.5     Mean Cell Volume: 89.1 fl (11-11-22 @ 01:02)    11-10    138  |  104  |  20  ----------------------------<  119<H>  4.1   |  25  |  0.74    Ca    8.7      10 Nov 2022 11:55  Mg     1.8     11-10    TPro  6.4  /  Alb  3.7  /  TBili  0.6  /  DBili  x   /  AST  40  /  ALT  26  /  AlkPhos  48  11-10    LIVER FUNCTIONS - ( 10 Nov 2022 11:55 )  Alb: 3.7 g/dL / Pro: 6.4 g/dL / ALK PHOS: 48 U/L / ALT: 26 U/L / AST: 40 U/L / GGT: x           PT/INR - ( 10 Nov 2022 03:16 )   PT: 13.0 sec;   INR: 1.12 ratio         PTT - ( 10 Nov 2022 03:16 )  PTT:34.0 sec                            9.7    6.21  )-----------( 231      ( 11 Nov 2022 01:02 )             28.5                         10.2   6.81  )-----------( 250      ( 10 Nov 2022 16:18 )             29.8                         10.6   7.34  )-----------( 263      ( 10 Nov 2022 11:55 )             30.6                         12.1   11.79 )-----------( 359      ( 10 Nov 2022 03:16 )             35.5       Imaging:

## 2022-11-12 LAB
ALBUMIN SERPL ELPH-MCNC: 3.6 G/DL — SIGNIFICANT CHANGE UP (ref 3.3–5)
ALP SERPL-CCNC: 41 U/L — SIGNIFICANT CHANGE UP (ref 40–120)
ALT FLD-CCNC: 20 U/L — SIGNIFICANT CHANGE UP (ref 10–45)
ANION GAP SERPL CALC-SCNC: 10 MMOL/L — SIGNIFICANT CHANGE UP (ref 5–17)
AST SERPL-CCNC: 23 U/L — SIGNIFICANT CHANGE UP (ref 10–40)
BILIRUB SERPL-MCNC: 0.4 MG/DL — SIGNIFICANT CHANGE UP (ref 0.2–1.2)
BUN SERPL-MCNC: 12 MG/DL — SIGNIFICANT CHANGE UP (ref 7–23)
CALCIUM SERPL-MCNC: 8.6 MG/DL — SIGNIFICANT CHANGE UP (ref 8.4–10.5)
CHLORIDE SERPL-SCNC: 106 MMOL/L — SIGNIFICANT CHANGE UP (ref 96–108)
CO2 SERPL-SCNC: 24 MMOL/L — SIGNIFICANT CHANGE UP (ref 22–31)
CREAT SERPL-MCNC: 0.8 MG/DL — SIGNIFICANT CHANGE UP (ref 0.5–1.3)
EGFR: 105 ML/MIN/1.73M2 — SIGNIFICANT CHANGE UP
GLUCOSE SERPL-MCNC: 107 MG/DL — HIGH (ref 70–99)
HCT VFR BLD CALC: 24.8 % — LOW (ref 39–50)
HCT VFR BLD CALC: 25.2 % — LOW (ref 39–50)
HCT VFR BLD CALC: 25.2 % — LOW (ref 39–50)
HCT VFR BLD CALC: 25.4 % — LOW (ref 39–50)
HCT VFR BLD CALC: 27 % — LOW (ref 39–50)
HGB BLD-MCNC: 8.3 G/DL — LOW (ref 13–17)
HGB BLD-MCNC: 8.3 G/DL — LOW (ref 13–17)
HGB BLD-MCNC: 8.4 G/DL — LOW (ref 13–17)
HGB BLD-MCNC: 8.6 G/DL — LOW (ref 13–17)
HGB BLD-MCNC: 9.1 G/DL — LOW (ref 13–17)
MAGNESIUM SERPL-MCNC: 1.9 MG/DL — SIGNIFICANT CHANGE UP (ref 1.6–2.6)
MCHC RBC-ENTMCNC: 29.9 PG — SIGNIFICANT CHANGE UP (ref 27–34)
MCHC RBC-ENTMCNC: 30.3 PG — SIGNIFICANT CHANGE UP (ref 27–34)
MCHC RBC-ENTMCNC: 30.4 PG — SIGNIFICANT CHANGE UP (ref 27–34)
MCHC RBC-ENTMCNC: 30.5 PG — SIGNIFICANT CHANGE UP (ref 27–34)
MCHC RBC-ENTMCNC: 32.9 GM/DL — SIGNIFICANT CHANGE UP (ref 32–36)
MCHC RBC-ENTMCNC: 33.7 GM/DL — SIGNIFICANT CHANGE UP (ref 32–36)
MCHC RBC-ENTMCNC: 33.9 GM/DL — SIGNIFICANT CHANGE UP (ref 32–36)
MCHC RBC-ENTMCNC: 33.9 GM/DL — SIGNIFICANT CHANGE UP (ref 32–36)
MCV RBC AUTO: 88.8 FL — SIGNIFICANT CHANGE UP (ref 80–100)
MCV RBC AUTO: 89.9 FL — SIGNIFICANT CHANGE UP (ref 80–100)
MCV RBC AUTO: 90.1 FL — SIGNIFICANT CHANGE UP (ref 80–100)
MCV RBC AUTO: 92 FL — SIGNIFICANT CHANGE UP (ref 80–100)
NRBC # BLD: 0 /100 WBCS — SIGNIFICANT CHANGE UP (ref 0–0)
PHOSPHATE SERPL-MCNC: 2.7 MG/DL — SIGNIFICANT CHANGE UP (ref 2.5–4.5)
PLATELET # BLD AUTO: 221 K/UL — SIGNIFICANT CHANGE UP (ref 150–400)
PLATELET # BLD AUTO: 224 K/UL — SIGNIFICANT CHANGE UP (ref 150–400)
PLATELET # BLD AUTO: 226 K/UL — SIGNIFICANT CHANGE UP (ref 150–400)
PLATELET # BLD AUTO: 246 K/UL — SIGNIFICANT CHANGE UP (ref 150–400)
POTASSIUM SERPL-MCNC: 4 MMOL/L — SIGNIFICANT CHANGE UP (ref 3.5–5.3)
POTASSIUM SERPL-SCNC: 4 MMOL/L — SIGNIFICANT CHANGE UP (ref 3.5–5.3)
PROT SERPL-MCNC: 5.9 G/DL — LOW (ref 6–8.3)
RBC # BLD: 2.74 M/UL — LOW (ref 4.2–5.8)
RBC # BLD: 2.76 M/UL — LOW (ref 4.2–5.8)
RBC # BLD: 2.82 M/UL — LOW (ref 4.2–5.8)
RBC # BLD: 3.04 M/UL — LOW (ref 4.2–5.8)
RBC # FLD: 12.9 % — SIGNIFICANT CHANGE UP (ref 10.3–14.5)
RBC # FLD: 13 % — SIGNIFICANT CHANGE UP (ref 10.3–14.5)
RBC # FLD: 13.1 % — SIGNIFICANT CHANGE UP (ref 10.3–14.5)
RBC # FLD: 13.1 % — SIGNIFICANT CHANGE UP (ref 10.3–14.5)
SODIUM SERPL-SCNC: 140 MMOL/L — SIGNIFICANT CHANGE UP (ref 135–145)
WBC # BLD: 6.79 K/UL — SIGNIFICANT CHANGE UP (ref 3.8–10.5)
WBC # BLD: 6.86 K/UL — SIGNIFICANT CHANGE UP (ref 3.8–10.5)
WBC # BLD: 6.86 K/UL — SIGNIFICANT CHANGE UP (ref 3.8–10.5)
WBC # BLD: 8.21 K/UL — SIGNIFICANT CHANGE UP (ref 3.8–10.5)
WBC # FLD AUTO: 6.79 K/UL — SIGNIFICANT CHANGE UP (ref 3.8–10.5)
WBC # FLD AUTO: 6.86 K/UL — SIGNIFICANT CHANGE UP (ref 3.8–10.5)
WBC # FLD AUTO: 6.86 K/UL — SIGNIFICANT CHANGE UP (ref 3.8–10.5)
WBC # FLD AUTO: 8.21 K/UL — SIGNIFICANT CHANGE UP (ref 3.8–10.5)

## 2022-11-12 PROCEDURE — 99232 SBSQ HOSP IP/OBS MODERATE 35: CPT | Mod: GC

## 2022-11-12 PROCEDURE — 99233 SBSQ HOSP IP/OBS HIGH 50: CPT

## 2022-11-12 RX ORDER — SODIUM CHLORIDE 9 MG/ML
1000 INJECTION INTRAMUSCULAR; INTRAVENOUS; SUBCUTANEOUS
Refills: 0 | Status: DISCONTINUED | OUTPATIENT
Start: 2022-11-12 | End: 2022-11-13

## 2022-11-12 RX ADMIN — SODIUM CHLORIDE 100 MILLILITER(S): 9 INJECTION INTRAMUSCULAR; INTRAVENOUS; SUBCUTANEOUS at 08:39

## 2022-11-12 RX ADMIN — PANTOPRAZOLE SODIUM 40 MILLIGRAM(S): 20 TABLET, DELAYED RELEASE ORAL at 17:52

## 2022-11-12 RX ADMIN — PANTOPRAZOLE SODIUM 40 MILLIGRAM(S): 20 TABLET, DELAYED RELEASE ORAL at 06:08

## 2022-11-12 NOTE — PROGRESS NOTE ADULT - SUBJECTIVE AND OBJECTIVE BOX
Patient is a 52y old  Male who presents with a chief complaint of GI bleed (11 Nov 2022 08:59)     INTERVAL HPI/OVERNIGHT EVENTS:  - No acute events overnight    SUBJECTIVE  - Patient seen and evaluated at bedside  - Patient reports presence of   - Patient reports absence of fevers, chills, HA, lightheadedness, dizziness, nausea, emesis, chest pain, dyspnea, palpitations, abd pain, diarrhea, urinary symptoms, skin color changes or rashes, or LE edema     MEDICATIONS  (STANDING):  influenza   Vaccine 0.5 milliLiter(s) IntraMuscular once  pantoprazole  Injectable 40 milliGRAM(s) IV Push two times a day  sodium chloride 0.9%. 1000 milliLiter(s) (100 mL/Hr) IV Continuous <Continuous>  sodium chloride 0.9%. 1000 milliLiter(s) (100 mL/Hr) IV Continuous <Continuous>    MEDICATIONS  (PRN):        REVIEW OF SYSTEMS: As indicated above; otherwise, negative    VITAL SIGNS:  T(F): 98.1 (11-12-22 @ 06:05), Max: 98.3 (11-11-22 @ 20:13)  HR: 91 (11-12-22 @ 06:05) (91 - 120)  BP: 116/81 (11-12-22 @ 06:05) (113/73 - 126/80)  RR: 18 (11-12-22 @ 06:05) (18 - 18)  SpO2: 96% (11-12-22 @ 06:05) (96% - 97%)    PHYSICAL EXAM:  General: NAD, well-groomed, well-developed  Eyes: Conjunctiva and sclera clear  ENMT: Moist mucous membranes  Neck: No palpable pre-auricular, post-auricular, occipital, mandibular, submental, supra-clavicular, or infra-clavicular lymph nodes   Chest: Clear to auscultation bilaterally; no rales, rhonchi, or wheezing  Heart: Regular rate and rhythm; normal S1 and S2; no murmurs, rubs, or gallops  Abd: Soft, nontender, nondistended  Nervous System: AAOX3  Psych: Appropriate affect  Ext: no peripheral LE edema bilaterally    LABS:                        9.1    8.21  )-----------( 246      ( 12 Nov 2022 00:14 )             27.0     11 Nov 2022 11:13    140    |  106    |  15     ----------------------------<  161    3.9     |  21     |  0.77     Ca    8.5        11 Nov 2022 11:13  Phos  1.7       11 Nov 2022 11:13  Mg     1.8       11 Nov 2022 11:13    TPro  6.3    /  Alb  3.7    /  TBili  0.4    /  DBili  x      /  AST  33     /  ALT  24     /  AlkPhos  47     11 Nov 2022 11:13    CAPILLARY BLOOD GLUCOSE      POCT Blood Glucose.: 187 mg/dL (11 Nov 2022 10:30)    BLOOD CULTURE    RADIOLOGY & ADDITIONAL TESTS:    Imaging Personally Reviewed:  [X ] YES     Consultant(s) Notes Reviewed:  Yes    Care Discussed with Consultants/Other Providers: Yes Patient is a 52y old  Male who presents with a chief complaint of GI bleed (12 Nov 2022 07:08)     INTERVAL HPI/OVERNIGHT EVENTS:  - No acute events overnight    SUBJECTIVE  - Patient seen and evaluated at bedside  - Patient reports presence of   - Patient reports absence of fevers, chills, HA, lightheadedness, dizziness, nausea, emesis, chest pain, dyspnea, palpitations, abd pain, diarrhea, urinary symptoms, skin color changes or rashes, or LE edema     MEDICATIONS  (STANDING):  influenza   Vaccine 0.5 milliLiter(s) IntraMuscular once  pantoprazole  Injectable 40 milliGRAM(s) IV Push two times a day  sodium chloride 0.9%. 1000 milliLiter(s) (100 mL/Hr) IV Continuous <Continuous>  sodium chloride 0.9%. 1000 milliLiter(s) (100 mL/Hr) IV Continuous <Continuous>    MEDICATIONS  (PRN):        REVIEW OF SYSTEMS: As indicated above; otherwise, negative    VITAL SIGNS:  T(F): 98.1 (11-12-22 @ 06:05), Max: 98.3 (11-11-22 @ 20:13)  HR: 91 (11-12-22 @ 06:05) (91 - 120)  BP: 116/81 (11-12-22 @ 06:05) (113/73 - 126/80)  RR: 18 (11-12-22 @ 06:05) (18 - 18)  SpO2: 96% (11-12-22 @ 06:05) (96% - 97%)    PHYSICAL EXAM:  General: NAD, well-groomed, well-developed  Eyes: Conjunctiva and sclera clear  ENMT: Moist mucous membranes  Neck: No palpable pre-auricular, post-auricular, occipital, mandibular, submental, supra-clavicular, or infra-clavicular lymph nodes   Chest: Clear to auscultation bilaterally; no rales, rhonchi, or wheezing  Heart: Regular rate and rhythm; normal S1 and S2; no murmurs, rubs, or gallops  Abd: Soft, nontender, nondistended  Nervous System: AAOX3  Psych: Appropriate affect  Ext: no peripheral LE edema bilaterally    LABS:                        8.4    6.86  )-----------( 226      ( 12 Nov 2022 06:59 )             24.8     12 Nov 2022 06:59    140    |  106    |  12     ----------------------------<  107    4.0     |  24     |  0.80     Ca    8.6        12 Nov 2022 06:59  Phos  2.7       12 Nov 2022 06:59  Mg     1.9       12 Nov 2022 06:59    TPro  5.9    /  Alb  3.6    /  TBili  0.4    /  DBili  x      /  AST  23     /  ALT  20     /  AlkPhos  41     12 Nov 2022 06:59    CAPILLARY BLOOD GLUCOSE      POCT Blood Glucose.: 187 mg/dL (11 Nov 2022 10:30)    BLOOD CULTURE    RADIOLOGY & ADDITIONAL TESTS:    Imaging Personally Reviewed:  [X ] YES     Consultant(s) Notes Reviewed:  Yes    Care Discussed with Consultants/Other Providers: Yes Patient is a 52y old  Male who presents with a chief complaint of GI bleed (12 Nov 2022 07:08)     INTERVAL HPI/OVERNIGHT EVENTS:  - No acute events overnight    SUBJECTIVE  - Patient seen and evaluated at bedside  - Patient reports presence of 2 bloody BM with the second one having coffee grounds and darkish red blood   - Patient reports absence of fevers, lightheadedness, dizziness, nausea, emesis, chest pain, dyspnea, palpitations, abd pain, urinary symptoms, or LE edema     MEDICATIONS  (STANDING):  influenza   Vaccine 0.5 milliLiter(s) IntraMuscular once  pantoprazole  Injectable 40 milliGRAM(s) IV Push two times a day  sodium chloride 0.9%. 1000 milliLiter(s) (100 mL/Hr) IV Continuous <Continuous>  sodium chloride 0.9%. 1000 milliLiter(s) (100 mL/Hr) IV Continuous <Continuous>    MEDICATIONS  (PRN):        REVIEW OF SYSTEMS: As indicated above; otherwise, negative    VITAL SIGNS:  T(F): 98.1 (11-12-22 @ 06:05), Max: 98.3 (11-11-22 @ 20:13)  HR: 91 (11-12-22 @ 06:05) (91 - 120)  BP: 116/81 (11-12-22 @ 06:05) (113/73 - 126/80)  RR: 18 (11-12-22 @ 06:05) (18 - 18)  SpO2: 96% (11-12-22 @ 06:05) (96% - 97%)    PHYSICAL EXAM:  General: NAD; obese habitus  Eyes: Sclera clear  Neck: No palpable pre-auricular, post-auricular, occipital, mandibular, submental, supra-clavicular, or infra-clavicular lymph nodes   Chest: Clear to auscultation bilaterally; no rales, rhonchi, or wheezing  Heart: borderline tachycardic rate and rhythm; intact S1 and S2; no obvious murmurs, rubs, or gallops  Abd: Soft, nontender, nondistended  Nervous System: Alert and oriented to situation  Psych: Appropriate affect  Ext: no peripheral LE edema bilaterally    LABS:                        8.4    6.86  )-----------( 226      ( 12 Nov 2022 06:59 )             24.8     12 Nov 2022 06:59    140    |  106    |  12     ----------------------------<  107    4.0     |  24     |  0.80     Ca    8.6        12 Nov 2022 06:59  Phos  2.7       12 Nov 2022 06:59  Mg     1.9       12 Nov 2022 06:59    TPro  5.9    /  Alb  3.6    /  TBili  0.4    /  DBili  x      /  AST  23     /  ALT  20     /  AlkPhos  41     12 Nov 2022 06:59    CAPILLARY BLOOD GLUCOSE      POCT Blood Glucose.: 187 mg/dL (11 Nov 2022 10:30)    BLOOD CULTURE    RADIOLOGY & ADDITIONAL TESTS:    Imaging Personally Reviewed:  [X ] YES     Consultant(s) Notes Reviewed:  Yes    Care Discussed with Consultants/Other Providers: Yes

## 2022-11-12 NOTE — PROGRESS NOTE ADULT - PROBLEM SELECTOR PLAN 1
Patient with hematochezia as well as melena concerning for brisk upper GI bleed vs lower GI bleed and likley diverticular bleed given hx of diverticulosis. Ddx: angioectasia, colonic mass (less likely given colonoscopy results from 2 years ago), ulcer, anal fissure.   - Rectal exam in the ED with dark stool, fecal occult positive.   - Hgb 9.5 from 12.1// Hgb 13.5 in 6/2022  - Rapid called 11/11 due to hypotension, tachycardia, and lightheadness. Pt. was given 1 pRBCs and fluid bolus, is now normotensive.   - Started Protonix 40IV BID  - Check CBC q6hrs, transfuse for Hgb <7  - Maintain active T&S  - keep NPO for now  -Bariatric surgery team consulted and recs appreciated  - GI does not recommend scope at this time, CTA recommended if patient does not clinically improve Patient with hematochezia as well as melena concerning for brisk upper GI bleed vs lower GI bleed and likley diverticular bleed given hx of diverticulosis. Ddx: angioectasia, colonic mass (less likely given colonoscopy results from 2 years ago), ulcer, anal fissure.   - Rectal exam in the ED with dark stool, fecal occult positive.   - Hgb 9.5 from 12.1// Hgb 13.5 in 6/2022 -->  - Rapid called 11/11 due to hypotension, tachycardia, and lightheadness. Pt. was given 1 pRBCs and fluid bolus, is now normotensive. Hgb 10 post transfusion --> today 9.1   - c/w Protonix 40IV BID  - Check CBC q6hrs, transfuse for Hgb <7  - Maintain active T&S  - advanced diet to clears and monitor for furthe rbloody bowel movements  - Bariatric surgery team consulted and recs appreciated  - GI does not recommend scope at this time,   - CTA A/P indicates no active bleeding but shows likely diluted blood products in rectal vault  - Continue to monitor and trend Hgb Patient with hematochezia as well as melena concerning for brisk upper GI bleed vs lower GI bleed and likley diverticular bleed given hx of diverticulosis. Ddx: angioectasia, colonic mass (less likely given colonoscopy results from 2 years ago), ulcer, anal fissure.   - Rectal exam in the ED with dark stool, fecal occult positive.   - Hgb 9.5 from 12.1// Hgb 13.5 in 6/2022  - Rapid called 11/11 due to hypotension, tachycardia, and lightheadness. Pt. was given 1 pRBCs and fluid bolus, is now normotensive. Hgb 10 post transfusion --> today 9.1 --> 8.4 i.s.o bloody BM x2  - Will follow up repeat CBC at 10AM and if continues to downtrend will transfuse with blood and continue to monitor  - c/w Protonix 40IV BID  - Check CBC q6hrs, transfuse for Hgb <7  - Maintain active T&S  - Maintain NPO and monitor for further bloody bowel movements  - Bariatric surgery team consulted and recs appreciated  - GI does not recommend scope at this time,   - CTA A/P indicates no active bleeding but shows likely diluted blood products in rectal vault  - Continue to monitor and trend Hgb Patient with hematochezia as well as melena concerning for brisk upper GI bleed vs lower GI bleed and likley diverticular bleed given hx of diverticulosis. Ddx: angioectasia, colonic mass (less likely given colonoscopy results from 2 years ago), ulcer, anal fissure.   - Rectal exam in the ED with dark stool, fecal occult positive.   - Hgb 9.5 from 12.1// Hgb 13.5 in 6/2022  - Rapid called 11/11 due to hypotension, tachycardia, and lightheadness. Pt. was given 1 pRBCs and fluid bolus, is now normotensive. Hgb 10 post transfusion --> today 9.1 --> 8.4 i.s.o bloody BM x2  - Will follow up repeat CBC at 10AM and if continues to downtrend will transfuse with blood and continue to monitor  - c/w Protonix 40IV BID  - Check CBC q6hrs, transfuse for Hgb <7  - Maintain active T&S  - Advanced diet to clear liquids and monitor for further bloody bowel movements  - Bariatric surgery team consulted and recs appreciated  - GI does not recommend scope at this time,   - CTA A/P indicates no active bleeding but shows likely diluted blood products in rectal vault  - Continue to monitor and trend Hgb; IF downtrending and hemodynamically unstable despite Hgb >7 --> tranfuse

## 2022-11-12 NOTE — PROGRESS NOTE ADULT - ASSESSMENT
53yo M with PMH of diverticulosis c/b diverticulitis s/p bowel resection, lap band surgery, CAD on aspirin who presents with rectal bleeding x 1 day.    # Syncope - suspect vasovagal/ orthostatic given onset with change of position and quick return to baseline with no intervention. Hb overall stable.   # Rectal bleeding - in the setting of a recent colonoscopy with pan-diverticulosis. Suspect diverticulosis as the likely etiology. Low suspicion for malignancy given recent negative colonoscopy. Diverticular bleeds tend to self resolve without intervention and the role for a colonoscopy is usually a diagnostic one. Given recent reportedly negative colonoscopy (except reported diverticulosis) reasonable to defer from repeat colonoscopy at this time. Patient voiced preference to avoid a repeat colonoscopy as well.   - hgb continues to remain stable 9.1 --> 8.4 w/ no blood transfusion. Hold off on repeat colonoscopy for now.     Recommendations:  - Can start him on CLD.   - Hold off on colonoscopy for now.   - Monitor stool o/p.   - Monitor CBC Q12 hours.   - Trend CBC, transfuse for Hb>7, PLT>50    GI will continue to follow.     All recommendations are tentative until note is attested by an attending.     Mckinley Llanos, PGY-4  Gastroenterology/Hepatology Fellow  Available on [employer] Teams  [phone] (Short Range Pager)  [phone] (Long Range Pager)    After 5pm, please contact the on-call GI fellow. [phone]

## 2022-11-12 NOTE — PROGRESS NOTE ADULT - PROBLEM SELECTOR PLAN 4
Per outpatient records, pt. takes Alprazolam .5 mg as needed, currently pt. states he is not taking it Per outpatient records, pt. takes Alprazolam 0.5 mg as needed, currently pt. states he is not taking it

## 2022-11-12 NOTE — PROGRESS NOTE ADULT - ATTENDING COMMENTS
As above  53yo M with known diverticulosis c/b diverticulitis s/p bowel resection, lap band surgery, CAD on aspirin who presents with BRBPR  Bleeding appears to have slowed/stopped and Hgb stabilizing  Continue to trend Hgb and monitor BMs  No plans for repeat colonoscopy at this time  Can start CLD today, if continues to have no bleeding please advance diet    Thank you for this interesting consult.  Please call the advanced GI service with any questions or concerns.

## 2022-11-12 NOTE — PROGRESS NOTE ADULT - SUBJECTIVE AND OBJECTIVE BOX
Gastroenterology/Hepatology Progress Note      Interval Events:   Patient had one bright red BM with bloody clots at 11pm and another episode of dark stool later in the night. He denied abd pain, nausea, vomiting, fevers and chills. Reported that he has not eaten anything yet.     Allergies:  No Known Allergies      Hospital Medications:  influenza   Vaccine 0.5 milliLiter(s) IntraMuscular once  pantoprazole  Injectable 40 milliGRAM(s) IV Push two times a day  sodium chloride 0.9%. 1000 milliLiter(s) IV Continuous <Continuous>      ROS: 14 point ROS negative unless otherwise state in subjective    PHYSICAL EXAM:   Vital Signs:  Vital Signs Last 24 Hrs  T(C): 36.7 (12 Nov 2022 06:05), Max: 36.8 (11 Nov 2022 20:13)  T(F): 98.1 (12 Nov 2022 06:05), Max: 98.3 (11 Nov 2022 20:13)  HR: 91 (12 Nov 2022 06:05) (91 - 120)  BP: 116/81 (12 Nov 2022 06:05) (113/73 - 126/80)  BP(mean): --  RR: 18 (12 Nov 2022 06:05) (18 - 18)  SpO2: 96% (12 Nov 2022 06:05) (96% - 97%)    Parameters below as of 12 Nov 2022 06:05  Patient On (Oxygen Delivery Method): room air      Daily     Daily     GENERAL:  No acute distress, obese male, lying in bed.   HEENT:  Normocephalic/atraumatic, no scleral icterus  CHEST:  No accessory muscle use  ABDOMEN:  Soft, non-tender, non-distended  EXTREMITIES: No cyanosis, clubbing, or edema  SKIN:  No rash  NEURO:  Alert and oriented x 3    LABS:                        8.4    6.86  )-----------( 226      ( 12 Nov 2022 06:59 )             24.8     Mean Cell Volume: 89.9 fl (11-12-22 @ 06:59)    11-12    140  |  106  |  12  ----------------------------<  107<H>  4.0   |  24  |  0.80    Ca    8.6      12 Nov 2022 06:59  Phos  2.7     11-12  Mg     1.9     11-12    TPro  5.9<L>  /  Alb  3.6  /  TBili  0.4  /  DBili  x   /  AST  23  /  ALT  20  /  AlkPhos  41  11-12    LIVER FUNCTIONS - ( 12 Nov 2022 06:59 )  Alb: 3.6 g/dL / Pro: 5.9 g/dL / ALK PHOS: 41 U/L / ALT: 20 U/L / AST: 23 U/L / GGT: x                     Imaging:    CT abd and pelvis on 11/11      FINDINGS:  LOWER CHEST: Within normal limits.    LIVER: Within normal limits.  BILE DUCTS: Normal caliber.  GALLBLADDER: Within normal limits.  SPLEEN: Calcified granulomata..  PANCREAS: Within normal limits.  ADRENALS: Within normal limits.  KIDNEYS/URETERS: Within normal limits.    BLADDER: Within normal limits.  REPRODUCTIVE ORGANS: Prostate within normal limits.    BOWEL: Gastric band in appropriate orientation. Port in the left ventral   abdominal wall. Mild hyperdense fluid in the rectum. No evidence for   active GI bleed. Distal sigmoid anastomosis. No evidence for active bowel   inflammation. Diverticular disease of the descending colon. No bowel   obstruction. Appendix is normal.  PERITONEUM: No ascites.  VESSELS: Within normal limits.  RETROPERITONEUM/LYMPH NODES: No lymphadenopathy.  ABDOMINAL WALL: Ventral hernia repair with mesh. Small fat-containing   umbilical hernia.. Small fat-containing right inguinal hernia.  BONES: Degenerative changes.    IMPRESSION:  No evidence for active GI bleed or active bowel inflammation. Mild   hyperdense fluid in the rectum may represent diluted blood products.

## 2022-11-12 NOTE — PROGRESS NOTE ADULT - PROBLEM SELECTOR PLAN 5
Hold DVT prop in the context of GI bleed  Diet: NPO for now  Dispo: pending clinical improvement Hold DVT prop in the context of GI bleed  Diet: advanced to clear liquid diet   Dispo: pending clinical improvement Hold DVT prop in the context of GI bleed  Diet: clear liquid  Dispo: pending clinical improvement

## 2022-11-12 NOTE — PROGRESS NOTE ADULT - ATTENDING COMMENTS
52M with PMH of diverticulosis c/b diverticulitis s/p bowel resection, lap band surgery, CAD on aspirin, presents to the ED with one day of BRBPR.   Of note, patient admitted in March 2022 for similar presentation. Per chart review, during last admission had multiple episodes of BRBPR. Was evaluated by GI, Hgb and vitals had been stable during hospitalization and bleeding stopped in 24hr period. He had declined inpatient workup at the time and was advised to f/u outpatient with GI for EGD/colonscopy. Says he had completed the colonoscopy and reportedly unremarkable except for internal hemorrhoids. Had been doing well since yesterday around 2am when he experiencing lower abdominal cramping sensation and about 8-10 episodes of bloody bowel movements. Last episodes was in the ER around 7am. Has been on aspirin and NSAIDS for joint pain. Denies significant ETOH use. No fevers, chills, URI symptoms, CP, SOB, lightheadedness, dizziness.       #GIB likely lower GIB, diverticular bleed  #Acute blood loss anemia   CTAP: Nondilated rectum containing high attenuation fluid which may represent blood. No active extravasation of contrast into the bowel lumen to suggest source of GI bleed at CT. Hgb 12 ->10 -> 8.4 (currently stable at 8.6)  -Active type and screen; consented for blood tx, 2 large bore IVs  -IV PPI BID   -start CLD  -CBC q12h for now   -If he continues to have hematochezia, will plan for CTA with possible IR evaluation pending CT results

## 2022-11-13 ENCOUNTER — TRANSCRIPTION ENCOUNTER (OUTPATIENT)
Age: 52
End: 2022-11-13

## 2022-11-13 LAB
ALBUMIN SERPL ELPH-MCNC: 4.1 G/DL — SIGNIFICANT CHANGE UP (ref 3.3–5)
ALP SERPL-CCNC: 46 U/L — SIGNIFICANT CHANGE UP (ref 40–120)
ALT FLD-CCNC: 25 U/L — SIGNIFICANT CHANGE UP (ref 10–45)
ANION GAP SERPL CALC-SCNC: 12 MMOL/L — SIGNIFICANT CHANGE UP (ref 5–17)
AST SERPL-CCNC: 20 U/L — SIGNIFICANT CHANGE UP (ref 10–40)
BILIRUB SERPL-MCNC: 0.4 MG/DL — SIGNIFICANT CHANGE UP (ref 0.2–1.2)
BUN SERPL-MCNC: 10 MG/DL — SIGNIFICANT CHANGE UP (ref 7–23)
CALCIUM SERPL-MCNC: 9.1 MG/DL — SIGNIFICANT CHANGE UP (ref 8.4–10.5)
CHLORIDE SERPL-SCNC: 104 MMOL/L — SIGNIFICANT CHANGE UP (ref 96–108)
CO2 SERPL-SCNC: 24 MMOL/L — SIGNIFICANT CHANGE UP (ref 22–31)
CREAT SERPL-MCNC: 0.75 MG/DL — SIGNIFICANT CHANGE UP (ref 0.5–1.3)
EGFR: 107 ML/MIN/1.73M2 — SIGNIFICANT CHANGE UP
GLUCOSE SERPL-MCNC: 112 MG/DL — HIGH (ref 70–99)
HCT VFR BLD CALC: 23 % — LOW (ref 39–50)
HCT VFR BLD CALC: 26.7 % — LOW (ref 39–50)
HGB BLD-MCNC: 7.7 G/DL — LOW (ref 13–17)
HGB BLD-MCNC: 9 G/DL — LOW (ref 13–17)
MAGNESIUM SERPL-MCNC: 2 MG/DL — SIGNIFICANT CHANGE UP (ref 1.6–2.6)
MCHC RBC-ENTMCNC: 30.3 PG — SIGNIFICANT CHANGE UP (ref 27–34)
MCHC RBC-ENTMCNC: 30.4 PG — SIGNIFICANT CHANGE UP (ref 27–34)
MCHC RBC-ENTMCNC: 33.5 GM/DL — SIGNIFICANT CHANGE UP (ref 32–36)
MCHC RBC-ENTMCNC: 33.7 GM/DL — SIGNIFICANT CHANGE UP (ref 32–36)
MCV RBC AUTO: 90.2 FL — SIGNIFICANT CHANGE UP (ref 80–100)
MCV RBC AUTO: 90.6 FL — SIGNIFICANT CHANGE UP (ref 80–100)
NRBC # BLD: 0 /100 WBCS — SIGNIFICANT CHANGE UP (ref 0–0)
NRBC # BLD: 0 /100 WBCS — SIGNIFICANT CHANGE UP (ref 0–0)
PHOSPHATE SERPL-MCNC: 3 MG/DL — SIGNIFICANT CHANGE UP (ref 2.5–4.5)
PLATELET # BLD AUTO: 230 K/UL — SIGNIFICANT CHANGE UP (ref 150–400)
PLATELET # BLD AUTO: 242 K/UL — SIGNIFICANT CHANGE UP (ref 150–400)
POTASSIUM SERPL-MCNC: 3.8 MMOL/L — SIGNIFICANT CHANGE UP (ref 3.5–5.3)
POTASSIUM SERPL-SCNC: 3.8 MMOL/L — SIGNIFICANT CHANGE UP (ref 3.5–5.3)
PROT SERPL-MCNC: 6.9 G/DL — SIGNIFICANT CHANGE UP (ref 6–8.3)
RBC # BLD: 2.54 M/UL — LOW (ref 4.2–5.8)
RBC # BLD: 2.96 M/UL — LOW (ref 4.2–5.8)
RBC # FLD: 12.9 % — SIGNIFICANT CHANGE UP (ref 10.3–14.5)
RBC # FLD: 13.2 % — SIGNIFICANT CHANGE UP (ref 10.3–14.5)
SODIUM SERPL-SCNC: 140 MMOL/L — SIGNIFICANT CHANGE UP (ref 135–145)
WBC # BLD: 6.55 K/UL — SIGNIFICANT CHANGE UP (ref 3.8–10.5)
WBC # BLD: 7.27 K/UL — SIGNIFICANT CHANGE UP (ref 3.8–10.5)
WBC # FLD AUTO: 6.55 K/UL — SIGNIFICANT CHANGE UP (ref 3.8–10.5)
WBC # FLD AUTO: 7.27 K/UL — SIGNIFICANT CHANGE UP (ref 3.8–10.5)

## 2022-11-13 PROCEDURE — 99232 SBSQ HOSP IP/OBS MODERATE 35: CPT

## 2022-11-13 PROCEDURE — 93010 ELECTROCARDIOGRAM REPORT: CPT

## 2022-11-13 PROCEDURE — 99232 SBSQ HOSP IP/OBS MODERATE 35: CPT | Mod: GC

## 2022-11-13 RX ORDER — METOPROLOL TARTRATE 50 MG
25 TABLET ORAL DAILY
Refills: 0 | Status: DISCONTINUED | OUTPATIENT
Start: 2022-11-13 | End: 2022-11-14

## 2022-11-13 RX ORDER — PANTOPRAZOLE SODIUM 20 MG/1
1 TABLET, DELAYED RELEASE ORAL
Qty: 90 | Refills: 0
Start: 2022-11-13 | End: 2023-02-10

## 2022-11-13 RX ORDER — METOPROLOL TARTRATE 50 MG
1 TABLET ORAL
Qty: 0 | Refills: 0 | DISCHARGE

## 2022-11-13 RX ORDER — ALPRAZOLAM 0.25 MG
1 TABLET ORAL
Qty: 0 | Refills: 0 | DISCHARGE

## 2022-11-13 RX ORDER — AMLODIPINE BESYLATE 2.5 MG/1
1 TABLET ORAL
Qty: 0 | Refills: 0 | DISCHARGE

## 2022-11-13 RX ORDER — PANTOPRAZOLE SODIUM 20 MG/1
40 TABLET, DELAYED RELEASE ORAL
Refills: 0 | Status: DISCONTINUED | OUTPATIENT
Start: 2022-11-13 | End: 2022-11-14

## 2022-11-13 RX ORDER — LOSARTAN POTASSIUM 100 MG/1
50 TABLET, FILM COATED ORAL DAILY
Refills: 0 | Status: DISCONTINUED | OUTPATIENT
Start: 2022-11-13 | End: 2022-11-14

## 2022-11-13 RX ORDER — LOSARTAN POTASSIUM 100 MG/1
1 TABLET, FILM COATED ORAL
Qty: 0 | Refills: 0 | DISCHARGE

## 2022-11-13 RX ORDER — ACETAMINOPHEN 500 MG
650 TABLET ORAL EVERY 6 HOURS
Refills: 0 | Status: DISCONTINUED | OUTPATIENT
Start: 2022-11-13 | End: 2022-11-14

## 2022-11-13 RX ADMIN — PANTOPRAZOLE SODIUM 40 MILLIGRAM(S): 20 TABLET, DELAYED RELEASE ORAL at 17:07

## 2022-11-13 RX ADMIN — LOSARTAN POTASSIUM 50 MILLIGRAM(S): 100 TABLET, FILM COATED ORAL at 20:59

## 2022-11-13 RX ADMIN — PANTOPRAZOLE SODIUM 40 MILLIGRAM(S): 20 TABLET, DELAYED RELEASE ORAL at 05:48

## 2022-11-13 RX ADMIN — Medication 25 MILLIGRAM(S): at 20:59

## 2022-11-13 NOTE — DISCHARGE NOTE PROVIDER - NSDCMRMEDTOKEN_GEN_ALL_CORE_FT
amLODIPine 5 mg oral tablet: 1 tab(s) orally once a day  atorvastatin 10 mg oral tablet: 1 tab(s) orally once a day  losartan 50 mg oral tablet: 1 tab(s) orally once a day  metoprolol succinate 25 mg oral tablet, extended release: 1 tab(s) orally once a day  Xanax 0.5 mg oral tablet: 1 tab(s) orally 3 times a day, As Needed   atorvastatin 10 mg oral tablet: 1 tab(s) orally once a day  pantoprazole 40 mg oral delayed release tablet: 1 tab(s) orally once a day (before a meal)   atorvastatin 10 mg oral tablet: 1 tab(s) orally once a day  losartan 50 mg oral tablet: 1 tab(s) orally once a day  metoprolol succinate 25 mg oral tablet, extended release: 1 tab(s) orally once a day

## 2022-11-13 NOTE — DISCHARGE NOTE PROVIDER - NSDCCPTREATMENT_GEN_ALL_CORE_FT
PRINCIPAL PROCEDURE  Procedure: CT scan abdomen  Findings and Treatment:   LIVER: Within normal limits.  BILE DUCTS: Normal caliber.  GALLBLADDER: Within normal limits.  SPLEEN: Calcified granulomata..  PANCREAS: Within normal limits.  ADRENALS: Within normal limits.  KIDNEYS/URETERS: Within normal limits.  BLADDER: Within normal limits.  REPRODUCTIVE ORGANS: Prostate within normal limits.  BOWEL: Gastric band in appropriate orientation. Port in the left ventral   abdominal wall. Mild hyperdense fluid in the rectum. No evidence for   active GI bleed. Distal sigmoid anastomosis. No evidence for active bowel   inflammation. Diverticular disease of the descending colon. No bowel   obstruction. Appendix is normal.  PERITONEUM: No ascites.  VESSELS: Within normal limits.  RETROPERITONEUM/LYMPH NODES: No lymphadenopathy.  ABDOMINAL WALL: Ventral hernia repair with mesh. Small fat-containing   umbilical hernia.. Small fat-containing right inguinal hernia.  BONES: Degenerative changes.  IMPRESSION:  No evidence for active GI bleed or active bowel inflammation. Mild   hyperdense fluid in the rectum may represent diluted blood products.< from: CT Abdomen and Pelvis w/ IV Cont (11.11.22 @ 22:05) >

## 2022-11-13 NOTE — PROGRESS NOTE ADULT - ATTENDING COMMENTS
52 M with diverticulosis c/b diverticulitis s/p bowel resection, lap band surgery, CAD on aspirin who presents with recurrent hematochezia  No further lower GI bleeding and Hgb has been stable  No plans for repeat colonoscopy at this time   Ok for discharge from GI standpoint - should have outpatient GI followup   Diet as tolerated    Thank you for this interesting consult.  Please call the advanced GI service with any questions or concerns.

## 2022-11-13 NOTE — PROGRESS NOTE ADULT - ASSESSMENT
51yo M with PMH of diverticulosis c/b diverticulitis s/p bowel resection, lap band surgery, CAD on aspirin who presents with rectal bleeding x 1 day.    # Syncope - suspect vasovagal/ orthostatic given onset with change of position and quick return to baseline with no intervention. Hb overall stable.   # Rectal bleeding - in the setting of a recent colonoscopy with pan-diverticulosis. Suspect diverticulosis as the likely etiology. Low suspicion for malignancy given recent negative colonoscopy. Diverticular bleeds tend to self resolve without intervention and the role for a colonoscopy is usually a diagnostic one. Given recent reportedly negative colonoscopy (except reported diverticulosis) reasonable to defer from repeat colonoscopy at this time. Patient voiced preference to avoid a repeat colonoscopy as well.   - hgb continues to remain stable at 9, no bloody BM since yesterday. Can advance diet.     Recommendations:  - advance diet as tolerated.   - Hold off on colonoscopy for now.   - Monitor stool o/p.   - Trend CBC, transfuse for Hb>7, PLT>50  - No further GI interventions for now.   - Patient can follow up with GI as o/p.     Thank you for the consult. Please call us back if you have any questions    All recommendations are tentative until note is attested by an attending.     Mckinley Llanos, PGY-4  Gastroenterology/Hepatology Fellow  Available on Microsoft Teams  [phone] (Short Range Pager)  [phone] (Long Range Pager)    After 5pm, please contact the on-call GI fellow. [phone]

## 2022-11-13 NOTE — PROGRESS NOTE ADULT - PROBLEM SELECTOR PLAN 1
Patient with hematochezia as well as melena concerning for brisk upper GI bleed vs lower GI bleed and likley diverticular bleed given hx of diverticulosis. Ddx: angioectasia, colonic mass (less likely given colonoscopy results from 2 years ago), ulcer, anal fissure.   - Rectal exam in the ED with dark stool, fecal occult positive.   - Hgb 9.5 from 12.1// Hgb 13.5 in 6/2022  - Rapid called 11/11 due to hypotension, tachycardia, and lightheadness. Pt. was given 1 pRBCs and fluid bolus, is now normotensive. Hgb 10 post transfusion --> today 9.1 --> 8.4 i.s.o bloody BM x2  - Will follow up repeat CBC at 10AM and if continues to downtrend will transfuse with blood and continue to monitor  - c/w Protonix 40IV BID  - Check CBC q6hrs, transfuse for Hgb <7  - Maintain active T&S  - Advanced diet to clear liquids and monitor for further bloody bowel movements  - Bariatric surgery team consulted and recs appreciated  - GI does not recommend scope at this time,   - CTA A/P indicates no active bleeding but shows likely diluted blood products in rectal vault  - Continue to monitor and trend Hgb; IF downtrending and hemodynamically unstable despite Hgb >7 --> tranfuse Patient with hematochezia as well as melena concerning for brisk upper GI bleed vs lower GI bleed and likley diverticular bleed given hx of diverticulosis. Ddx: angioectasia, colonic mass (less likely given colonoscopy results from 2 years ago), ulcer, anal fissure.   Rectal exam in the ED with dark stool, fecal occult positive.   Hgb 9.5 from 12.1// Hgb 13.5 in 6/2022  Rapid called 11/11 due to hypotension, tachycardia, and lightheadness. Pt. was given 1 pRBCs and fluid bolus, is now normotensive. Hgb 10 post transfusion --> today 9.1 --> 8.4 i.s.o bloody BM x2  GI does not recommend scope at this time, given recent colonoscopy about 1 mo ago   - c/w Protonix 40IV BID  - Check CBC q12hrs, transfuse for Hgb <7  - Maintain active T&S  - Advanced diet to clear liquids and monitor for further bloody bowel movements  - Bariatric surgery team consulted and recs appreciated  - CTA A/P 11/11 indicates no active bleeding but shows likely diluted blood products in rectal vault  - Continue to monitor and trend Hgb; IF downtrending and hemodynamically unstable despite Hgb >7 --> tranfuse Patient with hematochezia as well as melena concerning for brisk upper GI bleed vs lower GI bleed and likley diverticular bleed given hx of diverticulosis. Ddx: angioectasia, colonic mass (less likely given colonoscopy results from 2 years ago), ulcer, anal fissure.   Rectal exam in the ED with dark stool, fecal occult positive.   Hgb 9.5 from 12.1// Hgb 13.5 in 6/2022  Rapid called 11/11 due to hypotension, tachycardia, and lightheadness. Pt. was given 1 pRBCs and fluid bolus, is now normotensive. Hgb 10 post transfusion --> today 9.1 --> 8.4 i.s.o bloody BM x2  GI does not recommend scope at this time, given recent colonoscopy about 1 mo ago   CTA A/P 11/11 indicates no active bleeding but shows likely diluted blood products in rectal vault  - protonix 40mg PO daily   - Check CBC q12hrs, transfuse for Hgb <7  - Maintain active T&S  - Advanced diet to regular and monitor for further bloody bowel movements  - Bariatric surgery team consulted and recs appreciated  - Continue to monitor and trend Hgb; IF downtrending and hemodynamically unstable despite Hgb >7 --> tranfuse

## 2022-11-13 NOTE — DISCHARGE NOTE PROVIDER - HOSPITAL COURSE
52 year-old male PMH diverticulosis with bleeding x2, s/p bowel resection for diverticulitis, lap band procedure, ext and int hemorrhoids, colitis, small bowel ulcer, lap band procedure, CAD on ASA 81 qd, on meloxicam for joint pain for the past month presenting acutely with BRBPR GI bleeding attributed to LGIB from divertuculi. Protonix IV started and 2 large bore IVs placed. Significant Hgb drop from 12 to 10 to 9 with 4x bloody BM. Vitals initially stable, however, RRT called for hypotension (SBP in the 80s) 2/2 bleeding 11/11. Given 1L IVF, then received 2U pRBCs without appropriate increase in Hgb (9.5 to 10). CTA A/P x2 without source of active bleeding, noted blood in rectum. GI consulted and without plans for colonoscopy given recent scope about a month ago with pan-diverticulosis and no malignancy. Blood pressure and Hgb currently stable at 8-9. Diet advanced to regular and now having residual melena.     Patient without PT needs and ambulatory.... 52 year-old male PMH diverticulosis with bleeding x2, s/p bowel resection for diverticulitis, lap band procedure, ext and int hemorrhoids, colitis, small bowel ulcer, lap band procedure, CAD on ASA 81 qd, on meloxicam for joint pain for the past month presenting acutely with BRBPR GI bleeding attributed to LGIB from divertuculi. Protonix IV started and 2 large bore IVs placed. Significant Hgb drop from 12 to 10 to 9 with 4x bloody BM. Vitals initially stable, however, RRT called for hypotension (SBP in the 80s) 2/2 bleeding 11/11. Given 1L IVF, then received 2U pRBCs without appropriate increase in Hgb (9.5 to 10). CTA A/P x2 without source of active bleeding, noted blood in rectum. GI consulted and without plans for colonoscopy given recent scope about a month ago with pan-diverticulosis and no malignancy. Blood pressure and Hgb currently stable at 8-9. Diet advanced to regular and now having residual melena.   Patient without PT needs and ambulatory. Pt stable for discharge with outpatient follow up. Aspirin and amlodipine held on discharge, pt to resume with PCP after resolution of symptoms. 52 year-old male PMH diverticulosis with bleeding x2, s/p bowel resection for diverticulitis, lap band procedure, ext and int hemorrhoids, colitis, small bowel ulcer, lap band procedure, CAD on ASA 81 qd, on meloxicam for joint pain for the past month presenting acutely with BRBPR GI bleeding attributed to LGIB from divertuculi. Protonix IV started and 2 large bore IVs placed. Significant Hgb drop from 12 to 10 to 9 with 4x bloody BM. Vitals initially stable, however, RRT called for hypotension (SBP in the 80s) 2/2 bleeding 11/11. Given 1L IVF, then received 2U pRBCs without appropriate increase in Hgb (9.5 to 10). CTA A/P x2 without source of active bleeding, noted blood in rectum. GI consulted and without plans for colonoscopy given recent scope about a month ago with pan-diverticulosis and no malignancy. Blood pressure and Hgb currently stable at 8-9. Diet advanced to regular and now having residual melena.     GI bleed likely diverticular bleed precipitated by NSAIDs. No stable.   Patient without PT needs and ambulatory. Pt stable for discharge with outpatient follow up. Aspirin and amlodipine held on discharge, pt to resume with PCP after resolution of symptoms.

## 2022-11-13 NOTE — DISCHARGE NOTE PROVIDER - CARE PROVIDER_API CALL
Ginger Morrison)  [facility]  [address] [place] [address]  Phone: [phone]  Fax: [phone]  Established Patient  Follow Up Time: 1 week    Tyler Scott; FACP)  Gastroenterology; Internal Medicine  [address], [address]  [place] [address]  Phone: [phone]  Fax: [phone]  Established Patient  Follow Up Time: 1 week

## 2022-11-13 NOTE — DISCHARGE NOTE PROVIDER - CARE PROVIDERS DIRECT ADDRESSES
[email or web address]

## 2022-11-13 NOTE — CHART NOTE - NSCHARTNOTEFT_GEN_A_CORE
Called by RN to assess for tachycardia. No fevers, chills, nausea, vomiting. Patient denies any further GIB, not feeling lightheaded or dizzy. Patient states that at home he is on a BB which has been held during this hospitalization i/s/o GIB and possible hypotension, in addition patient endorsed feeling stressed being away from home during hospital stay. Will order EKG, stat CBC to evaluate for need for transfusion. Will also restart patient's home ARB and BB. PE otherwise unremarkable. Plan discussed with team attending.    Jorge Snell M.D.  Internal Medicine PGY-3  [phone]

## 2022-11-13 NOTE — PROGRESS NOTE ADULT - SUBJECTIVE AND OBJECTIVE BOX
Gastroenterology/Hepatology Progress Note      Interval Events:     No bloody BM since yesterday. Patient denied abd pain, nausea, vomiting, tolerating po diet well.     Allergies:  No Known Allergies      Hospital Medications:  acetaminophen     Tablet .. 650 milliGRAM(s) Oral every 6 hours PRN  influenza   Vaccine 0.5 milliLiter(s) IntraMuscular once  pantoprazole    Tablet 40 milliGRAM(s) Oral before breakfast      ROS: 14 point ROS negative unless otherwise state in subjective    PHYSICAL EXAM:   Vital Signs:  Vital Signs Last 24 Hrs  T(C): 36.5 (13 Nov 2022 04:39), Max: 36.8 (12 Nov 2022 13:00)  T(F): 97.7 (13 Nov 2022 04:39), Max: 98.2 (12 Nov 2022 13:00)  HR: 85 (13 Nov 2022 04:39) (85 - 97)  BP: 123/80 (13 Nov 2022 04:39) (115/78 - 126/84)  BP(mean): --  RR: 17 (13 Nov 2022 04:39) (17 - 18)  SpO2: 99% (13 Nov 2022 04:39) (96% - 99%)    Parameters below as of 13 Nov 2022 04:39  Patient On (Oxygen Delivery Method): room air      Daily     Daily     GENERAL:  No acute distress, obese male, lying in bed.   HEENT:  Normocephalic/atraumatic, no scleral icterus  CHEST:  No accessory muscle use  ABDOMEN:  Soft, non-tender, non-distended  EXTREMITIES: No cyanosis, clubbing, or edema  SKIN:  No rash  NEURO:  Alert and oriented x 3    LABS:                        9.0    6.55  )-----------( 242      ( 13 Nov 2022 09:35 )             26.7     Mean Cell Volume: 90.2 fl (11-13-22 @ 09:35)    11-13    140  |  104  |  10  ----------------------------<  112<H>  3.8   |  24  |  0.75    Ca    9.1      13 Nov 2022 09:36  Phos  3.0     11-13  Mg     2.0     11-13    TPro  6.9  /  Alb  4.1  /  TBili  0.4  /  DBili  x   /  AST  20  /  ALT  25  /  AlkPhos  46  11-13    LIVER FUNCTIONS - ( 13 Nov 2022 09:36 )  Alb: 4.1 g/dL / Pro: 6.9 g/dL / ALK PHOS: 46 U/L / ALT: 25 U/L / AST: 20 U/L / GGT: x                     Imaging:      CT abd and pelvis on 11/11      FINDINGS:  LOWER CHEST: Within normal limits.    LIVER: Within normal limits.  BILE DUCTS: Normal caliber.  GALLBLADDER: Within normal limits.  SPLEEN: Calcified granulomata..  PANCREAS: Within normal limits.  ADRENALS: Within normal limits.  KIDNEYS/URETERS: Within normal limits.    BLADDER: Within normal limits.  REPRODUCTIVE ORGANS: Prostate within normal limits.    BOWEL: Gastric band in appropriate orientation. Port in the left ventral   abdominal wall. Mild hyperdense fluid in the rectum. No evidence for   active GI bleed. Distal sigmoid anastomosis. No evidence for active bowel   inflammation. Diverticular disease of the descending colon. No bowel   obstruction. Appendix is normal.  PERITONEUM: No ascites.  VESSELS: Within normal limits.  RETROPERITONEUM/LYMPH NODES: No lymphadenopathy.  ABDOMINAL WALL: Ventral hernia repair with mesh. Small fat-containing   umbilical hernia.. Small fat-containing right inguinal hernia.  BONES: Degenerative changes.    IMPRESSION:  No evidence for active GI bleed or active bowel inflammation. Mild   hyperdense fluid in the rectum may represent diluted blood products.

## 2022-11-13 NOTE — PROGRESS NOTE ADULT - PROBLEM SELECTOR PLAN 5
Hold DVT prop in the context of GI bleed  Diet: clear liquid  Dispo: pending clinical improvement Hold DVT prop in the context of GI bleed  Diet: regular  Dispo: pending clinical improvement

## 2022-11-13 NOTE — DISCHARGE NOTE PROVIDER - NSDCCPCAREPLAN_GEN_ALL_CORE_FT
PRINCIPAL DISCHARGE DIAGNOSIS  Diagnosis: Rectal bleed  Assessment and Plan of Treatment: You presented to the hospital with multiple episodes of bright bloody BM. This was likely coming from your known diverticulosis. Your hemoglobin level dropped during your hospitalization and you had low blood pressure as a result of the bleeding. You were started on pantoprazole to decrease acid and promote clotting in the GI tract. You had 2 CT scans of the abdomen which did not showing source of active bleeding and you were given blood transfusions for the low hemoglobin. Please continue to take pantoprazole when you leave the hospital and eat food as tolerated at home. If you have symptoms of lightheadedness, palpitations, increased blood bowel movements, abdominal pain please seek medical attention.      SECONDARY DISCHARGE DIAGNOSES  Diagnosis: Hypertension  Assessment and Plan of Treatment: You medications for high blood pressure were held during this hospitalization as they were lower from the GI bleed. Please continue to hold these medications and home and see your primary care doctor before resuming them     PRINCIPAL DISCHARGE DIAGNOSIS  Diagnosis: Rectal bleed  Assessment and Plan of Treatment: You presented to the hospital with multiple episodes of bright bloody BM. This was likely coming from your known diverticulosis. Your hemoglobin level dropped during your hospitalization and you had low blood pressure as a result of the bleeding. You were started on pantoprazole to decrease acid and promote clotting in the GI tract. You had 2 CT scans of the abdomen which did not showing source of active bleeding and you were given blood transfusions for the low hemoglobin.  Please continue to HOLD your daily aspirin while you're still having residual dark stools. Please follow up with your doctor prior to resuming.    If you have symptoms of lightheadedness, palpitations, increased blood bowel movements, abdominal pain please seek medical attention.      SECONDARY DISCHARGE DIAGNOSES  Diagnosis: Hypertension  Assessment and Plan of Treatment: While you were in the hospital, we held your blood pressure medications to avoid your blood pressures dropping too low while you were having active bleeding. At this time, your symptoms have improved and we restarted your metoprolol and losartan. You may continue taking these but if you start noticing bright red blood again, please stop them. Your blood pressures are well controlled at this time, please DO NOT restart the amlodipine yet until advised to do so by your primary care doctor.

## 2022-11-13 NOTE — PROGRESS NOTE ADULT - ATTENDING COMMENTS
52M with PMH of diverticulosis c/b diverticulitis s/p bowel resection, lap band surgery, CAD on aspirin, presents to the ED with one day of BRBPR.   Of note, patient admitted in March 2022 for similar presentation. Per chart review, during last admission had multiple episodes of BRBPR. Was evaluated by GI, Hgb and vitals had been stable during hospitalization and bleeding stopped in 24hr period. He had declined inpatient workup at the time and was advised to f/u outpatient with GI for EGD/colonoscopy. Says he had completed the colonoscopy and reportedly unremarkable except for internal hemorrhoids. Had been doing well since yesterday around 2am when he experiencing lower abdominal cramping sensation and about 8-10 episodes of bloody bowel movements. Last episodes was in the ER around 7am. Has been on aspirin and NSAIDS for joint pain. Denies significant ETOH use. No fevers, chills, URI symptoms, CP, SOB, lightheadedness, dizziness.     #GIB likely lower GIB, diverticular bleed  #Acute blood loss anemia   CTAP: Nondilated rectum containing high attenuation fluid which may represent blood. No active extravasation of contrast into the bowel lumen to suggest source of GI bleed at CT. Hgb 12 ->10 -> 8.4, currently stable  -GI recs appreciated  -advance diet to regular diet  -protonix qd  -Active type and screen; consented for blood tx, 2 large bore IVs

## 2022-11-13 NOTE — PROGRESS NOTE ADULT - PROBLEM SELECTOR PLAN 4
Per outpatient records, pt. takes Alprazolam 0.5 mg as needed, currently pt. states he is not taking it

## 2022-11-13 NOTE — PROGRESS NOTE ADULT - SUBJECTIVE AND OBJECTIVE BOX
PATIENT: GLEN CAMACHO, MRN: [id number]    CHIEF COMPLAINT: Patient is a 52y old  Male who presents with a chief complaint of GI bleed (12 Nov 2022 10:57)      INTERVAL HISTORY/OVERNIGHT EVENTS: No overnight events. Follow up CBC 8.3.    REVIEW OF SYSTEMS:    Constitutional:     [ ] negative [ ] fevers [ ] chills [ ] weight loss [ ] weight gain  HEENT:                  [ ] negative [ ] dry eyes [ ] eye irritation [ ] postnasal drip [ ] nasal congestion  CV:                         [ ] negative  [ ] chest pain [ ] orthopnea [ ] palpitations [ ] murmur  Resp:                     [ ] negative [ ] cough [ ] shortness of breath [ ] dyspnea [ ] wheezing [ ] sputum [ ] hemoptysis  GI:                          [ ] negative [ ] nausea [ ] vomiting [ ] diarrhea [ ] constipation [ ] abd pain [ ] dysphagia   :                        [ ] negative [ ] dysuria [ ] nocturia [ ] hematuria [ ] increased urinary frequency  Musculoskeletal: [ ] negative [ ] back pain [ ] myalgias [ ] arthralgias [ ] fracture  Skin:                       [ ] negative [ ] rash [ ] itch  Neurological:        [ ] negative [ ] headache [ ] dizziness [ ] syncope [ ] weakness [ ] numbness  Psychiatric:           [ ] negative [ ] anxiety [ ] depression  Endocrine:            [ ] negative [ ] diabetes [ ] thyroid problem  Heme/Lymph:      [ ] negative [ ] anemia [ ] bleeding problem  Allergic/Immune: [ ] negative [ ] itchy eyes [ ] nasal discharge [ ] hives [ ] angioedema    [ ] All other systems negative  [ ] Unable to assess ROS because ________.    MEDICATIONS:  MEDICATIONS  (STANDING):  influenza   Vaccine 0.5 milliLiter(s) IntraMuscular once  pantoprazole  Injectable 40 milliGRAM(s) IV Push two times a day  sodium chloride 0.9%. 1000 milliLiter(s) (100 mL/Hr) IV Continuous <Continuous>    MEDICATIONS  (PRN):      ALLERGIES: Allergies    No Known Allergies    Intolerances        OBJECTIVE:  ICU Vital Signs Last 24 Hrs  T(C): 36.5 (13 Nov 2022 04:39), Max: 36.8 (12 Nov 2022 13:00)  T(F): 97.7 (13 Nov 2022 04:39), Max: 98.2 (12 Nov 2022 13:00)  HR: 85 (13 Nov 2022 04:39) (85 - 97)  BP: 123/80 (13 Nov 2022 04:39) (115/78 - 126/84)  BP(mean): --  ABP: --  ABP(mean): --  RR: 17 (13 Nov 2022 04:39) (17 - 18)  SpO2: 99% (13 Nov 2022 04:39) (96% - 99%)    O2 Parameters below as of 13 Nov 2022 04:39  Patient On (Oxygen Delivery Method): room air            Adult Advanced Hemodynamics Last 24 Hrs  CVP(mm Hg): --  CVP(cm H2O): --  CO: --  CI: --  PA: --  PA(mean): --  PCWP: --  SVR: --  SVRI: --  PVR: --  PVRI: --  CAPILLARY BLOOD GLUCOSE        CAPILLARY BLOOD GLUCOSE      POCT Blood Glucose.: 187 mg/dL (11 Nov 2022 10:30)    I&O's Summary    12 Nov 2022 07:01  -  13 Nov 2022 07:00  --------------------------------------------------------  IN: 240 mL / OUT: 0 mL / NET: 240 mL      Daily     Daily     PHYSICAL EXAMINATION:  General: NAD; obese habitus  Eyes: Sclera clear  Neck: No palpable pre-auricular, post-auricular, occipital, mandibular, submental, supra-clavicular, or infra-clavicular lymph nodes   Chest: Clear to auscultation bilaterally; no rales, rhonchi, or wheezing  Heart: borderline tachycardic rate and rhythm; intact S1 and S2; no obvious murmurs, rubs, or gallops  Abd: Soft, nontender, nondistended  Nervous System: Alert and oriented to situation  Psych: Appropriate affect  Ext: no peripheral LE edema bilaterally      LABS:                          8.3    6.86  )-----------( 221      ( 12 Nov 2022 18:47 )             25.2     11-12    140  |  106  |  12  ----------------------------<  107<H>  4.0   |  24  |  0.80    Ca    8.6      12 Nov 2022 06:59  Phos  2.7     11-12  Mg     1.9     11-12    TPro  5.9<L>  /  Alb  3.6  /  TBili  0.4  /  DBili  x   /  AST  23  /  ALT  20  /  AlkPhos  41  11-12    LIVER FUNCTIONS - ( 12 Nov 2022 06:59 )  Alb: 3.6 g/dL / Pro: 5.9 g/dL / ALK PHOS: 41 U/L / ALT: 20 U/L / AST: 23 U/L / GGT: x                       TELEMETRY:     EKG:     IMAGING:    PATIENT: GLEN CAMACHO, MRN: [id number]    CHIEF COMPLAINT: Patient is a 52y old  Male who presents with a chief complaint of GI bleed (12 Nov 2022 10:57)      INTERVAL HISTORY/OVERNIGHT EVENTS: No overnight events. Follow up CBC 8.3, patient has been tolerating clear liquid diet. Denies abdominal pain, palpitations, lightheadedness when walking around the hallway. Denies CP or shortness of breath. Patient had one BM last night around 7PM which was dark and slightly blood tinged but has not had BM since then. Has been passing gas frequently and at that time wiping with scant dark blood. Is now wiping without any dark fecal matter or blood.     REVIEW OF SYSTEMS:  [x ] All other ROS negative     MEDICATIONS:  MEDICATIONS  (STANDING):  influenza   Vaccine 0.5 milliLiter(s) IntraMuscular once  pantoprazole  Injectable 40 milliGRAM(s) IV Push two times a day  sodium chloride 0.9%. 1000 milliLiter(s) (100 mL/Hr) IV Continuous <Continuous>    MEDICATIONS  (PRN):      ALLERGIES: Allergies    No Known Allergies    Intolerances        OBJECTIVE:  ICU Vital Signs Last 24 Hrs  T(C): 36.5 (13 Nov 2022 04:39), Max: 36.8 (12 Nov 2022 13:00)  T(F): 97.7 (13 Nov 2022 04:39), Max: 98.2 (12 Nov 2022 13:00)  HR: 85 (13 Nov 2022 04:39) (85 - 97)  BP: 123/80 (13 Nov 2022 04:39) (115/78 - 126/84)  BP(mean): --  ABP: --  ABP(mean): --  RR: 17 (13 Nov 2022 04:39) (17 - 18)  SpO2: 99% (13 Nov 2022 04:39) (96% - 99%)    O2 Parameters below as of 13 Nov 2022 04:39  Patient On (Oxygen Delivery Method): room air            Adult Advanced Hemodynamics Last 24 Hrs  CVP(mm Hg): --  CVP(cm H2O): --  CO: --  CI: --  PA: --  PA(mean): --  PCWP: --  SVR: --  SVRI: --  PVR: --  PVRI: --  CAPILLARY BLOOD GLUCOSE        CAPILLARY BLOOD GLUCOSE      POCT Blood Glucose.: 187 mg/dL (11 Nov 2022 10:30)    I&O's Summary    12 Nov 2022 07:01  -  13 Nov 2022 07:00  --------------------------------------------------------  IN: 240 mL / OUT: 0 mL / NET: 240 mL      Daily     Daily     PHYSICAL EXAMINATION:  General: NAD; obese habitus  Eyes: Sclera clear  Neck: No palpable pre-auricular, post-auricular, occipital, mandibular, submental, supra-clavicular, or infra-clavicular lymph nodes   Chest: Clear to auscultation bilaterally; no rales, rhonchi, or wheezing  Heart: borderline tachycardic rate and rhythm; intact S1 and S2; no obvious murmurs, rubs, or gallops  Abd: Soft, nontender, nondistended  Nervous System: Alert and oriented to situation  Psych: Appropriate affect  Ext: no peripheral LE edema bilaterally      LABS:                          8.3    6.86  )-----------( 221      ( 12 Nov 2022 18:47 )             25.2     11-12    140  |  106  |  12  ----------------------------<  107<H>  4.0   |  24  |  0.80    Ca    8.6      12 Nov 2022 06:59  Phos  2.7     11-12  Mg     1.9     11-12    TPro  5.9<L>  /  Alb  3.6  /  TBili  0.4  /  DBili  x   /  AST  23  /  ALT  20  /  AlkPhos  41  11-12    LIVER FUNCTIONS - ( 12 Nov 2022 06:59 )  Alb: 3.6 g/dL / Pro: 5.9 g/dL / ALK PHOS: 41 U/L / ALT: 20 U/L / AST: 23 U/L / GGT: x                       TELEMETRY:     EKG:     IMAGING:

## 2022-11-14 ENCOUNTER — TRANSCRIPTION ENCOUNTER (OUTPATIENT)
Age: 52
End: 2022-11-14

## 2022-11-14 VITALS
SYSTOLIC BLOOD PRESSURE: 129 MMHG | HEART RATE: 75 BPM | TEMPERATURE: 98 F | OXYGEN SATURATION: 99 % | RESPIRATION RATE: 18 BRPM | DIASTOLIC BLOOD PRESSURE: 79 MMHG

## 2022-11-14 PROBLEM — K64.4 RESIDUAL HEMORRHOIDAL SKIN TAGS: Chronic | Status: ACTIVE | Noted: 2022-11-10

## 2022-11-14 PROBLEM — K64.8 OTHER HEMORRHOIDS: Chronic | Status: ACTIVE | Noted: 2022-11-10

## 2022-11-14 LAB
ALBUMIN SERPL ELPH-MCNC: 3.9 G/DL — SIGNIFICANT CHANGE UP (ref 3.3–5)
ALP SERPL-CCNC: 45 U/L — SIGNIFICANT CHANGE UP (ref 40–120)
ALT FLD-CCNC: 22 U/L — SIGNIFICANT CHANGE UP (ref 10–45)
ANION GAP SERPL CALC-SCNC: 9 MMOL/L — SIGNIFICANT CHANGE UP (ref 5–17)
AST SERPL-CCNC: 18 U/L — SIGNIFICANT CHANGE UP (ref 10–40)
BILIRUB SERPL-MCNC: 0.4 MG/DL — SIGNIFICANT CHANGE UP (ref 0.2–1.2)
BUN SERPL-MCNC: 14 MG/DL — SIGNIFICANT CHANGE UP (ref 7–23)
CALCIUM SERPL-MCNC: 8.6 MG/DL — SIGNIFICANT CHANGE UP (ref 8.4–10.5)
CHLORIDE SERPL-SCNC: 106 MMOL/L — SIGNIFICANT CHANGE UP (ref 96–108)
CO2 SERPL-SCNC: 24 MMOL/L — SIGNIFICANT CHANGE UP (ref 22–31)
CREAT SERPL-MCNC: 0.79 MG/DL — SIGNIFICANT CHANGE UP (ref 0.5–1.3)
EGFR: 106 ML/MIN/1.73M2 — SIGNIFICANT CHANGE UP
GLUCOSE SERPL-MCNC: 113 MG/DL — HIGH (ref 70–99)
HCT VFR BLD CALC: 26.2 % — LOW (ref 39–50)
HCT VFR BLD CALC: 27.2 % — LOW (ref 39–50)
HGB BLD-MCNC: 8.6 G/DL — LOW (ref 13–17)
HGB BLD-MCNC: 9.1 G/DL — LOW (ref 13–17)
MAGNESIUM SERPL-MCNC: 2 MG/DL — SIGNIFICANT CHANGE UP (ref 1.6–2.6)
MCHC RBC-ENTMCNC: 29.6 PG — SIGNIFICANT CHANGE UP (ref 27–34)
MCHC RBC-ENTMCNC: 30 PG — SIGNIFICANT CHANGE UP (ref 27–34)
MCHC RBC-ENTMCNC: 32.8 GM/DL — SIGNIFICANT CHANGE UP (ref 32–36)
MCHC RBC-ENTMCNC: 33.5 GM/DL — SIGNIFICANT CHANGE UP (ref 32–36)
MCV RBC AUTO: 89.8 FL — SIGNIFICANT CHANGE UP (ref 80–100)
MCV RBC AUTO: 90 FL — SIGNIFICANT CHANGE UP (ref 80–100)
NRBC # BLD: 0 /100 WBCS — SIGNIFICANT CHANGE UP (ref 0–0)
NRBC # BLD: 0 /100 WBCS — SIGNIFICANT CHANGE UP (ref 0–0)
PHOSPHATE SERPL-MCNC: 2.9 MG/DL — SIGNIFICANT CHANGE UP (ref 2.5–4.5)
PLATELET # BLD AUTO: 265 K/UL — SIGNIFICANT CHANGE UP (ref 150–400)
PLATELET # BLD AUTO: 274 K/UL — SIGNIFICANT CHANGE UP (ref 150–400)
POTASSIUM SERPL-MCNC: 3.8 MMOL/L — SIGNIFICANT CHANGE UP (ref 3.5–5.3)
POTASSIUM SERPL-SCNC: 3.8 MMOL/L — SIGNIFICANT CHANGE UP (ref 3.5–5.3)
PROT SERPL-MCNC: 6.3 G/DL — SIGNIFICANT CHANGE UP (ref 6–8.3)
RBC # BLD: 2.91 M/UL — LOW (ref 4.2–5.8)
RBC # BLD: 3.03 M/UL — LOW (ref 4.2–5.8)
RBC # FLD: 14.1 % — SIGNIFICANT CHANGE UP (ref 10.3–14.5)
RBC # FLD: 14.5 % — SIGNIFICANT CHANGE UP (ref 10.3–14.5)
SODIUM SERPL-SCNC: 139 MMOL/L — SIGNIFICANT CHANGE UP (ref 135–145)
WBC # BLD: 6.41 K/UL — SIGNIFICANT CHANGE UP (ref 3.8–10.5)
WBC # BLD: 6.46 K/UL — SIGNIFICANT CHANGE UP (ref 3.8–10.5)
WBC # FLD AUTO: 6.41 K/UL — SIGNIFICANT CHANGE UP (ref 3.8–10.5)
WBC # FLD AUTO: 6.46 K/UL — SIGNIFICANT CHANGE UP (ref 3.8–10.5)

## 2022-11-14 PROCEDURE — 86850 RBC ANTIBODY SCREEN: CPT

## 2022-11-14 PROCEDURE — 85610 PROTHROMBIN TIME: CPT

## 2022-11-14 PROCEDURE — 86923 COMPATIBILITY TEST ELECTRIC: CPT

## 2022-11-14 PROCEDURE — 84100 ASSAY OF PHOSPHORUS: CPT

## 2022-11-14 PROCEDURE — 83605 ASSAY OF LACTIC ACID: CPT

## 2022-11-14 PROCEDURE — 86900 BLOOD TYPING SEROLOGIC ABO: CPT

## 2022-11-14 PROCEDURE — 83735 ASSAY OF MAGNESIUM: CPT

## 2022-11-14 PROCEDURE — 74177 CT ABD & PELVIS W/CONTRAST: CPT | Mod: MG

## 2022-11-14 PROCEDURE — 85018 HEMOGLOBIN: CPT

## 2022-11-14 PROCEDURE — 82962 GLUCOSE BLOOD TEST: CPT

## 2022-11-14 PROCEDURE — 86901 BLOOD TYPING SEROLOGIC RH(D): CPT

## 2022-11-14 PROCEDURE — 85014 HEMATOCRIT: CPT

## 2022-11-14 PROCEDURE — G1004: CPT

## 2022-11-14 PROCEDURE — 36430 TRANSFUSION BLD/BLD COMPNT: CPT

## 2022-11-14 PROCEDURE — 85027 COMPLETE CBC AUTOMATED: CPT

## 2022-11-14 PROCEDURE — 85520 HEPARIN ASSAY: CPT

## 2022-11-14 PROCEDURE — 36415 COLL VENOUS BLD VENIPUNCTURE: CPT

## 2022-11-14 PROCEDURE — P9016: CPT

## 2022-11-14 PROCEDURE — 93005 ELECTROCARDIOGRAM TRACING: CPT

## 2022-11-14 PROCEDURE — 80053 COMPREHEN METABOLIC PANEL: CPT

## 2022-11-14 PROCEDURE — 82272 OCCULT BLD FECES 1-3 TESTS: CPT

## 2022-11-14 PROCEDURE — 85730 THROMBOPLASTIN TIME PARTIAL: CPT

## 2022-11-14 PROCEDURE — 85025 COMPLETE CBC W/AUTO DIFF WBC: CPT

## 2022-11-14 PROCEDURE — 99285 EMERGENCY DEPT VISIT HI MDM: CPT

## 2022-11-14 PROCEDURE — 99239 HOSP IP/OBS DSCHRG MGMT >30: CPT

## 2022-11-14 PROCEDURE — 87637 SARSCOV2&INF A&B&RSV AMP PRB: CPT

## 2022-11-14 RX ORDER — ATORVASTATIN CALCIUM 80 MG/1
10 TABLET, FILM COATED ORAL AT BEDTIME
Refills: 0 | Status: DISCONTINUED | OUTPATIENT
Start: 2022-11-14 | End: 2022-11-14

## 2022-11-14 RX ORDER — LOSARTAN POTASSIUM 100 MG/1
1 TABLET, FILM COATED ORAL
Qty: 0 | Refills: 0 | DISCHARGE
Start: 2022-11-14

## 2022-11-14 RX ORDER — METOPROLOL TARTRATE 50 MG
1 TABLET ORAL
Qty: 0 | Refills: 0 | DISCHARGE
Start: 2022-11-14

## 2022-11-14 RX ADMIN — PANTOPRAZOLE SODIUM 40 MILLIGRAM(S): 20 TABLET, DELAYED RELEASE ORAL at 06:57

## 2022-11-14 NOTE — PROGRESS NOTE ADULT - PROBLEM SELECTOR PLAN 2
on ASA 81 mg qd, hold in the context of GI bleed on ASA 81 mg qd, hold in the context of GI bleed  - hold on DC, to restart with PCP

## 2022-11-14 NOTE — PROGRESS NOTE ADULT - SUBJECTIVE AND OBJECTIVE BOX
Selina Ramey PGY2  Internal Medicine  Pager [phone] ([facility]), [phone] ([facility]), Available on Teams  After 7pm, please page night float     Progress Note  11-10-22 (4d)  Patient is a 52y old  Male who presents with a chief complaint of GI bleed (14 Nov 2022 07:14)      Subjective / Interval Events :  - Overnight, became tachycardic to 119, was HD stable. STAT CBC showed drop in hgb from 9 > 7.7, received 1u pRBC. Post-transfusion CBC in AM with hgb 8.6   -     Additional ROS (if any): see above, otherwise negative     MEDICATIONS  (STANDING):  influenza   Vaccine 0.5 milliLiter(s) IntraMuscular once  losartan 50 milliGRAM(s) Oral daily  metoprolol succinate ER 25 milliGRAM(s) Oral daily  pantoprazole    Tablet 40 milliGRAM(s) Oral before breakfast    MEDICATIONS  (PRN):  acetaminophen     Tablet .. 650 milliGRAM(s) Oral every 6 hours PRN Mild Pain (1 - 3), Moderate Pain (4 - 6)    CAPILLARY BLOOD GLUCOSE    I&O's Summary    13 Nov 2022 07:01  -  14 Nov 2022 07:00  --------------------------------------------------------  IN: 690 mL / OUT: 500 mL / NET: 190 mL      PHYSICAL EXAM:  Vital Signs Last 24 Hrs  T(C): 36.2 (14 Nov 2022 05:20), Max: 36.9 (13 Nov 2022 20:42)  T(F): 97.1 (14 Nov 2022 05:20), Max: 98.4 (13 Nov 2022 20:42)  HR: 75 (14 Nov 2022 05:20) (69 - 119)  BP: 110/75 (14 Nov 2022 05:20) (100/68 - 164/92)  BP(mean): --  RR: 18 (14 Nov 2022 05:20) (17 - 18)  SpO2: 97% (14 Nov 2022 05:20) (96% - 99%)    Parameters below as of 14 Nov 2022 05:20  Patient On (Oxygen Delivery Method): room air      General:       LABS:                          8.6    6.46  )-----------( 265      ( 14 Nov 2022 05:41 )             26.2       WBC Trend: 6.46<--, 7.27<--, 6.55<--  Hb Trend: 8.6<--, 7.7<--, 9.0<--, 8.3<--, 8.6<--    11-14    139  |  106  |  14  ----------------------------<  113<H>  3.8   |  24  |  0.79    Ca    8.6      14 Nov 2022 05:41  Phos  2.9     11-14  Mg     2.0     11-14    TPro  6.3  /  Alb  3.9  /  TBili  0.4  /  DBili  x   /  AST  18  /  ALT  22  /  AlkPhos  45  11-14        RADIOLOGY & ADDITIONAL TESTS: Reviewed  - No new images  Selina Ramey PGY2  Internal Medicine  Pager [phone] ([facility]), [phone] ([facility]), Available on Teams  After 7pm, please page night float     Progress Note  11-10-22 (4d)  Patient is a 52y old  Male who presents with a chief complaint of GI bleed (14 Nov 2022 07:14)      Subjective / Interval Events :  - Overnight, became tachycardic to 119, was HD stable. STAT CBC showed drop in hgb from 9 > 7.7, received 1u pRBC. Post-transfusion CBC in AM with hgb 8.6   - Pt seen and examined at bedside this AM. Without any acute complaints. Had 1 dark melanic BM yesterday AM, none since. No more bright red blood. Pt feels ready to go home.     Additional ROS (if any): see above, otherwise negative     MEDICATIONS  (STANDING):  influenza   Vaccine 0.5 milliLiter(s) IntraMuscular once  losartan 50 milliGRAM(s) Oral daily  metoprolol succinate ER 25 milliGRAM(s) Oral daily  pantoprazole    Tablet 40 milliGRAM(s) Oral before breakfast    MEDICATIONS  (PRN):  acetaminophen     Tablet .. 650 milliGRAM(s) Oral every 6 hours PRN Mild Pain (1 - 3), Moderate Pain (4 - 6)    CAPILLARY BLOOD GLUCOSE    I&O's Summary    13 Nov 2022 07:01  -  14 Nov 2022 07:00  --------------------------------------------------------  IN: 690 mL / OUT: 500 mL / NET: 190 mL      PHYSICAL EXAM:  Vital Signs Last 24 Hrs  T(C): 36.2 (14 Nov 2022 05:20), Max: 36.9 (13 Nov 2022 20:42)  T(F): 97.1 (14 Nov 2022 05:20), Max: 98.4 (13 Nov 2022 20:42)  HR: 75 (14 Nov 2022 05:20) (69 - 119)  BP: 110/75 (14 Nov 2022 05:20) (100/68 - 164/92)  BP(mean): --  RR: 18 (14 Nov 2022 05:20) (17 - 18)  SpO2: 97% (14 Nov 2022 05:20) (96% - 99%)    Parameters below as of 14 Nov 2022 05:20  Patient On (Oxygen Delivery Method): room air        Physical Exam:  General: NAD, non-toxic appearing   HEENT: EOMi, no scleral icterus  CV: RRR, normal S1 and S2  Lungs: normal respiratory effort on RA, CTAB  Abd: soft, nontender, nondistended  Ext: no edema, warm and well perfused   Pysch: AAOx3, appropriate affect   Neuro: grossly non-focal, moving all extremities spontaneously   Skin: no rashes or lesions     LABS:                          8.6    6.46  )-----------( 265      ( 14 Nov 2022 05:41 )             26.2       WBC Trend: 6.46<--, 7.27<--, 6.55<--  Hb Trend: 8.6<--, 7.7<--, 9.0<--, 8.3<--, 8.6<--    11-14    139  |  106  |  14  ----------------------------<  113<H>  3.8   |  24  |  0.79    Ca    8.6      14 Nov 2022 05:41  Phos  2.9     11-14  Mg     2.0     11-14    TPro  6.3  /  Alb  3.9  /  TBili  0.4  /  DBili  x   /  AST  18  /  ALT  22  /  AlkPhos  45  11-14        RADIOLOGY & ADDITIONAL TESTS: Reviewed  - No new images

## 2022-11-14 NOTE — PROGRESS NOTE ADULT - ASSESSMENT
52M with PMH diverticulosis with GIB x2, s/p bowel resection for diverticulitis, lap band procedure, ext and int hemorrhoids, colitis, small bowel ulcer, CAD on ASA 8, presenting with BRBPR 2/2 diverticular bleed.  52M with PMH diverticulosis with GIB x2, s/p bowel resection for diverticulitis, s/p lap band procedure, ext and int hemorrhoids, colitis, small bowel ulcer, CAD on ASA, presenting with BRBPR, likely diverticular bleed. CTA A/P without active bleeding, now with improved symptoms. s/p 2u pRBC this admission. H/H stable, HD stable, likely discharge to home today.

## 2022-11-14 NOTE — PROGRESS NOTE ADULT - PROBLEM SELECTOR PLAN 1
Patient with hematochezia as well as melena concerning for brisk upper GI bleed vs lower GI bleed and likley diverticular bleed given hx of diverticulosis. Ddx: angioectasia, colonic mass (less likely given colonoscopy results from 2 years ago), ulcer, anal fissure.   Rectal exam in the ED with dark stool, fecal occult positive.   Hgb 9.5 from 12.1// Hgb 13.5 in 6/2022  Rapid called 11/11 due to hypotension, tachycardia, and lightheadness. Pt. was given 1 pRBCs and fluid bolus, is now normotensive. Hgb 10 post transfusion --> today 9.1 --> 8.4 i.s.o bloody BM x2  GI does not recommend scope at this time, given recent colonoscopy about 1 mo ago   CTA A/P 11/11 indicates no active bleeding but shows likely diluted blood products in rectal vault  - diet now advanced to regular   - protonix 40mg PO daily   - Maintain active T&S  - Bariatric surgery team consulted and recs appreciated  - Continue to monitor and trend Hgb; IF downtrending and hemodynamically unstable despite Hgb >7 --> transfuse  - per GI, cleared for discharge from their perspective, no plans for intervention at this time given diverticular bleed, to f/u outpt Patient with hematochezia as well as melena concerning for brisk upper GI bleed vs lower GI bleed and likley diverticular bleed given hx of diverticulosis. Ddx: angioectasia, colonic mass (less likely given colonoscopy results from 2 years ago), ulcer, anal fissure.   Rectal exam in the ED with dark stool, fecal occult positive.   Hgb 9.5 from 12.1// Hgb 13.5 in 6/2022  Rapid called 11/11 due to hypotension, tachycardia, and lightheadness. Pt. was given 1 pRBCs and fluid bolus, is now normotensive. Hgb 10 post transfusion --> today 9.1 --> 8.4 i.s.o bloody BM x2  GI does not recommend scope at this time, given recent colonoscopy about 1 mo ago   CTA A/P 11/11 indicates no active bleeding but shows likely diluted blood products in rectal vault  - diet now advanced to regular   - protonix 40mg PO daily -> given likely LGIB, can d/c   - Maintain active T&S  - Bariatric surgery team consulted and recs appreciated  - Continue to monitor and trend Hgb; IF downtrending and hemodynamically unstable despite Hgb >7 --> transfuse  - per GI, cleared for discharge from their perspective, no plans for intervention at this time given diverticular bleed, to f/u outpt    - given hgb drop this AM, check CBC again in afternoon, if stable, can DC to home today

## 2022-11-14 NOTE — PROGRESS NOTE ADULT - PROBLEM SELECTOR PLAN 3
- home Losartan 50 mg and Metoprolol Succinate 25 mg initially held in context of GIB, now restarted - home Losartan 50 mg and Metoprolol Succinate 25 mg initially held in context of GIB, now restarted  - cont to hold amlodpine on discharge

## 2022-11-14 NOTE — DISCHARGE NOTE NURSING/CASE MANAGEMENT/SOCIAL WORK - PATIENT PORTAL LINK FT
You can access the FollowMyHealth Patient Portal offered by [facility] by registering at the following website: [email or web address]. By joining [email or web address]’s FollowMyHealth portal, you will also be able to view your health information using other applications (apps) compatible with our system.

## 2022-11-14 NOTE — PROGRESS NOTE ADULT - ATTENDING COMMENTS
52M with PMH of diverticulosis c/b diverticulitis s/p bowel resection, lap band surgery, CAD on aspirin, presents to the ED with one day of BRBPR.   Of note, patient admitted in March 2022 for similar presentation. Per chart review, during last admission had multiple episodes of BRBPR. Was evaluated by GI, Hgb and vitals had been stable during hospitalization and bleeding stopped in 24hr period. He had declined inpatient workup at the time and was advised to f/u outpatient with GI for EGD/colonoscopy. Says he had completed the colonoscopy and reportedly unremarkable except for internal hemorrhoids. Has been on aspirin and NSAIDS for joint pain. Denies significant ETOH use. No fevers, chills, URI symptoms, CP, SOB, lightheadedness, dizziness.     #GIB likely lower GIB, diverticular bleed  #Acute blood loss anemia   CTAP: Nondilated rectum containing high attenuation fluid which may represent blood. No active extravasation of contrast into the bowel lumen to suggest source of GI bleed at CT  -GI recs appreciated  -advance diet to regular diet  -s/p 1 unit pRBC 11/13  -repeat CBC this afternoon, if stable, plan for discharge    Discussed with HS7

## 2022-11-15 ENCOUNTER — NON-APPOINTMENT (OUTPATIENT)
Age: 52
End: 2022-11-15

## 2022-11-16 ENCOUNTER — RX RENEWAL (OUTPATIENT)
Age: 52
End: 2022-11-16

## 2022-11-17 ENCOUNTER — EMERGENCY (EMERGENCY)
Facility: HOSPITAL | Age: 52
LOS: 1 days | Discharge: HOME | End: 2022-11-17
Attending: STUDENT IN AN ORGANIZED HEALTH CARE EDUCATION/TRAINING PROGRAM
Payer: COMMERCIAL

## 2022-11-17 ENCOUNTER — APPOINTMENT (OUTPATIENT)
Dept: GASTROENTEROLOGY | Facility: CLINIC | Age: 52
End: 2022-11-17

## 2022-11-17 VITALS
TEMPERATURE: 98.2 F | DIASTOLIC BLOOD PRESSURE: 80 MMHG | HEART RATE: 90 BPM | WEIGHT: 265 LBS | HEIGHT: 68 IN | SYSTOLIC BLOOD PRESSURE: 126 MMHG | BODY MASS INDEX: 40.16 KG/M2 | OXYGEN SATURATION: 99 %

## 2022-11-17 VITALS
TEMPERATURE: 99 F | DIASTOLIC BLOOD PRESSURE: 74 MMHG | HEIGHT: 68 IN | RESPIRATION RATE: 18 BRPM | HEART RATE: 100 BPM | SYSTOLIC BLOOD PRESSURE: 140 MMHG

## 2022-11-17 VITALS
TEMPERATURE: 98 F | DIASTOLIC BLOOD PRESSURE: 80 MMHG | RESPIRATION RATE: 18 BRPM | SYSTOLIC BLOOD PRESSURE: 111 MMHG | OXYGEN SATURATION: 99 % | HEART RATE: 78 BPM

## 2022-11-17 DIAGNOSIS — Z98.89 OTHER SPECIFIED POSTPROCEDURAL STATES: Chronic | ICD-10-CM

## 2022-11-17 LAB
ALBUMIN SERPL ELPH-MCNC: 4 G/DL — SIGNIFICANT CHANGE UP (ref 3.3–5)
ALP SERPL-CCNC: 54 U/L — SIGNIFICANT CHANGE UP (ref 40–120)
ALT FLD-CCNC: 20 U/L — SIGNIFICANT CHANGE UP (ref 10–45)
ANION GAP SERPL CALC-SCNC: 7 MMOL/L — SIGNIFICANT CHANGE UP (ref 5–17)
APTT BLD: 32.4 SEC — SIGNIFICANT CHANGE UP (ref 27.5–35.5)
AST SERPL-CCNC: 13 U/L — SIGNIFICANT CHANGE UP (ref 10–40)
BASOPHILS # BLD AUTO: 0.03 K/UL — SIGNIFICANT CHANGE UP (ref 0–0.2)
BASOPHILS NFR BLD AUTO: 0.4 % — SIGNIFICANT CHANGE UP (ref 0–2)
BILIRUB SERPL-MCNC: 0.4 MG/DL — SIGNIFICANT CHANGE UP (ref 0.2–1.2)
BUN SERPL-MCNC: 13 MG/DL — SIGNIFICANT CHANGE UP (ref 7–23)
CALCIUM SERPL-MCNC: 9.2 MG/DL — SIGNIFICANT CHANGE UP (ref 8.4–10.5)
CHLORIDE SERPL-SCNC: 105 MMOL/L — SIGNIFICANT CHANGE UP (ref 96–108)
CO2 SERPL-SCNC: 27 MMOL/L — SIGNIFICANT CHANGE UP (ref 22–31)
CREAT SERPL-MCNC: 0.68 MG/DL — SIGNIFICANT CHANGE UP (ref 0.5–1.3)
EGFR: 110 ML/MIN/1.73M2 — SIGNIFICANT CHANGE UP
EOSINOPHIL # BLD AUTO: 0.2 K/UL — SIGNIFICANT CHANGE UP (ref 0–0.5)
EOSINOPHIL NFR BLD AUTO: 2.7 % — SIGNIFICANT CHANGE UP (ref 0–6)
GLUCOSE SERPL-MCNC: 93 MG/DL — SIGNIFICANT CHANGE UP (ref 70–99)
HCT VFR BLD CALC: 25.1 % — LOW (ref 39–50)
HGB BLD-MCNC: 8.2 G/DL — LOW (ref 13–17)
IMM GRANULOCYTES NFR BLD AUTO: 0.9 % — SIGNIFICANT CHANGE UP (ref 0–0.9)
INR BLD: 1.16 RATIO — SIGNIFICANT CHANGE UP (ref 0.88–1.16)
LYMPHOCYTES # BLD AUTO: 1.19 K/UL — SIGNIFICANT CHANGE UP (ref 1–3.3)
LYMPHOCYTES # BLD AUTO: 16 % — SIGNIFICANT CHANGE UP (ref 13–44)
MCHC RBC-ENTMCNC: 29.5 PG — SIGNIFICANT CHANGE UP (ref 27–34)
MCHC RBC-ENTMCNC: 32.7 GM/DL — SIGNIFICANT CHANGE UP (ref 32–36)
MCV RBC AUTO: 90.3 FL — SIGNIFICANT CHANGE UP (ref 80–100)
MONOCYTES # BLD AUTO: 1.02 K/UL — HIGH (ref 0–0.9)
MONOCYTES NFR BLD AUTO: 13.7 % — SIGNIFICANT CHANGE UP (ref 2–14)
NEUTROPHILS # BLD AUTO: 4.94 K/UL — SIGNIFICANT CHANGE UP (ref 1.8–7.4)
NEUTROPHILS NFR BLD AUTO: 66.3 % — SIGNIFICANT CHANGE UP (ref 43–77)
NRBC # BLD: 0 /100 WBCS — SIGNIFICANT CHANGE UP (ref 0–0)
PLATELET # BLD AUTO: 283 K/UL — SIGNIFICANT CHANGE UP (ref 150–400)
POTASSIUM SERPL-MCNC: 3.8 MMOL/L — SIGNIFICANT CHANGE UP (ref 3.5–5.3)
POTASSIUM SERPL-SCNC: 3.8 MMOL/L — SIGNIFICANT CHANGE UP (ref 3.5–5.3)
PROT SERPL-MCNC: 6.8 G/DL — SIGNIFICANT CHANGE UP (ref 6–8.3)
PROTHROM AB SERPL-ACNC: 13.5 SEC — HIGH (ref 10.5–13.4)
RBC # BLD: 2.78 M/UL — LOW (ref 4.2–5.8)
RBC # FLD: 13.5 % — SIGNIFICANT CHANGE UP (ref 10.3–14.5)
SODIUM SERPL-SCNC: 139 MMOL/L — SIGNIFICANT CHANGE UP (ref 135–145)
WBC # BLD: 7.45 K/UL — SIGNIFICANT CHANGE UP (ref 3.8–10.5)
WBC # FLD AUTO: 7.45 K/UL — SIGNIFICANT CHANGE UP (ref 3.8–10.5)

## 2022-11-17 PROCEDURE — 80053 COMPREHEN METABOLIC PANEL: CPT

## 2022-11-17 PROCEDURE — 85610 PROTHROMBIN TIME: CPT

## 2022-11-17 PROCEDURE — 85730 THROMBOPLASTIN TIME PARTIAL: CPT

## 2022-11-17 PROCEDURE — 93971 EXTREMITY STUDY: CPT

## 2022-11-17 PROCEDURE — 99215 OFFICE O/P EST HI 40 MIN: CPT

## 2022-11-17 PROCEDURE — 99285 EMERGENCY DEPT VISIT HI MDM: CPT

## 2022-11-17 PROCEDURE — 93971 EXTREMITY STUDY: CPT | Mod: 26,RT

## 2022-11-17 PROCEDURE — 85025 COMPLETE CBC W/AUTO DIFF WBC: CPT

## 2022-11-17 PROCEDURE — 36415 COLL VENOUS BLD VENIPUNCTURE: CPT

## 2022-11-17 PROCEDURE — 99284 EMERGENCY DEPT VISIT MOD MDM: CPT | Mod: 25

## 2022-11-17 RX ORDER — METOPROLOL SUCCINATE 50 MG/1
50 TABLET, EXTENDED RELEASE ORAL DAILY
Qty: 90 | Refills: 2 | Status: DISCONTINUED | COMMUNITY
Start: 2021-09-27 | End: 2022-11-17

## 2022-11-17 RX ORDER — SODIUM SULFATE, POTASSIUM SULFATE, MAGNESIUM SULFATE 17.5; 3.13; 1.6 G/ML; G/ML; G/ML
17.5-3.13-1.6 SOLUTION, CONCENTRATE ORAL
Qty: 1 | Refills: 0 | Status: DISCONTINUED | COMMUNITY
Start: 2022-03-28 | End: 2022-11-17

## 2022-11-17 NOTE — ED PROVIDER NOTE - NSFOLLOWUPINSTRUCTIONS_ED_ALL_ED_FT
Superficial Thrombophlebitis    WHAT YOU NEED TO KNOW:    Superficial thrombophlebitis (STP) is inflammation of a vein just under your skin (superficial vein). The inflammation causes a blood clot to form in your vein. STP most often happens in your leg but may also happen in your arm or neck.       DISCHARGE INSTRUCTIONS:    Call your local emergency number (911 in the [place]) if:   •You feel lightheaded, short of breath, and have chest pain.  •You cough up blood.      Return to the emergency department if:   •Your arm or leg feels warm, tender, and painful. It may look swollen and red.      Call your doctor or hematologist if:   •You have questions or concerns about your condition or care.      Medicines: You may need any of the following:  •Antibiotics may be given to treat a bacterial infection.    •NSAIDs, such as ibuprofen, help decrease swelling, pain, and fever. NSAIDs can cause stomach bleeding or kidney problems in certain people. If you take blood thinner medicine, always ask your healthcare provider if NSAIDs are safe for you. Always read the medicine label and follow directions.    •Blood thinners help prevent blood clots. Clots can cause strokes, heart attacks, and death. The following are general safety guidelines to follow while you are taking a blood thinner:?Watch for bleeding and bruising while you take blood thinners. Watch for bleeding from your gums or nose. Watch for blood in your urine and bowel movements. Use a soft washcloth on your skin, and a soft toothbrush to brush your teeth. This can keep your skin and gums from bleeding. If you shave, use an electric shaver. Do not play contact sports.     ?Tell your dentist and other healthcare providers that you take a blood thinner. Wear a bracelet or necklace that says you take this medicine.       ?Do not start or stop any other medicines unless your healthcare provider tells you to. Many medicines cannot be used with blood thinners.    ?Take your blood thinner exactly as prescribed by your healthcare provider. Do not skip does or take less than prescribed. Tell your provider right away if you forget to take your blood thinner, or if you take too much.    ?Warfarin is a blood thinner that you may need to take. The following are things you should be aware of if you take warfarin:   ?Foods and medicines can affect the amount of warfarin in your blood. Do not make major changes to your diet while you take warfarin. Warfarin works best when you eat about the same amount of vitamin K every day. Vitamin K is found in green leafy vegetables and certain other foods. Ask for more information about what to eat when you are taking warfarin.    ?You will need to see your healthcare provider for follow-up visits when you are on warfarin. You will need regular blood tests. These tests are used to decide how much medicine you need.     •Antiplatelets, such as aspirin, help prevent blood clots. Take your antiplatelet medicine exactly as directed. These medicines make it more likely for you to bleed or bruise. If you are told to take aspirin, do not take acetaminophen or ibuprofen instead.    •Take your medicine as directed. Contact your healthcare provider if you think your medicine is not helping or if you have side effects. Tell your provider if you are allergic to any medicine. Keep a list of the medicines, vitamins, and herbs you take. Include the amounts, and when and why you take them. Bring the list or the pill bottles to follow-up visits. Carry your medicine list with you in case of an emergency.      Manage STP:   •Apply a warm compress to your arm or leg. This will help decrease swelling and pain. Wet a washcloth in warm water. Do not use hot water. Apply the warm compress for 10 minutes. Repeat this 4 times each day.    •Wear pressure stockings as directed. Pressure stockings improve blood flow and help prevent clots in your legs. Wear the stockings during the day. Do not wear them when you sleep.    •Elevate your leg or arm above the level of your heart as often as you can. This will help decrease swelling and pain. Prop your leg or arm on pillows or blankets to keep it elevated comfortably.      Prevent STP:   •Maintain a healthy weight. This will help decrease your risk for another blood clot. Ask your healthcare provider what a healthy weight is for you. Ask him or her to help you create a weight loss plan if needed.    •Do not smoke. Nicotine and other chemicals in cigarettes and cigars can damage blood vessels and increase your risk for blood clots. Ask your healthcare provider for information if you currently smoke and need help to quit. E-cigarettes or smokeless tobacco still contain nicotine. Talk to your healthcare provider before you use these products.    •Exercise regularly to help increase your blood flow. Walking is a good low-impact exercise. Talk to your healthcare provider about the best exercise plan for you.    •Do not inject non-prescription drugs. Talk to your healthcare provider if you need help to quit.      Follow up with your doctor or hematologist as directed: You may need to come in regularly for scans to check for blood clots. Your blood may checked to see how long it takes to clot. Your doctor or specialist will tell you if you need to have this test and how often to have it. Write down your questions so you remember to ask them during your visits. Please follow up in 1 week to have a repeat ultrasound of your arm. You can call your primary doctor to help coordinate this. If you are unable to get this scheduled you can come back to the ER to have the ultrasound performed. The results of today's ultrasound are included in this paperwork.    We recommend warm compresses to the area as well as tylenol to help with any discomfort. You should continue the clindamycin you were prescribed to help prevent any infection from developing.     If you develop fevers/chills, uncontrolled nausea or vomiting, chest pain or trouble breathing, or if you are otherwise concerned, please come back to the ER to be evaluated.    Superficial Thrombophlebitis    WHAT YOU NEED TO KNOW:    Superficial thrombophlebitis (STP) is inflammation of a vein just under your skin (superficial vein). The inflammation causes a blood clot to form in your vein. STP most often happens in your leg but may also happen in your arm or neck.       DISCHARGE INSTRUCTIONS:    Call your local emergency number (911 in the [place]) if:   •You feel lightheaded, short of breath, and have chest pain.  •You cough up blood.      Return to the emergency department if:   •Your arm or leg feels warm, tender, and painful. It may look swollen and red.      Call your doctor or hematologist if:   •You have questions or concerns about your condition or care.      Medicines: You may need any of the following:  •Antibiotics may be given to treat a bacterial infection.    •NSAIDs, such as ibuprofen, help decrease swelling, pain, and fever. NSAIDs can cause stomach bleeding or kidney problems in certain people. If you take blood thinner medicine, always ask your healthcare provider if NSAIDs are safe for you. Always read the medicine label and follow directions.    •Blood thinners help prevent blood clots. Clots can cause strokes, heart attacks, and death. The following are general safety guidelines to follow while you are taking a blood thinner:?Watch for bleeding and bruising while you take blood thinners. Watch for bleeding from your gums or nose. Watch for blood in your urine and bowel movements. Use a soft washcloth on your skin, and a soft toothbrush to brush your teeth. This can keep your skin and gums from bleeding. If you shave, use an electric shaver. Do not play contact sports.     ?Tell your dentist and other healthcare providers that you take a blood thinner. Wear a bracelet or necklace that says you take this medicine.       ?Do not start or stop any other medicines unless your healthcare provider tells you to. Many medicines cannot be used with blood thinners.    ?Take your blood thinner exactly as prescribed by your healthcare provider. Do not skip does or take less than prescribed. Tell your provider right away if you forget to take your blood thinner, or if you take too much.    ?Warfarin is a blood thinner that you may need to take. The following are things you should be aware of if you take warfarin:   ?Foods and medicines can affect the amount of warfarin in your blood. Do not make major changes to your diet while you take warfarin. Warfarin works best when you eat about the same amount of vitamin K every day. Vitamin K is found in green leafy vegetables and certain other foods. Ask for more information about what to eat when you are taking warfarin.    ?You will need to see your healthcare provider for follow-up visits when you are on warfarin. You will need regular blood tests. These tests are used to decide how much medicine you need.     •Antiplatelets, such as aspirin, help prevent blood clots. Take your antiplatelet medicine exactly as directed. These medicines make it more likely for you to bleed or bruise. If you are told to take aspirin, do not take acetaminophen or ibuprofen instead.    •Take your medicine as directed. Contact your healthcare provider if you think your medicine is not helping or if you have side effects. Tell your provider if you are allergic to any medicine. Keep a list of the medicines, vitamins, and herbs you take. Include the amounts, and when and why you take them. Bring the list or the pill bottles to follow-up visits. Carry your medicine list with you in case of an emergency.      Manage STP:   •Apply a warm compress to your arm or leg. This will help decrease swelling and pain. Wet a washcloth in warm water. Do not use hot water. Apply the warm compress for 10 minutes. Repeat this 4 times each day.    •Wear pressure stockings as directed. Pressure stockings improve blood flow and help prevent clots in your legs. Wear the stockings during the day. Do not wear them when you sleep.    •Elevate your leg or arm above the level of your heart as often as you can. This will help decrease swelling and pain. Prop your leg or arm on pillows or blankets to keep it elevated comfortably.      Prevent STP:   •Maintain a healthy weight. This will help decrease your risk for another blood clot. Ask your healthcare provider what a healthy weight is for you. Ask him or her to help you create a weight loss plan if needed.    •Do not smoke. Nicotine and other chemicals in cigarettes and cigars can damage blood vessels and increase your risk for blood clots. Ask your healthcare provider for information if you currently smoke and need help to quit. E-cigarettes or smokeless tobacco still contain nicotine. Talk to your healthcare provider before you use these products.    •Exercise regularly to help increase your blood flow. Walking is a good low-impact exercise. Talk to your healthcare provider about the best exercise plan for you.    •Do not inject non-prescription drugs. Talk to your healthcare provider if you need help to quit.      Follow up with your doctor or hematologist as directed: You may need to come in regularly for scans to check for blood clots. Your blood may checked to see how long it takes to clot. Your doctor or specialist will tell you if you need to have this test and how often to have it. Write down your questions so you remember to ask them during your visits.

## 2022-11-17 NOTE — ED PROVIDER NOTE - PROGRESS NOTE DETAILS
Courtney Ugalde MD PGY1: RUE US with evidence of superficial thrombophlebitis of cephalic vein on RUE. Patient encouraged to use tylenol, warm packs to help with discomfort and aid in breaking up clot. He was encouraged to have a follow up US performed in about 1 week to ensure no progression of symptoms. He will continue the course of clindamycin that he was prescribed. Return precautions were discussed and patient expressed understanding. D/c home in good condition

## 2022-11-17 NOTE — ED PROVIDER NOTE - PATIENT PORTAL LINK FT
You can access the FollowMyHealth Patient Portal offered by [facility] by registering at the following website: [email or web address]. By joining "[email or web address]’s FollowMyHealth portal, you will also be able to view your health information using other applications (apps) compatible with our system.

## 2022-11-17 NOTE — ED PROVIDER NOTE - NSICDXPASTMEDICALHX_GEN_ALL_CORE_FT
PAST MEDICAL HISTORY:  Anxiety     CAD (coronary artery disease)     External hemorrhoids     Hypertension     Internal hemorrhoids     Obesity, morbid, BMI 40.0-49.9     Sleep apnea

## 2022-11-17 NOTE — ED PROVIDER NOTE - OBJECTIVE STATEMENT
52Y M PMH CAD, HTN, recently admitted with UGIB requiring transfusion, presenting with pain and swelling at IV site. States that over the last 2 to 3 days he has had worsening pain, redness, and warmth over his RUE AC where IV was present during his hospitalization.  No issues with IV infiltration during his hospital stay as far as he is aware.  Started on clindamycin yesterday and has taken 1 dose. Feels as though the AC area has been warm and that his right hand has felt cold relative to the left hand. Denies fevers.

## 2022-11-17 NOTE — ED PROVIDER NOTE - ATTENDING CONTRIBUTION TO CARE
I, Abeba Dobbins, performed a history and physical exam of the patient and discussed their management with the resident and /or advanced care provider. I reviewed the resident and /or ACP's note and agree with the documented findings and plan of care. I was present and available for all procedures.     52-year-old male past medical history of CAD, hypertension, recent admission with upper GI bleed requiring transfusion presenting to the ED with complaints of pain and swelling at prior IV site.  Patient reports that over the last 2 to 3 days he has been having increased swelling, pain and redness over his right upper antecubital fossa where IV was placed during his prior hospitalization.  Had no complications with IV while in the hospital as far as he was informed.  Started on clindamycin yesterday and has taken 1 dose thus far.  Notes that he developed a small papule at the site of insertion and was able to express minimal drainage yesterday.  No fevers or chills.      Patient well-appearing in no acute distress resting comfortably in stretcher.  Breathing comfortably on room air, regular rate and rhythm.  Abdomen soft nontender, patient with small papule present to right antecubital fossa with mild surrounding erythema no streaking noted.  Palpable cordlike structure present from site of IV extending proximally.  Mild warmth.  Concern for superficial thrombophlebitis versus early cellulitis.  Will obtain duplex of the upper extremity, labs and reassess.

## 2022-11-17 NOTE — ED ADULT NURSE NOTE - OBJECTIVE STATEMENT
52M, AAO3, c/o RUE pain, redness, swelling beginning yesterday AM. D/c from [facility] on Monday dx GI Bleed. Symptoms began yesterday AM. RUE noted with redness, swelling, tender to touch. Pt denies fever/chills. Seen at [facility] yesterday and started on Clindamycin. Sts "My arm feels tight and stiff"

## 2022-11-17 NOTE — ED PROVIDER NOTE - PHYSICAL EXAMINATION
GENERAL: Awake, alert, NAD  HEAD: NC/AT  EYES: PERRL, EOM grossly intact, sclera anicteric  LUNGS: normal respiratory effort on room air  CARDIAC: regular rate, extremities warm/well perfused, strong distal pulses in bilateral upper extremities   ABDOMEN: Soft, non tender, non distended, no rebound, no guarding  EXT: No edema, no calf tenderness, no deformities.  NEURO: A&Ox3. Moving all extremities.  SKIN: Warm and dry. No rash.  PSYCH: Normal affect. GENERAL: Awake, alert, NAD  HEAD: NC/AT  EYES: PERRL, EOM grossly intact, sclera anicteric  LUNGS: normal respiratory effort on room air  CARDIAC: regular rate, extremities warm/well perfused, strong distal pulses in bilateral upper extremities  ABDOMEN: Soft, non tender, non distended, no rebound, no guarding  EXT: No edema, no calf tenderness, no deformities.  NEURO: A&Ox3. Moving all extremities.  SKIN: Warm and dry. Small scab noted to L AC, mild erythema surrounding the area but no streaking, palpable cord-like structure proximal to IV site  PSYCH: Normal affect.

## 2022-11-17 NOTE — ED PROVIDER NOTE - CLINICAL SUMMARY MEDICAL DECISION MAKING FREE TEXT BOX
52Y M recently admitted for UGIB requiring multiple transfusions presenting 3 days post hospital d/c with pain and redness and RUQ AC IV site.

## 2022-11-20 NOTE — CONSULT LETTER
[FreeTextEntry1] : Dear Dr. Ginger Morrison,\par \par I had the pleasure of seeing your patient GLEN CAMACHO in the office today.  My office note is attached. PLEASE READ THE "ASSESSMENT" SECTION OF THE NOTE TO SEE MY IMPRESSION AND PLAN.\par \par Thank you very much for allowing me to participate in the care of your patient.\par \par Sincerely,\par \par Tyler Scott M.D., FACG, FACP\par Director, Celiac Program at [facility]\par  of Medicine, [employer] [facility] [employer] at [facility] Adjunct  of Medicine, [facility] of Medicine\[facility] Practice Director, [facility] Gastroenterology at [place]\[facility] [address]\[place] [address]\par Tel: [phone]\par Email: [email or web address]\par \par \par The attached note has been created using a voice recognition system (Dragon).  There may be some misspellings and typos.  Please call my office if you have any issues or questions.

## 2022-11-20 NOTE — ASSESSMENT
[FreeTextEntry1] : Patient status post his third lower GI bleed that appears to be diverticular in origin.  He does have a history of terminal ileal ulcers but this is likely unrelated.\par \par The patient was advised to use Slow Fe twice a day.\par \par He will go to the ER today regarding his IV site as this may represent a cellulitis or collection.\par \par The patient will return to see me in 3 weeks.\par \par We had planned to perform a repeat colonoscopy in May 2023 to follow-up the terminal ileal ulcers.  Given the severity of the bleed, we may do this sooner and would likely evaluate the upper tract as well.\par \par \par Plan from 6/6/2022 - Patient status post lower GI bleed that was most likely diverticular in nature.  Colonoscopy did show a terminal ileal ulcer and localized colitis in the mid sigmoid with biopsies both showing inflammation.  It is unclear whether this could represent a more chronic inflammatory disease like Crohn's disease.  The patient is currently asymptomatic.\par \par Blood work was sent for CBC, iron studies, IBD serologies, sed rate, C-reactive protein.\par \par Information was given to the patient regarding diverticulosis and a high-fiber diet.\par \par We will plan on repeating a colonoscopy in 1 year that is May 2023 to assess for chronicity of the terminal ileal ulcer and localized colitis.\par \par Patient will call me if he develops symptoms of diarrhea, abdominal pain, or bleeding.\par \par Patient will return to see me in 1 year or sooner if needed.\par \par \par Plan from 3/28/2022 - Patient status post lower GI bleed which is most likely diverticular in nature.  He has not had a colonoscopy for 3 years.  He has a mother with colon cancer.\par \par Blood work was sent for CBC and iron studies.\par \par A colonoscopy has been scheduled. The risks, benefits, alternatives, and limitations of the procedure, including the possibility of missed lesions, were explained.  The patient will require anesthesia evaluation prior to the procedure given his BMI of 41.05.\par \par Disability forms were completed for the patient.

## 2022-11-20 NOTE — HISTORY OF PRESENT ILLNESS
[FreeTextEntry1] : We reviewed the events and evaluations since the patient's last visit on June 6, 2022.  At that time, blood work showed IBD serologies which could be consistent with Crohn's disease.  This was done because of the history of terminal ileal ulcer.  Additionally, C-reactive protein was mildly elevated at 5 and sed rate was 32.  Iron saturation was 14%.  A CT enterography performed on July 5, 2022 showed fatty liver but no evidence of small bowel disease.\par \par The patient was well until last Wednesday, when he had multiple bloody bowel movements beginning at 2 AM.  He was taken by ambulance to [facility].  He had syncope while in the hospital with no associated trauma.  He was admitted from November 10 in November 14.  During that admission, he received 2 units of packed red blood cells for hemoglobin as low as 8.3.  He had 2 CT scans of the abdomen and pelvis with no significant results.  On discharge, hemoglobin and hematocrit were 9.1 and 27.2 respectively.  This is the third GI bleed that the patient has had, first in 2019, then March 2022, and now.  He currently is having 1 small solid bowel movement a day which is still dark.  He denies abdominal pain, nausea, vomiting, heartburn, dysphagia.  Of note, the patient has erythema and warmth added IV site with a palpable cord.  He was given clindamycin for this yesterday.  Also of note, the patient was taking meloxicam and aspirin.\par \par \par Note from 6/6/2022 - We reviewed the evaluations done since the patient's last visit on March 28, 2022.  He had a borderline anemia at that time with a hemoglobin and hematocrit of 12.3 and 36.0 respectively.  He was placed on Feosol 325 mg a day which he took until 2 weeks ago.  The patient underwent colonoscopy on May 9, 2022.  The exam was significant for the presence of a terminal ileal ulcer with biopsy showing acute and chronic inflammation.  There was also localized inflammation with aphthous ulcer in the mid sigmoid with biopsy showing acute and chronic inflammation, acute cryptitis, and crypt architectural distortion.  The patient has moderate diverticulosis involving the cecum, ascending, and transverse colon.  He has internal and external hemorrhoids which are asymptomatic.  The patient has had no further bleeding.  He reports 1 bowel movement a day which is solid without melena or bright red blood per rectum.  He denies abdominal pain.  He uses Metamucil as needed.  Of note, the patient does have a nephew with Crohn's disease.\par \par \par Note from 3/28/2022 - The patient is a 52-year-old man seen by me for the first time since 2016.  We spent some time reviewing the patient's history.  The patient has a history of a diverticular bleed in 2019.  The patient was well until 8 days ago when he had 4 large bloody bowel movements.  He went to the [facility] where he had 4-5 more bloody bowel movements.  He was in the hospital for 2 nights and observed.  He was sent home 1 week ago.  After discharge, the patient passed 1 black bowel movement with blood and then had some blood streaking on brown stool for a few days.  He is now back to having a brown bowel movement once a day.  He was fatigued last week but now is feeling better.  He denies heartburn, dysphagia, or abdominal pain.  Hemoglobin and hematocrit were 12.0 and 35.1 respectively on March 21.  Of note, the patient's mother had colon cancer.  His last colonoscopy was performed in the hospital in 2019.  I was unable to find the results.  The patient has had no other hospitalizations in the past year.  He has a history of mild coronary artery disease.

## 2022-11-20 NOTE — REASON FOR VISIT
[Spouse] : spouse [Post Hospitalization] : a post hospitalization visit [FreeTextEntry1] : Status post GI bleed, history of terminal ileal ulcer

## 2022-11-23 ENCOUNTER — APPOINTMENT (OUTPATIENT)
Dept: INTERNAL MEDICINE | Facility: CLINIC | Age: 52
End: 2022-11-23

## 2022-11-23 VITALS
WEIGHT: 265 LBS | DIASTOLIC BLOOD PRESSURE: 85 MMHG | TEMPERATURE: 97.5 F | HEART RATE: 82 BPM | BODY MASS INDEX: 40.16 KG/M2 | RESPIRATION RATE: 17 BRPM | HEIGHT: 68 IN | OXYGEN SATURATION: 97 % | SYSTOLIC BLOOD PRESSURE: 123 MMHG

## 2022-11-23 DIAGNOSIS — K52.9 NONINFECTIVE GASTROENTERITIS AND COLITIS, UNSPECIFIED: ICD-10-CM

## 2022-11-23 DIAGNOSIS — F41.9 ANXIETY DISORDER, UNSPECIFIED: ICD-10-CM

## 2022-11-23 DIAGNOSIS — F32.A ANXIETY DISORDER, UNSPECIFIED: ICD-10-CM

## 2022-11-23 DIAGNOSIS — I80.9 PHLEBITIS AND THROMBOPHLEBITIS OF UNSPECIFIED SITE: ICD-10-CM

## 2022-11-23 PROCEDURE — G0008: CPT

## 2022-11-23 PROCEDURE — 99214 OFFICE O/P EST MOD 30 MIN: CPT | Mod: 25

## 2022-11-23 PROCEDURE — 99495 TRANSJ CARE MGMT MOD F2F 14D: CPT | Mod: 25

## 2022-11-23 PROCEDURE — 90686 IIV4 VACC NO PRSV 0.5 ML IM: CPT

## 2022-11-25 LAB
BASOPHILS # BLD AUTO: 0.05 K/UL
BASOPHILS NFR BLD AUTO: 0.6 %
EOSINOPHIL # BLD AUTO: 0.19 K/UL
EOSINOPHIL NFR BLD AUTO: 2.4 %
HCT VFR BLD CALC: 30.8 %
HGB BLD-MCNC: 9.8 G/DL
IMM GRANULOCYTES NFR BLD AUTO: 0.6 %
IRON SATN MFR SERPL: 57 %
IRON SERPL-MCNC: 237 UG/DL
LYMPHOCYTES # BLD AUTO: 1.42 K/UL
LYMPHOCYTES NFR BLD AUTO: 18 %
MAN DIFF?: NORMAL
MCHC RBC-ENTMCNC: 28.5 PG
MCHC RBC-ENTMCNC: 31.8 GM/DL
MCV RBC AUTO: 89.5 FL
MONOCYTES # BLD AUTO: 1.1 K/UL
MONOCYTES NFR BLD AUTO: 13.9 %
NEUTROPHILS # BLD AUTO: 5.08 K/UL
NEUTROPHILS NFR BLD AUTO: 64.5 %
PLATELET # BLD AUTO: 462 K/UL
RBC # BLD: 3.44 M/UL
RBC # FLD: 14.8 %
TIBC SERPL-MCNC: 413 UG/DL
UIBC SERPL-MCNC: 176 UG/DL
WBC # FLD AUTO: 7.89 K/UL

## 2022-11-29 ENCOUNTER — NON-APPOINTMENT (OUTPATIENT)
Age: 52
End: 2022-11-29

## 2022-12-08 ENCOUNTER — APPOINTMENT (OUTPATIENT)
Dept: GASTROENTEROLOGY | Facility: CLINIC | Age: 52
End: 2022-12-08

## 2022-12-08 VITALS
SYSTOLIC BLOOD PRESSURE: 115 MMHG | HEIGHT: 68 IN | HEART RATE: 80 BPM | BODY MASS INDEX: 40.71 KG/M2 | OXYGEN SATURATION: 99 % | WEIGHT: 268.6 LBS | DIASTOLIC BLOOD PRESSURE: 80 MMHG | TEMPERATURE: 97.3 F

## 2022-12-08 DIAGNOSIS — R19.4 CHANGE IN BOWEL HABIT: ICD-10-CM

## 2022-12-08 DIAGNOSIS — R53.1 WEAKNESS: ICD-10-CM

## 2022-12-08 DIAGNOSIS — K63.3 ULCER OF INTESTINE: ICD-10-CM

## 2022-12-08 DIAGNOSIS — R63.0 ANOREXIA: ICD-10-CM

## 2022-12-08 PROCEDURE — 36415 COLL VENOUS BLD VENIPUNCTURE: CPT

## 2022-12-08 PROCEDURE — 99214 OFFICE O/P EST MOD 30 MIN: CPT | Mod: 25

## 2022-12-10 PROBLEM — K63.3 ULCER OF SMALL INTESTINE: Status: ACTIVE | Noted: 2022-06-06

## 2022-12-10 LAB
ALBUMIN SERPL ELPH-MCNC: 4.7 G/DL
ALP BLD-CCNC: 67 U/L
ALT SERPL-CCNC: 30 U/L
ANION GAP SERPL CALC-SCNC: 13 MMOL/L
AST SERPL-CCNC: 24 U/L
BASOPHILS # BLD AUTO: 0.07 K/UL
BASOPHILS NFR BLD AUTO: 0.8 %
BILIRUB SERPL-MCNC: 0.6 MG/DL
BUN SERPL-MCNC: 14 MG/DL
CALCIUM SERPL-MCNC: 9.8 MG/DL
CHLORIDE SERPL-SCNC: 102 MMOL/L
CO2 SERPL-SCNC: 22 MMOL/L
CREAT SERPL-MCNC: 0.79 MG/DL
EGFR: 106 ML/MIN/1.73M2
EOSINOPHIL # BLD AUTO: 0.2 K/UL
EOSINOPHIL NFR BLD AUTO: 2.3 %
FERRITIN SERPL-MCNC: 24 NG/ML
FOLATE SERPL-MCNC: >20 NG/ML
GGT SERPL-CCNC: 36 U/L
GLIADIN IGA SER QL: 9.4 UNITS
GLIADIN IGG SER QL: 10.8 UNITS
GLIADIN PEPTIDE IGA SER-ACNC: NEGATIVE
GLIADIN PEPTIDE IGG SER-ACNC: NEGATIVE
GLUCOSE SERPL-MCNC: 97 MG/DL
HCT VFR BLD CALC: 37.3 %
HGB BLD-MCNC: 11.4 G/DL
IGA SER QL IEP: 213 MG/DL
IMM GRANULOCYTES NFR BLD AUTO: 0.3 %
IRON SATN MFR SERPL: 7 %
IRON SERPL-MCNC: 32 UG/DL
LYMPHOCYTES # BLD AUTO: 1.32 K/UL
LYMPHOCYTES NFR BLD AUTO: 15.4 %
MAN DIFF?: NORMAL
MCHC RBC-ENTMCNC: 27.1 PG
MCHC RBC-ENTMCNC: 30.6 GM/DL
MCV RBC AUTO: 88.8 FL
MONOCYTES # BLD AUTO: 0.81 K/UL
MONOCYTES NFR BLD AUTO: 9.4 %
NEUTROPHILS # BLD AUTO: 6.15 K/UL
NEUTROPHILS NFR BLD AUTO: 71.8 %
PLATELET # BLD AUTO: 364 K/UL
POTASSIUM SERPL-SCNC: 4.9 MMOL/L
PROT SERPL-MCNC: 7.6 G/DL
RBC # BLD: 4.2 M/UL
RBC # FLD: 13.4 %
SARS-COV-2 N GENE NPH QL NAA+PROBE: NOT DETECTED
SODIUM SERPL-SCNC: 137 MMOL/L
TIBC SERPL-MCNC: 456 UG/DL
TSH SERPL-ACNC: 1.34 UIU/ML
TTG IGA SER IA-ACNC: <1.2 U/ML
TTG IGA SER-ACNC: NEGATIVE
TTG IGG SER IA-ACNC: <1.2 U/ML
TTG IGG SER IA-ACNC: NEGATIVE
UIBC SERPL-MCNC: 424 UG/DL
VIT B12 SERPL-MCNC: 1078 PG/ML
WBC # FLD AUTO: 8.58 K/UL

## 2022-12-10 NOTE — CONSULT LETTER
[FreeTextEntry1] : Dear Dr. Ginger Morrison,\par \par I had the pleasure of seeing your patient GLEN CAMACHO in the office today.  My office note is attached. PLEASE READ THE "ASSESSMENT" SECTION OF THE NOTE TO SEE MY IMPRESSION AND PLAN.\par \par Thank you very much for allowing me to participate in the care of your patient.\par \par Sincerely,\par \par Tyler Scott M.D., FACG, FACP\par Director, Celiac Program at [facility]\par  of Medicine, [employer] at [facility] Adjunct  of Medicine, [facility] Practice Director, [facility] Gastroenterology at [place]\[facility] [address]\[place] [address]\par Tel: [phone]\par Email: [email or web address] \par \par The attached note has been created using a voice recognition system (Dragon).  There may be some misspellings and typos.  Please call my office if you have any issues or questions.

## 2022-12-10 NOTE — ASSESSMENT
[FreeTextEntry1] : Patient status post 3 GI bleeds, 2 in the past year.  Although assumed to be diverticular in origin, he has a history of terminal ileal ulcers.  He continues to feel weak with decreased appetite.  He has noted a change in his bowel habits with his stools becoming thin and small.  He did have 1 small episode of rectal bleeding on November 29.  He has never had an upper endoscopy.\par \par I had originally planned to relook in the colon in May of next year regarding the terminal ileal ulcers.  Given the ongoing symptoms and recent events, I have move this up and will perform EGD and colonoscopy simultaneously next week.  An EGD and colonoscopy have been scheduled. The risks, benefits, alternatives, and limitations of the procedures, including the possibility of missed lesions, were explained.  The patient's BMI is 40.84.\par \par Blood work was sent for CBC, comprehensive metabolic panel, TSH, iron studies, B12, folate, celiac markers.\par \par Patient was advised to restart Metamucil powder in 8 ounces of water daily.\par \par Patient was advised to follow-up with his cardiologist as he is weak and lightheaded particularly with exertion.\par \par \par Plan from 11/17/2022 - Patient status post his third lower GI bleed that appears to be diverticular in origin.  He does have a history of terminal ileal ulcers but this is likely unrelated.\par \par The patient was advised to use Slow Fe twice a day.\par \par He will go to the ER today regarding his IV site as this may represent a cellulitis or collection.\par \par The patient will return to see me in 3 weeks.\par \par We had planned to perform a repeat colonoscopy in May 2023 to follow-up the terminal ileal ulcers.  Given the severity of the bleed, we may do this sooner and would likely evaluate the upper tract as well.\par \par \par Plan from 6/6/2022 - Patient status post lower GI bleed that was most likely diverticular in nature.  Colonoscopy did show a terminal ileal ulcer and localized colitis in the mid sigmoid with biopsies both showing inflammation.  It is unclear whether this could represent a more chronic inflammatory disease like Crohn's disease.  The patient is currently asymptomatic.\par \par Blood work was sent for CBC, iron studies, IBD serologies, sed rate, C-reactive protein.\par \par Information was given to the patient regarding diverticulosis and a high-fiber diet.\par \par We will plan on repeating a colonoscopy in 1 year that is May 2023 to assess for chronicity of the terminal ileal ulcer and localized colitis.\par \par Patient will call me if he develops symptoms of diarrhea, abdominal pain, or bleeding.\par \par Patient will return to see me in 1 year or sooner if needed.\par \par \par Plan from 3/28/2022 - Patient status post lower GI bleed which is most likely diverticular in nature.  He has not had a colonoscopy for 3 years.  He has a mother with colon cancer.\par \par Blood work was sent for CBC and iron studies.\par \par A colonoscopy has been scheduled. The risks, benefits, alternatives, and limitations of the procedure, including the possibility of missed lesions, were explained.  The patient will require anesthesia evaluation prior to the procedure given his BMI of 41.05.\par \par Disability forms were completed for the patient.

## 2022-12-10 NOTE — HISTORY OF PRESENT ILLNESS
[FreeTextEntry1] : The patient is status post 2 GI bleeds this year, the last in November.  These were thought to be likely diverticular in origin.  The patient also has a history of terminal ileal ulcers.  At his last visit on November 17, 2022, the patient had an issue at his IV site which was diagnosed as superficial phlebitis.  The patient continues to not feel well.  He notes that his bowel movements have changed and are now thin and small although he is having 1 daily.  He had bright red blood mixed in his stool twice in 1 day on November 29.  He denies melena but his stool is still dark.  He is on Slow Fe twice daily.  He continues to feel weak with no energy.  He gets lightheaded with exertion.  He denies chest pain.  He notes a decreased appetite.  He denies heartburn, dysphagia, abdominal pain.  His weight is stable.  He has had no other hospitalizations in the past year other than for the 2 bleeds.  He denies any cardiac history.\par \par \par Note from 11/17/2022 - We reviewed the events and evaluations since the patient's last visit on June 6, 2022.  At that time, blood work showed IBD serologies which could be consistent with Crohn's disease.  This was done because of the history of terminal ileal ulcer.  Additionally, C-reactive protein was mildly elevated at 5 and sed rate was 32.  Iron saturation was 14%.  A CT enterography performed on July 5, 2022 showed fatty liver but no evidence of small bowel disease.\par \par The patient was well until last Wednesday, when he had multiple bloody bowel movements beginning at 2 AM.  He was taken by ambulance to [facility].  He had syncope while in the hospital with no associated trauma.  He was admitted from November 10 in November 14.  During that admission, he received 2 units of packed red blood cells for hemoglobin as low as 8.3.  He had 2 CT scans of the abdomen and pelvis with no significant results.  On discharge, hemoglobin and hematocrit were 9.1 and 27.2 respectively.  This is the third GI bleed that the patient has had, first in 2019, then March 2022, and now.  He currently is having 1 small solid bowel movement a day which is still dark.  He denies abdominal pain, nausea, vomiting, heartburn, dysphagia.  Of note, the patient has erythema and warmth added IV site with a palpable cord.  He was given clindamycin for this yesterday.  Also of note, the patient was taking meloxicam and aspirin.\par \par \par Note from 6/6/2022 - We reviewed the evaluations done since the patient's last visit on March 28, 2022.  He had a borderline anemia at that time with a hemoglobin and hematocrit of 12.3 and 36.0 respectively.  He was placed on Feosol 325 mg a day which he took until 2 weeks ago.  The patient underwent colonoscopy on May 9, 2022.  The exam was significant for the presence of a terminal ileal ulcer with biopsy showing acute and chronic inflammation.  There was also localized inflammation with aphthous ulcer in the mid sigmoid with biopsy showing acute and chronic inflammation, acute cryptitis, and crypt architectural distortion.  The patient has moderate diverticulosis involving the cecum, ascending, and transverse colon.  He has internal and external hemorrhoids which are asymptomatic.  The patient has had no further bleeding.  He reports 1 bowel movement a day which is solid without melena or bright red blood per rectum.  He denies abdominal pain.  He uses Metamucil as needed.  Of note, the patient does have a nephew with Crohn's disease.\par \par \par Note from 3/28/2022 - The patient is a 52-year-old man seen by me for the first time since 2016.  We spent some time reviewing the patient's history.  The patient has a history of a diverticular bleed in 2019.  The patient was well until 8 days ago when he had 4 large bloody bowel movements.  He went to the [facility] where he had 4-5 more bloody bowel movements.  He was in the hospital for 2 nights and observed.  He was sent home 1 week ago.  After discharge, the patient passed 1 black bowel movement with blood and then had some blood streaking on brown stool for a few days.  He is now back to having a brown bowel movement once a day.  He was fatigued last week but now is feeling better.  He denies heartburn, dysphagia, or abdominal pain.  Hemoglobin and hematocrit were 12.0 and 35.1 respectively on March 21.  Of note, the patient's mother had colon cancer.  His last colonoscopy was performed in the hospital in 2019.  I was unable to find the results.  The patient has had no other hospitalizations in the past year.  He has a history of mild coronary artery disease.

## 2022-12-11 LAB
ENDOMYSIUM IGA SER QL: NEGATIVE
ENDOMYSIUM IGA TITR SER: NORMAL

## 2022-12-12 ENCOUNTER — NON-APPOINTMENT (OUTPATIENT)
Age: 52
End: 2022-12-12

## 2022-12-12 ENCOUNTER — APPOINTMENT (OUTPATIENT)
Dept: GASTROENTEROLOGY | Facility: AMBULATORY MEDICAL SERVICES | Age: 52
End: 2022-12-12

## 2022-12-12 PROCEDURE — 45380 COLONOSCOPY AND BIOPSY: CPT | Mod: 59

## 2022-12-12 PROCEDURE — 43239 EGD BIOPSY SINGLE/MULTIPLE: CPT | Mod: 59

## 2022-12-12 PROCEDURE — 45385 COLONOSCOPY W/LESION REMOVAL: CPT

## 2022-12-19 ENCOUNTER — APPOINTMENT (OUTPATIENT)
Dept: ORTHOPEDIC SURGERY | Facility: CLINIC | Age: 52
End: 2022-12-19

## 2022-12-19 VITALS — DIASTOLIC BLOOD PRESSURE: 84 MMHG | SYSTOLIC BLOOD PRESSURE: 124 MMHG | HEART RATE: 66 BPM

## 2022-12-19 DIAGNOSIS — M25.462 EFFUSION, LEFT KNEE: ICD-10-CM

## 2022-12-19 DIAGNOSIS — M17.11 UNILATERAL PRIMARY OSTEOARTHRITIS, RIGHT KNEE: ICD-10-CM

## 2022-12-19 PROCEDURE — 99213 OFFICE O/P EST LOW 20 MIN: CPT

## 2022-12-19 NOTE — PHYSICAL EXAM
[LE] : Sensory: Intact in bilateral lower extremities [DP] : dorsalis pedis 2+ and symmetric bilaterally [PT] : posterior tibial 2+ and symmetric bilaterally [Normal RLE] : Right Lower Extremity: No scars, rashes, lesions, ulcers, skin intact [Normal Touch] : sensation intact for touch [Normal] : no peripheral adenopathy appreciated [de-identified] : Patient has no obvious scars or vascular changes of the left lower extremity he has full extension he is got flexion greater than 110 degrees no anterior drawer no AP laxity the knee will open to valgus strain calf is nontender sensation and pulses are good at the ankle [de-identified] : Takes an aspirin a day per his cardiologist [de-identified] : Patient recently had a GI bleed requiring 2 transfusions and a hospital stay patient is unable to use any nonsteroidals at this time [de-identified] : Review of the MRI studies done at [facility] indicate extensive erosion of the patella facet cartilage and medial tibial plateau bone-on-bone as well as the medial femoral condyle

## 2022-12-19 NOTE — DISCUSSION/SUMMARY
[de-identified] : Patient was advised on the nature of the x-rays the fact that his knee is not swollen negates the necessity for doing an aspiration of the knee on this visit patient was advised to consider possibilities of a total knee replacement since he is denied 1 more facet and conservative therapy for an osteoarthritic knee.  Patient will make a follow-up appointment to see Dr. Ventura

## 2022-12-19 NOTE — REASON FOR VISIT
[Follow-Up Visit] : a follow-up visit for [Knee Pain] : knee pain [FreeTextEntry2] : Effusion left knee

## 2022-12-19 NOTE — HISTORY OF PRESENT ILLNESS
[de-identified] : Patient was previously seen by Dr. Ventura in the office after a visit to the emergency room for an effusion of his left knee the patient had an aspiration of that knee and is here today because of a reoccurrence of the effusion in the interim the patient has been off his feet recovering from a GI bleed because of the meloxicam that he was using and the swelling has improved tremendously.  Patient had an MRI done in the emergency room at [facility] and this was reviewed today [Improving] : improving [___ days] : [unfilled] day(s) ago [1] : a current pain level of 1/10 [Daily] : ~He/She~ states the symptoms seem to be occuring daily [Walking] : worsened by walking [Knee Flexion] : worsened with knee flexion [Knee Extension] : worsened with knee extension [Recumbency] : relieved by recumbency [Rest] : relieved by rest

## 2023-01-06 ENCOUNTER — APPOINTMENT (OUTPATIENT)
Dept: GASTROENTEROLOGY | Facility: CLINIC | Age: 53
End: 2023-01-06
Payer: COMMERCIAL

## 2023-01-06 VITALS
DIASTOLIC BLOOD PRESSURE: 76 MMHG | WEIGHT: 266 LBS | HEIGHT: 68 IN | HEART RATE: 70 BPM | TEMPERATURE: 97.7 F | OXYGEN SATURATION: 98 % | BODY MASS INDEX: 40.32 KG/M2 | SYSTOLIC BLOOD PRESSURE: 122 MMHG

## 2023-01-06 DIAGNOSIS — K64.4 RESIDUAL HEMORRHOIDAL SKIN TAGS: ICD-10-CM

## 2023-01-06 DIAGNOSIS — K57.30 DIVERTICULOSIS OF LARGE INTESTINE W/OUT PERFORATION OR ABSCESS W/OUT BLEEDING: ICD-10-CM

## 2023-01-06 DIAGNOSIS — K64.8 OTHER HEMORRHOIDS: ICD-10-CM

## 2023-01-06 PROCEDURE — 99214 OFFICE O/P EST MOD 30 MIN: CPT | Mod: 25

## 2023-01-06 PROCEDURE — 36415 COLL VENOUS BLD VENIPUNCTURE: CPT

## 2023-01-07 LAB
BASOPHILS # BLD AUTO: 0.06 K/UL
BASOPHILS NFR BLD AUTO: 0.8 %
EOSINOPHIL # BLD AUTO: 0.19 K/UL
EOSINOPHIL NFR BLD AUTO: 2.5 %
HCT VFR BLD CALC: 41.5 %
HGB BLD-MCNC: 13.2 G/DL
IMM GRANULOCYTES NFR BLD AUTO: 0.1 %
LYMPHOCYTES # BLD AUTO: 1.6 K/UL
LYMPHOCYTES NFR BLD AUTO: 21 %
MAN DIFF?: NORMAL
MCHC RBC-ENTMCNC: 26.3 PG
MCHC RBC-ENTMCNC: 31.8 GM/DL
MCV RBC AUTO: 82.8 FL
MONOCYTES # BLD AUTO: 0.79 K/UL
MONOCYTES NFR BLD AUTO: 10.4 %
NEUTROPHILS # BLD AUTO: 4.96 K/UL
NEUTROPHILS NFR BLD AUTO: 65.2 %
PLATELET # BLD AUTO: 364 K/UL
RBC # BLD: 5.01 M/UL
RBC # FLD: 13.9 %
WBC # FLD AUTO: 7.61 K/UL

## 2023-01-09 ENCOUNTER — NON-APPOINTMENT (OUTPATIENT)
Age: 53
End: 2023-01-09

## 2023-01-09 NOTE — REASON FOR VISIT
[FreeTextEntry1] : Status post GI bleeds, hiatal hernia, intestinal metaplasia of the GE junction, diverticulosis, hemorrhoids, colonic ulcers

## 2023-01-09 NOTE — CONSULT LETTER
[FreeTextEntry1] : Dear Dr. Ginger Morrison,\par \par I had the pleasure of seeing your patient GLEN CAMACHO in the office today.  My office note is attached. PLEASE READ THE "ASSESSMENT" SECTION OF THE NOTE TO SEE MY IMPRESSION AND PLAN.\par \par Thank you very much for allowing me to participate in the care of your patient.\par \par Sincerely,\par \par Tyler Scott M.D., FACG, FACP\par Director, Celiac Program at [facility]\par  of Medicine, [employer] [facility] [employer] at [facility] Adjunct  of Medicine, [facility] Practice Director, [facility] Gastroenterology at [place]\[facility] [address]\[place] [address]\par Tel: [phone]\par Email: [email or web address] \par \par The attached note has been created using a voice recognition system (Dragon).  There may be some misspellings and typos.  Please call my office if you have any issues or questions.

## 2023-01-09 NOTE — ASSESSMENT
[FreeTextEntry1] : Patient is status post 3 GI bleeds, thought to be diverticular in origin.  EGD revealed a 1 cm hiatal hernia with intestinal metaplasia at the GE junction.  Colonoscopy revealed tiny aphthous ulcers in the cecum and moderate right-sided diverticulosis.  Patient is on iron and is feeling better.\par \par Blood work was sent for CBC and iron studies.\par \par In order to complete the GI evaluation, A capsule endoscopy has been scheduled. The risks, benefits, alternatives, and limitations of the procedure were explained.\par \par Information was given to the patient regarding diverticulosis and a high-fiber diet.\par \par Patient was advised to stay off of aspirin.\par \par We will repeat EGD and colonoscopy in 3 years that is December 2025.\par \par \par Plan from 12/8/2022 - Patient status post 3 GI bleeds, 2 in the past year.  Although assumed to be diverticular in origin, he has a history of terminal ileal ulcers.  He continues to feel weak with decreased appetite.  He has noted a change in his bowel habits with his stools becoming thin and small.  He did have 1 small episode of rectal bleeding on November 29.  He has never had an upper endoscopy.\par \par I had originally planned to relook in the colon in May of next year regarding the terminal ileal ulcers.  Given the ongoing symptoms and recent events, I have move this up and will perform EGD and colonoscopy simultaneously next week.  An EGD and colonoscopy have been scheduled. The risks, benefits, alternatives, and limitations of the procedures, including the possibility of missed lesions, were explained.  The patient's BMI is 40.84.\par \par Blood work was sent for CBC, comprehensive metabolic panel, TSH, iron studies, B12, folate, celiac markers.\par \par Patient was advised to restart Metamucil powder in 8 ounces of water daily.\par \par Patient was advised to follow-up with his cardiologist as he is weak and lightheaded particularly with exertion.\par \par \par Plan from 11/17/2022 - Patient status post his third lower GI bleed that appears to be diverticular in origin.  He does have a history of terminal ileal ulcers but this is likely unrelated.\par \par The patient was advised to use Slow Fe twice a day.\par \par He will go to the ER today regarding his IV site as this may represent a cellulitis or collection.\par \par The patient will return to see me in 3 weeks.\par \par We had planned to perform a repeat colonoscopy in May 2023 to follow-up the terminal ileal ulcers.  Given the severity of the bleed, we may do this sooner and would likely evaluate the upper tract as well.\par \par \par Plan from 6/6/2022 - Patient status post lower GI bleed that was most likely diverticular in nature.  Colonoscopy did show a terminal ileal ulcer and localized colitis in the mid sigmoid with biopsies both showing inflammation.  It is unclear whether this could represent a more chronic inflammatory disease like Crohn's disease.  The patient is currently asymptomatic.\par \par Blood work was sent for CBC, iron studies, IBD serologies, sed rate, C-reactive protein.\par \par Information was given to the patient regarding diverticulosis and a high-fiber diet.\par \par We will plan on repeating a colonoscopy in 1 year that is May 2023 to assess for chronicity of the terminal ileal ulcer and localized colitis.\par \par Patient will call me if he develops symptoms of diarrhea, abdominal pain, or bleeding.\par \par Patient will return to see me in 1 year or sooner if needed.\par \par \par Plan from 3/28/2022 - Patient status post lower GI bleed which is most likely diverticular in nature.  He has not had a colonoscopy for 3 years.  He has a mother with colon cancer.\par \par Blood work was sent for CBC and iron studies.\par \par A colonoscopy has been scheduled. The risks, benefits, alternatives, and limitations of the procedure, including the possibility of missed lesions, were explained.  The patient will require anesthesia evaluation prior to the procedure given his BMI of 41.05.\par \par Disability forms were completed for the patient.

## 2023-01-09 NOTE — HISTORY OF PRESENT ILLNESS
[FreeTextEntry1] : We reviewed the evaluations done since the patient's last visit on December 8, 2022.  At that time, blood work revealed hemoglobin and hematocrit of 11.4 and 33.7 which were both improved.  Ferritin was 24 with a 7% saturation.  The patient underwent EGD and colonoscopy on December 12, 2022.  EGD was significant for a 1 cm hiatal hernia with biopsies revealing intestinal metaplasia at the GE junction.  Colonoscopy was significant for tiny aphthous ulcers in the cecum with biopsy showing acute and chronic nonspecific inflammation.  3 hyperplastic polyps were removed from the distal sigmoid.  Patient has moderate diverticulosis involving the cecum, ascending colon, and transverse colon.  He also has internal and external hemorrhoids which are asymptomatic.  He has been taking iron daily and notes that his energy level is better and almost back to normal.  He was on daily aspirin for years but stopped it.  He denies heartburn, dysphagia, abdominal pain.  Bowel movements are normal without melena or bright red blood per rectum.  He is status post his third GI bleed, all thought to be due to diverticulosis.\par \par \par Note from 12/8/2022 - The patient is status post 2 GI bleeds this year, the last in November.  These were thought to be likely diverticular in origin.  The patient also has a history of terminal ileal ulcers.  At his last visit on November 17, 2022, the patient had an issue at his IV site which was diagnosed as superficial phlebitis.  The patient continues to not feel well.  He notes that his bowel movements have changed and are now thin and small although he is having 1 daily.  He had bright red blood mixed in his stool twice in 1 day on November 29.  He denies melena but his stool is still dark.  He is on Slow Fe twice daily.  He continues to feel weak with no energy.  He gets lightheaded with exertion.  He denies chest pain.  He notes a decreased appetite.  He denies heartburn, dysphagia, abdominal pain.  His weight is stable.  He has had no other hospitalizations in the past year other than for the 2 bleeds.  He denies any cardiac history.\par \par \par Note from 11/17/2022 - We reviewed the events and evaluations since the patient's last visit on June 6, 2022.  At that time, blood work showed IBD serologies which could be consistent with Crohn's disease.  This was done because of the history of terminal ileal ulcer.  Additionally, C-reactive protein was mildly elevated at 5 and sed rate was 32.  Iron saturation was 14%.  A CT enterography performed on July 5, 2022 showed fatty liver but no evidence of small bowel disease.\par \par The patient was well until last Wednesday, when he had multiple bloody bowel movements beginning at 2 AM.  He was taken by ambulance to [facility].  He had syncope while in the hospital with no associated trauma.  He was admitted from November 10 in November 14.  During that admission, he received 2 units of packed red blood cells for hemoglobin as low as 8.3.  He had 2 CT scans of the abdomen and pelvis with no significant results.  On discharge, hemoglobin and hematocrit were 9.1 and 27.2 respectively.  This is the third GI bleed that the patient has had, first in 2019, then March 2022, and now.  He currently is having 1 small solid bowel movement a day which is still dark.  He denies abdominal pain, nausea, vomiting, heartburn, dysphagia.  Of note, the patient has erythema and warmth added IV site with a palpable cord.  He was given clindamycin for this yesterday.  Also of note, the patient was taking meloxicam and aspirin.\par \par \par Note from 6/6/2022 - We reviewed the evaluations done since the patient's last visit on March 28, 2022.  He had a borderline anemia at that time with a hemoglobin and hematocrit of 12.3 and 36.0 respectively.  He was placed on Feosol 325 mg a day which he took until 2 weeks ago.  The patient underwent colonoscopy on May 9, 2022.  The exam was significant for the presence of a terminal ileal ulcer with biopsy showing acute and chronic inflammation.  There was also localized inflammation with aphthous ulcer in the mid sigmoid with biopsy showing acute and chronic inflammation, acute cryptitis, and crypt architectural distortion.  The patient has moderate diverticulosis involving the cecum, ascending, and transverse colon.  He has internal and external hemorrhoids which are asymptomatic.  The patient has had no further bleeding.  He reports 1 bowel movement a day which is solid without melena or bright red blood per rectum.  He denies abdominal pain.  He uses Metamucil as needed.  Of note, the patient does have a nephew with Crohn's disease.\par \par \par Note from 3/28/2022 - The patient is a 52-year-old man seen by me for the first time since 2016.  We spent some time reviewing the patient's history.  The patient has a history of a diverticular bleed in 2019.  The patient was well until 8 days ago when he had 4 large bloody bowel movements.  He went to the [facility] where he had 4-5 more bloody bowel movements.  He was in the hospital for 2 nights and observed.  He was sent home 1 week ago.  After discharge, the patient passed 1 black bowel movement with blood and then had some blood streaking on brown stool for a few days.  He is now back to having a brown bowel movement once a day.  He was fatigued last week but now is feeling better.  He denies heartburn, dysphagia, or abdominal pain.  Hemoglobin and hematocrit were 12.0 and 35.1 respectively on March 21.  Of note, the patient's mother had colon cancer.  His last colonoscopy was performed in the hospital in 2019.  I was unable to find the results.  The patient has had no other hospitalizations in the past year.  He has a history of mild coronary artery disease.

## 2023-01-13 ENCOUNTER — NON-APPOINTMENT (OUTPATIENT)
Age: 53
End: 2023-01-13

## 2023-02-05 ENCOUNTER — RX RENEWAL (OUTPATIENT)
Age: 53
End: 2023-02-05

## 2023-02-10 NOTE — ED PROVIDER NOTE - WR ORDER STATUS 1
Ibuprofen and tylenol for the next couple of days. If symptoms don't improve after 2-3 days then start the antibiotics.     To help with nasal congestion/cough: stay hydrated; sip warm tea with a tablespoon of honey or chicken soup; use a topical saline spray; nasal suctioning/blow your nose frequently; use an air humidifier    To help with cough:  honey in those greater than or equal to 1 year old; hard candy or throat lozenges     Push fluids and get plenty of rest.  Follow-up with your primary care doctor for persistent symptoms.      
Canceled

## 2023-02-13 ENCOUNTER — APPOINTMENT (OUTPATIENT)
Dept: CARDIOLOGY | Facility: CLINIC | Age: 53
End: 2023-02-13
Payer: COMMERCIAL

## 2023-02-13 VITALS
DIASTOLIC BLOOD PRESSURE: 72 MMHG | WEIGHT: 266 LBS | HEIGHT: 68 IN | BODY MASS INDEX: 40.32 KG/M2 | SYSTOLIC BLOOD PRESSURE: 110 MMHG | HEART RATE: 71 BPM | OXYGEN SATURATION: 98 %

## 2023-02-13 DIAGNOSIS — I25.10 ATHEROSCLEROTIC HEART DISEASE OF NATIVE CORONARY ARTERY W/OUT ANGINA PECTORIS: ICD-10-CM

## 2023-02-13 DIAGNOSIS — I10 ESSENTIAL (PRIMARY) HYPERTENSION: ICD-10-CM

## 2023-02-13 DIAGNOSIS — Z87.19 PERSONAL HISTORY OF OTHER DISEASES OF THE DIGESTIVE SYSTEM: ICD-10-CM

## 2023-02-13 PROCEDURE — 99214 OFFICE O/P EST MOD 30 MIN: CPT

## 2023-02-13 RX ORDER — SODIUM SULFATE, POTASSIUM SULFATE AND MAGNESIUM SULFATE 1.6; 3.13; 17.5 G/177ML; G/177ML; G/177ML
17.5-3.13-1.6 SOLUTION ORAL
Qty: 1 | Refills: 0 | Status: DISCONTINUED | COMMUNITY
Start: 2022-12-08 | End: 2023-02-13

## 2023-02-13 RX ORDER — MELOXICAM 15 MG/1
15 TABLET ORAL
Qty: 60 | Refills: 2 | Status: DISCONTINUED | COMMUNITY
Start: 2022-09-20 | End: 2023-02-13

## 2023-02-13 RX ORDER — LOSARTAN POTASSIUM 50 MG/1
50 TABLET, FILM COATED ORAL
Refills: 0 | Status: DISCONTINUED | COMMUNITY
End: 2023-02-13

## 2023-02-13 RX ORDER — AMLODIPINE BESYLATE 5 MG/1
5 TABLET ORAL
Qty: 90 | Refills: 2 | Status: DISCONTINUED | COMMUNITY
Start: 2019-09-24 | End: 2023-02-13

## 2023-02-13 RX ORDER — IRON,CARBONYL 45 MG
143 (45 FE) TABLET ORAL TWICE DAILY
Qty: 30 | Refills: 1 | Status: DISCONTINUED | COMMUNITY
Start: 2022-04-11 | End: 2023-02-13

## 2023-02-13 NOTE — DISCUSSION/SUMMARY
[FreeTextEntry1] : OK to stay off aspirin and amlodipine.\par Contin other current cardiac meds incl statin.\par Advised PCP follow up for physical exam with labs.\par Follow up 6 mo\par

## 2023-02-13 NOTE — HISTORY OF PRESENT ILLNESS
[FreeTextEntry1] : Here for follow up.  Hospitalized a few months ago with recurrent diverticular GI bleed.  Required 2 u PRBC transfusion.  He had been taking aspirin and Meloxicam.  Both were stopped, and amlodipine was stopped. BP has remained OK off of it.  No chest pain, SOB, palpitations.  No dizziness.\par

## 2023-02-28 ENCOUNTER — APPOINTMENT (OUTPATIENT)
Dept: GASTROENTEROLOGY | Facility: CLINIC | Age: 53
End: 2023-02-28

## 2023-04-26 NOTE — ED ADULT NURSE NOTE - SUICIDE SCREENING QUESTION 1
drank.            ROS:     Musculoskeletal ROS:   .    Abnormal:  joint pain, joint swelling,neck pain  . AM stiffness (hours): 15min  . Pain scale (0-10): 0  . Fatigue scale (0-10): 0  .    Job status: working / not working  . Activities of daily living: some difficulty,    positive as above with the addition of   Otherwise negative for:  Fevers, chills or sweats. Weight  stable  No headaches or scalp tenderness. No jaw claudication. No acute visual changes. No red or dry eyes. No auditory complaints. No nasal discharge or rhinorrhea or dry mouth. No oral lesions or ulcers. No sore throat. No tongue pain. No cough. No shortness of breath, dyspnea on exertion or wheezing. No hemoptysis. No chest pains or palpitations. No lightheadedness. No pedal edema. No GERD symptoms, abdominal pain,diarrhea or constipation. No increased urinary frequency, nocturia, dysuria or changes in urine color. No alopecia, skin rashes/lesions. No changes in skin tightness. No Raynaud's. Photosensitivity. Current Outpatient Medications:     atorvastatin (LIPITOR) 40 MG tablet, Take 1 tablet by mouth in the morning., Disp: 30 tablet, Rfl: 5    lisinopril-hydroCHLOROthiazide (PRINZIDE;ZESTORETIC) 20-25 MG per tablet, Take 0.5 tablets by mouth every evening, Disp: 30 tablet, Rfl: 2    metFORMIN (GLUCOPHAGE) 1000 MG tablet, Take 1 tablet by mouth in the morning and 1 tablet in the evening. Take with meals.  TAKE 1 TABLET BY MOUTH TWICE A DAY WITH MEALS., Disp: 60 tablet, Rfl: 5    omeprazole (PRILOSEC) 20 MG delayed release capsule, Take 1 capsule by mouth in the morning., Disp: 30 capsule, Rfl: 5    Multiple Vitamins-Minerals (THERAPEUTIC MULTIVITAMIN-MINERALS) tablet, Take 1 tablet by mouth in the morning., Disp: , Rfl:     aspirin 81 MG EC tablet, Take 81 mg by mouth every evening Preventative Only-pt denies heart attack, stroke, blood clots and stents, Disp: , Rfl:     No Known Allergies    Patient Active
No

## 2023-06-12 ENCOUNTER — APPOINTMENT (OUTPATIENT)
Dept: INTERNAL MEDICINE | Facility: CLINIC | Age: 53
End: 2023-06-12

## 2023-07-18 ENCOUNTER — APPOINTMENT (OUTPATIENT)
Dept: ORTHOPEDIC SURGERY | Facility: CLINIC | Age: 53
End: 2023-07-18

## 2023-08-04 ENCOUNTER — RX RENEWAL (OUTPATIENT)
Age: 53
End: 2023-08-04

## 2023-09-24 ENCOUNTER — RX RENEWAL (OUTPATIENT)
Age: 53
End: 2023-09-24

## 2023-11-03 NOTE — ED ADULT TRIAGE NOTE - SOURCE OF INFORMATION
30 y.o. female  at 37w0d  Reports + FM, denies VB, LOF or regular CTX  Patient has had rash for 2-3 weeks that's spreading and getting worse. Itchy, raised, red (see pics in message sent 11/3). Has tried hydrocortisone cream, lotrisone cream, calamine lotion, benadryl cream, PO benadryl, vistaril, ezema lotion, oatmeal baths, lavender and camomile baths, epsom salt baths. Nothing is helping and it's spreading.   Reports no new foods/soaps/detergents.    TW lbs   GBS collected today     Abnormal fetal ultrasound  Has f/u appt with MFM next Friday    Itching  -     Ambulatory referral/consult to Dermatology; Future; Expected date: 11/10/2023    Body rash  -     Ambulatory referral/consult to Dermatology; Future; Expected date: 11/10/2023  Sent message to Dr. Barnett for further recommendations concerning rash  Cholestasis has been ruled out    Other obesity affecting pregnancy in third trimester  -     STREP B SCREEN, VAGINAL / RECTAL         Reviewed warning signs, normal FKCs, labor precautions and how/when to call.  RTC x 1 wk, call or present sooner prn.        EMS

## 2023-11-06 ENCOUNTER — APPOINTMENT (OUTPATIENT)
Dept: INTERNAL MEDICINE | Facility: CLINIC | Age: 53
End: 2023-11-06
Payer: COMMERCIAL

## 2023-11-06 VITALS
BODY MASS INDEX: 41.68 KG/M2 | OXYGEN SATURATION: 97 % | DIASTOLIC BLOOD PRESSURE: 82 MMHG | WEIGHT: 275 LBS | HEART RATE: 70 BPM | SYSTOLIC BLOOD PRESSURE: 126 MMHG | RESPIRATION RATE: 17 BRPM | HEIGHT: 68 IN | TEMPERATURE: 98 F

## 2023-11-06 DIAGNOSIS — Z00.00 ENCOUNTER FOR GENERAL ADULT MEDICAL EXAMINATION W/OUT ABNORMAL FINDINGS: ICD-10-CM

## 2023-11-06 PROCEDURE — 99396 PREV VISIT EST AGE 40-64: CPT

## 2023-11-08 LAB
25(OH)D3 SERPL-MCNC: 20.8 NG/ML
ALBUMIN SERPL ELPH-MCNC: 4.5 G/DL
ALP BLD-CCNC: 64 U/L
ALT SERPL-CCNC: 26 U/L
ANION GAP SERPL CALC-SCNC: 13 MMOL/L
APPEARANCE: CLEAR
AST SERPL-CCNC: 18 U/L
BILIRUB SERPL-MCNC: 0.4 MG/DL
BILIRUBIN URINE: NEGATIVE
BLOOD URINE: NEGATIVE
BUN SERPL-MCNC: 14 MG/DL
CALCIUM SERPL-MCNC: 10 MG/DL
CHLORIDE SERPL-SCNC: 100 MMOL/L
CHOLEST SERPL-MCNC: 147 MG/DL
CO2 SERPL-SCNC: 24 MMOL/L
COLOR: YELLOW
CREAT SERPL-MCNC: 0.76 MG/DL
EGFR: 106 ML/MIN/1.73M2
ESTIMATED AVERAGE GLUCOSE: 120 MG/DL
GLUCOSE QUALITATIVE U: NEGATIVE MG/DL
GLUCOSE SERPL-MCNC: 103 MG/DL
HBA1C MFR BLD HPLC: 5.8 %
HCT VFR BLD CALC: 42.5 %
HDLC SERPL-MCNC: 45 MG/DL
HGB BLD-MCNC: 14 G/DL
IRON SATN MFR SERPL: 18 %
IRON SERPL-MCNC: 73 UG/DL
KETONES URINE: NEGATIVE MG/DL
LDLC SERPL CALC-MCNC: 66 MG/DL
LEUKOCYTE ESTERASE URINE: NEGATIVE
MCHC RBC-ENTMCNC: 30.2 PG
MCHC RBC-ENTMCNC: 32.9 GM/DL
MCV RBC AUTO: 91.8 FL
NITRITE URINE: NEGATIVE
NONHDLC SERPL-MCNC: 102 MG/DL
PH URINE: 6
PLATELET # BLD AUTO: 270 K/UL
POTASSIUM SERPL-SCNC: 4.5 MMOL/L
PROT SERPL-MCNC: 7.3 G/DL
PROTEIN URINE: NEGATIVE MG/DL
PSA FREE FLD-MCNC: 26 %
PSA FREE SERPL-MCNC: 0.17 NG/ML
PSA SERPL-MCNC: 0.64 NG/ML
RBC # BLD: 4.63 M/UL
RBC # FLD: 13.2 %
SODIUM SERPL-SCNC: 137 MMOL/L
SPECIFIC GRAVITY URINE: 1.02
TIBC SERPL-MCNC: 410 UG/DL
TRIGL SERPL-MCNC: 225 MG/DL
TSH SERPL-ACNC: 0.94 UIU/ML
UIBC SERPL-MCNC: 337 UG/DL
UROBILINOGEN URINE: 0.2 MG/DL
WBC # FLD AUTO: 8.49 K/UL

## 2024-01-11 NOTE — H&P ADULT - NSHPROSALLOTHERNEGRD_GEN_ALL_CORE
WELL WOMAN EXAM    CHIEF COMPLAINT:  Complete well woman exam with Pap.    Patient has given consent to record this visit for documentation in their clinical record.    HISTORY OF PRESENT ILLNESS:  Patient is here today for complete physical.    Last visit: 10/17/2023.  Ophthalmology visit: Due.   Immunization: Up-to-date.   Diet: She watches her diet.  Last screening labs: 2023.  Depression screening: Negative.   Recent infections or falls or hospitalizations: None.  Smoking history: She is not a smoker.    Obstetric history: .   Last Pap smear: Years ago.   History of abnormal Pap smear: None.  Menopause date: Years ago.   Menopause symptoms: None.  HRT (Hormone Replacement Therapy) or hormonal contraceptive use: None.  Last mammogram: 2023. No family history.  Last bone density: 2022. Has mild osteopenia.  Last colonoscopy: 2019, good till  due to polyps.  Menstrual problems: None.  Genitourinary issues: None.  Additional issues to discuss: None.    Medication:   She takes Crestor for high cholesterol.  She takes Lisinopril, Amlodipine, and Atenolol for high blood pressure.  She takes Myrbetriq for overactive bladder.   She takes Evista for osteoporosis as she had GI issues with Fosamax.  She is on Aspirin 81 mg.  She stopped taking iron supplements due to constipation.    Labs:  The last HbA1c done on 2023 was borderline high at 5.9%.    Diabetic Foot Exam Documentation     [facility] Diabetic Foot Exam: Normal Bilateral Foot Exam:  Skin integrity is normal. Dorsalis pedis and posterior tibial pulses are present.  Pressure sensation using the Strunk-Shana monofilament is present.      Past Medical History:   Diagnosis Date    Bilateral bunions     Colon polyps     DM (diabetes mellitus) (CMD)     diet controlled    HTN (hypertension)     Hypercholesterolemia     Keloid 2013    mid-abdomen - first treated  w/unknown type laser; lesion recurred 7 years later. Lesion  \"removed approx 2003\". Lesion recurred approx 2010     Venous insufficiency        Past Surgical History:   Procedure Laterality Date    Colonoscopy  10/01/2008    Colonoscopy w/ polypectomy  09/17/2014    Dr. Ratl[facility]( [facility])- Colon polyp noted- repeat in 5 years    Ct guided needle placement  04/09/2015    SAY Injection, L5-S1    Keloid excision  04/03/2013    CO2 laser keloid excision, mid-abdomen    Mammo screening bilateral  07/05/2012    Mammo stereotactic biopsy right Right 2002    stereo - neg    Oophorectomy Bilateral 1990    Pap smear,routine  01/01/2007    Fernandez earlobes  04/04/2006    Pre-malignant / benign skin lesion excision  04/04/2006    Total abdominal hysterectomy w/ bilateral salpingoophorectomy  01/01/1990       Current Outpatient Medications   Medication Sig Dispense Refill    rosuvastatin (CRESTOR) 20 MG tablet TAKE 1 TABLET DAILY 90 tablet 3    mirabegron ER (MYRBETRIQ) 25 MG 24 hour tablet Take 1 tablet by mouth daily. 90 tablet 0    meloxicam (MOBIC) 15 MG tablet Take 1 tablet by mouth daily. Take with food, stop if causes stomach upset 90 tablet 1    raloxifene (EVISTA) 60 MG tablet Take 1 tablet by mouth daily. 90 tablet 3    lisinopril (ZESTRIL) 40 MG tablet Take 1 tablet by mouth daily. 90 tablet 3    amLODIPine (NORVASC) 10 MG tablet TAKE 1 TABLET DAILY 90 tablet 3    atenolol (TENORMIN) 100 MG tablet Take 1 tablet by mouth daily. 90 tablet 3    aspirin 81 MG tablet Take 1 tablet by mouth daily.       No current facility-administered medications for this visit.       ALLERGIES:   Allergen Reactions    Codeine HIVES     fever  Itching,rash,fever,nausea    Keflex NAUSEA       Family History   Problem Relation Age of Onset    Diabetes Mother     Diabetes Sister     Heart disease Sister     Diabetes Brother        Social History     Socioeconomic History    Marital status: /Civil Union     Spouse name: Not on file    Number of children: Not on file    Years of education: Not  on file    Highest education level: Not on file   Occupational History    Not on file   Tobacco Use    Smoking status: Former     Current packs/day: 0.00     Average packs/day: 0.5 packs/day for 5.0 years (2.5 ttl pk-yrs)     Types: Cigarettes     Start date: 1988     Quit date: 1993     Years since quittin.8    Smokeless tobacco: Never   Vaping Use    Vaping Use: never used   Substance and Sexual Activity    Alcohol use: No    Drug use: No    Sexual activity: Not on file   Other Topics Concern    Not on file   Social History Narrative        Lives with  and 3 children    No pets    Exercise - 1-2/day- at home    Works at [employer]     Social Determinants of Health     Financial Resource Strain: Not on file   Food Insecurity: Not on file   Transportation Needs: Not on file   Physical Activity: Not on file   Stress: Not on file   Social Connections: Not on file   Interpersonal Safety: Not on file       REVIEW OF SYSTEMS:    CONSTITUTIONAL:  No significant weight gain or loss.  No fever or chills.  Normal nutritional habits.  ENT:  Ears:  No hearing loss or recurrent infections.  Nose:  No epistaxis, rhinorrhea, nasal congestion.  Mouth/throat:  No sores in mouth, no recurrent sore throat, hoarseness, dental problems.    RESPIRATORY:  No history of asthma, copd (chronic obstructive pulmonary disease).  Nonsmoker.  No dyspnea.  No chronic cough.  CARDIOVASCULAR:  No history of cardiac disease.  No chest pain.  No shortness of breath at rest or with exertion.  No pain in calves with ambulation.  No palpitations.  No dizziness upon standing.  GASTROINTESTINAL:  No nausea or vomiting.  No diarrhea or constipation.  No bleeding per rectum.  No history of liver disease or pancreatic disease.  No GERD (gastroesophageal reflux disease), dysphagia or odynophagia.  GENITOURINARY:  No difficulty voiding or recurrent urinary tract infections.  No history of renal disease or stones.         Pap  smears normal and up-to-date.  SKIN:  No rashes or history of significant skin disease.  No history of skin cancer or pre-cancerous lesions.  HEMATOLOGIC / LYMPHATIC:  No history of anemia or significant blood dyscrasias.  No recurrent enlarged lymph nodes.  No history of hematologic or lymphatic malignancies.    ENDOCRINE:  No history of thyroid disease.  No reported hair loss, cold intolerance, significant weight changes, fatigue.  No history of diabetes.  No polydypsia or polyuria.  No history of cholesterol or triglyceride problems.    MUSCULOSKELETAL:  No joint pain or muscle weakness.  No history of significant arthritis.  No complaints of pain involving neck or back.  NEUROLOGIC:   No history of seizures or other neurological conditions.  No weakness, numbness or tingling in extremities.  No recurrent headaches.  No dizziness.  No difficulty with gait.  No history of CVA (cerebrovascular accident).  PSYCHIATRIC:  No hypersomnolence, agitation, depressed mood, anxiety symptoms, or panic symptoms.  No history of treatment for psychiatric illness.  Denies auditory or visual hallucinations.        PHYSICAL EXAM:  GENERAL:  Well-developed, well-hydrated, pleasant female in no acute distress.    Blood pressure 137/69, pulse 62, height 5' 6\" (1.676 m), weight 77.3 kg (170 lb 8.4 oz).  HEENT/NECK:  HEAD:  Normocephalic.  EYES: PERRLA, EOMI (Pupils equal, round, reactive to light and accommodation), sclerae and conjuctivae clear, lids normal bilaterally.  EARS:  Normal pinnae, external auditory canals and tympanic membranes.  Hearing is grossly normal.  NOSE:  Normal turbinates and no significant deviation of septum.  OROPHARYNX/THROAT:  No erythema, exudates or postnasal drip.  No suspicious intraoral lesions.  Normal tonsils, palate elevates normally, uvula in midline.  NECK:  No significant anterior cervical or supraclavicular lymphadenopathy.  Normal sized thyroid to palpation with no identified masses.  No  carotid bruits or JVD (jugular venous distention).     HEART:  Regular rate and rhythm.  Normal S1 and S2.  No murmur or extra heart sounds.  PMI (Point of maximal impulse) is not displaced.  LUNGS:  Clear to auscultation bilaterally with good respiratory effort.  ABDOMEN:  Soft, nontender, positive bowel sounds, no masses or organomegaly.  No hernias appreciated.  EXTREMITIES:  No clubbing, cyanosis, edema.  No nail abnormalities.    MUSCULOSKELETAL:  Examination of the neck, shoulders, elbows, wrists, hands, thoracolumbar spine, hips, knees, ankles and feet reveals no crepitance, deformity or swelling.  Range of motion is normal.  All joints are stable and with normal muscle tone and strength.  Gait is normal.  NEUROLOGICAL:  Cranial nerves II-XII are intact by testing.  Muscle mass is symmetric and normal, and strength is 5/5 and equal bilaterally.  DTRs (Deep tendon reflexes) and gross sensory testing to light touch are normal and symmetric in the upper and lower extremities.    SKIN:  Warm and dry without suspicious lesions or rashes.  PSYCHIATRIC:  Alert and oriented.  Judgment is intact.  Mood and affect are appropriate for situation.    ASSESSMENT:  Well woman s    Body mass index is 27.52 kg/m².    BMI (BODY MASS INDEX) ASSESSMENT/PLAN:  Patient is overweight.    Journal food intake daily       Review Flowsheet  More data exists         7/11/2023   PHQ 2/9 Score   Adult PHQ 2 Score 0 0   Adult PHQ 2 Interpretation No further screening needed No further screening needed   Little interest or pleasure in activity? Not at all Not at all   Feeling down, depressed or hopeless? Not at all Not at all       DEPRESSION ASSESSMENT/PLAN:        PLAN:  A 64-year-old female presents with:    1. Borderline diabetes mellitus  Check labs.  Recommend watching diet for sugars.  Advised following up with an ophthalmologist for screening.   Ordered:  Glycohemoglobin; Future  Microalbumin Urine Random.  Creatine Kinase;  Future.     2. Physical exam:  Check labs.  Colonoscopy is good till 2024, this year.   Refills: None.  Discussed vaccines including hepatitis B, tetanus and influenza.  Discussed appropriate self-care including diet, exercise, daily calcium intake.  Return for next physical in 1 year      3. Essential hypertension:  Stable.  Continue current management.     4. Pure hypercholesterolemia:  Stable.  Continue current management.     5. Age-related osteoporosis without current pathological fracture/ Estrogen deficiency:  Check labs end of this year.  Recommended weight-bearing exercises.   Ordered:  BD DEXA SCAN AXIAL SKELETON; Future.     6. OAB (overactive bladder):  Stable.  Continue current management.     7. Multinodular goiter:  Stable.  Reviewed recent reports.  Patient reassured.  No need for a biopsy or follow-up this time.     Recent external records-notes and labs are reviewed, interpreted and explained to the patient. Current and recent labs, and timing of future labs are discussed with the patient. The patient will be informed about the lab results and further management.    Explained the pathophysiology of the disease process and treatment plan. Treatment options are discussed with the patient and explained in detail. The risks, benefits and potential side effects of possible medications were reviewed. Alternatives were discussed. Medication instructions and consequences of not taking the medications were discussed. The patient's understanding was assessed, and the patient agreed with the plan. Monitoring parameters and expected course outline. Patient to call or come in if symptoms fail to respond as outlined, or worsen in any way. If symptoms are not better, need to follow up here or if worse, need to go to ED (Emergency Department).     Follow up as needed or Return in about 6 months (around 7/11/2024).    Refer to orders.  Medical compliance with plan discussed and risks of non-compliance  reviewed.  Patient education completed on disease process, etiology & prognosis.  Proper usage and side effects of medications reviewed & discussed.  Return to clinic as clinically indicated as discussed with patient who verbalized understanding of the plan and is in agreement with the plan.    ITeri, have created a visit summary document based on the audio recording between Dr. Aileen Thomas MD and this patient for the physician to review, edit as needed, and authenticate.  Creation Date: 1/11/2024     All other review of systems negative, except as noted in HPI

## 2024-01-20 ENCOUNTER — RX RENEWAL (OUTPATIENT)
Age: 54
End: 2024-01-20

## 2024-01-20 RX ORDER — METOPROLOL SUCCINATE 50 MG/1
50 TABLET, EXTENDED RELEASE ORAL
Qty: 90 | Refills: 0 | Status: ACTIVE | COMMUNITY
Start: 1900-01-01 | End: 1900-01-01

## 2024-01-25 ENCOUNTER — RX RENEWAL (OUTPATIENT)
Age: 54
End: 2024-01-25

## 2024-01-25 RX ORDER — ATORVASTATIN CALCIUM 10 MG/1
10 TABLET, FILM COATED ORAL DAILY
Qty: 90 | Refills: 3 | Status: ACTIVE | COMMUNITY
Start: 2021-09-20 | End: 1900-01-01

## 2024-02-08 NOTE — PROGRESS NOTE ADULT - PROBLEM/PLAN-1
Being comfortable and being near her family.
DISPLAY PLAN FREE TEXT
MSK XR negative - No fracture, No dislocation, No foreign body

## 2024-02-23 ENCOUNTER — NON-APPOINTMENT (OUTPATIENT)
Age: 54
End: 2024-02-23

## 2024-02-24 ENCOUNTER — EMERGENCY (EMERGENCY)
Facility: HOSPITAL | Age: 54
LOS: 1 days | Discharge: HOME | End: 2024-02-24
Attending: EMERGENCY MEDICINE
Payer: COMMERCIAL

## 2024-02-24 VITALS
SYSTOLIC BLOOD PRESSURE: 162 MMHG | DIASTOLIC BLOOD PRESSURE: 93 MMHG | HEART RATE: 66 BPM | RESPIRATION RATE: 16 BRPM | TEMPERATURE: 98 F | HEIGHT: 68 IN | WEIGHT: 270.07 LBS

## 2024-02-24 DIAGNOSIS — Z98.84 BARIATRIC SURGERY STATUS: ICD-10-CM

## 2024-02-24 DIAGNOSIS — I10 ESSENTIAL (PRIMARY) HYPERTENSION: ICD-10-CM

## 2024-02-24 DIAGNOSIS — E78.00 PURE HYPERCHOLESTEROLEMIA, UNSPECIFIED: ICD-10-CM

## 2024-02-24 DIAGNOSIS — Z98.89 OTHER SPECIFIED POSTPROCEDURAL STATES: Chronic | ICD-10-CM

## 2024-02-24 DIAGNOSIS — R10.10 UPPER ABDOMINAL PAIN, UNSPECIFIED: ICD-10-CM

## 2024-02-24 DIAGNOSIS — Z90.49 ACQUIRED ABSENCE OF OTHER SPECIFIED PARTS OF DIGESTIVE TRACT: ICD-10-CM

## 2024-02-24 DIAGNOSIS — Z87.19 PERSONAL HISTORY OF OTHER DISEASES OF THE DIGESTIVE SYSTEM: ICD-10-CM

## 2024-02-24 DIAGNOSIS — R53.1 WEAKNESS: ICD-10-CM

## 2024-02-24 LAB
ALBUMIN SERPL ELPH-MCNC: 4.4 G/DL — SIGNIFICANT CHANGE UP (ref 3.3–5)
ALP SERPL-CCNC: 73 U/L — SIGNIFICANT CHANGE UP (ref 40–120)
ALT FLD-CCNC: 40 U/L — SIGNIFICANT CHANGE UP (ref 10–45)
ANION GAP SERPL CALC-SCNC: 12 MMOL/L — SIGNIFICANT CHANGE UP (ref 5–17)
APTT BLD: 27.1 SEC — SIGNIFICANT CHANGE UP (ref 24.5–35.6)
AST SERPL-CCNC: 32 U/L — SIGNIFICANT CHANGE UP (ref 10–40)
BASE EXCESS BLDV CALC-SCNC: 1 MMOL/L — SIGNIFICANT CHANGE UP (ref -2–3)
BASOPHILS # BLD AUTO: 0.04 K/UL — SIGNIFICANT CHANGE UP (ref 0–0.2)
BASOPHILS NFR BLD AUTO: 0.6 % — SIGNIFICANT CHANGE UP (ref 0–2)
BILIRUB SERPL-MCNC: 0.8 MG/DL — SIGNIFICANT CHANGE UP (ref 0.2–1.2)
BUN SERPL-MCNC: 12 MG/DL — SIGNIFICANT CHANGE UP (ref 7–23)
CA-I SERPL-SCNC: 1.24 MMOL/L — SIGNIFICANT CHANGE UP (ref 1.15–1.33)
CALCIUM SERPL-MCNC: 9.5 MG/DL — SIGNIFICANT CHANGE UP (ref 8.4–10.5)
CHLORIDE BLDV-SCNC: 102 MMOL/L — SIGNIFICANT CHANGE UP (ref 96–108)
CHLORIDE SERPL-SCNC: 102 MMOL/L — SIGNIFICANT CHANGE UP (ref 96–108)
CO2 BLDV-SCNC: 27 MMOL/L — HIGH (ref 22–26)
CO2 SERPL-SCNC: 21 MMOL/L — LOW (ref 22–31)
CREAT SERPL-MCNC: 0.7 MG/DL — SIGNIFICANT CHANGE UP (ref 0.5–1.3)
EGFR: 109 ML/MIN/1.73M2 — SIGNIFICANT CHANGE UP
EOSINOPHIL # BLD AUTO: 0.17 K/UL — SIGNIFICANT CHANGE UP (ref 0–0.5)
EOSINOPHIL NFR BLD AUTO: 2.5 % — SIGNIFICANT CHANGE UP (ref 0–6)
GAS PNL BLDV: 135 MMOL/L — LOW (ref 136–145)
GAS PNL BLDV: SIGNIFICANT CHANGE UP
GLUCOSE BLDV-MCNC: 91 MG/DL — SIGNIFICANT CHANGE UP (ref 70–99)
GLUCOSE SERPL-MCNC: 107 MG/DL — HIGH (ref 70–99)
HCO3 BLDV-SCNC: 26 MMOL/L — SIGNIFICANT CHANGE UP (ref 22–29)
HCT VFR BLD CALC: 44 % — SIGNIFICANT CHANGE UP (ref 39–50)
HCT VFR BLDA CALC: 43 % — SIGNIFICANT CHANGE UP (ref 39–51)
HGB BLD CALC-MCNC: 14.4 G/DL — SIGNIFICANT CHANGE UP (ref 12.6–17.4)
HGB BLD-MCNC: 15.3 G/DL — SIGNIFICANT CHANGE UP (ref 13–17)
IMM GRANULOCYTES NFR BLD AUTO: 0.3 % — SIGNIFICANT CHANGE UP (ref 0–0.9)
INR BLD: 1.15 RATIO — SIGNIFICANT CHANGE UP (ref 0.85–1.18)
LACTATE BLDV-MCNC: 0.9 MMOL/L — SIGNIFICANT CHANGE UP (ref 0.5–2)
LIDOCAIN IGE QN: 25 U/L — SIGNIFICANT CHANGE UP (ref 7–60)
LYMPHOCYTES # BLD AUTO: 1.35 K/UL — SIGNIFICANT CHANGE UP (ref 1–3.3)
LYMPHOCYTES # BLD AUTO: 20 % — SIGNIFICANT CHANGE UP (ref 13–44)
MCHC RBC-ENTMCNC: 30.2 PG — SIGNIFICANT CHANGE UP (ref 27–34)
MCHC RBC-ENTMCNC: 34.8 GM/DL — SIGNIFICANT CHANGE UP (ref 32–36)
MCV RBC AUTO: 86.8 FL — SIGNIFICANT CHANGE UP (ref 80–100)
MONOCYTES # BLD AUTO: 0.65 K/UL — SIGNIFICANT CHANGE UP (ref 0–0.9)
MONOCYTES NFR BLD AUTO: 9.6 % — SIGNIFICANT CHANGE UP (ref 2–14)
NEUTROPHILS # BLD AUTO: 4.53 K/UL — SIGNIFICANT CHANGE UP (ref 1.8–7.4)
NEUTROPHILS NFR BLD AUTO: 67 % — SIGNIFICANT CHANGE UP (ref 43–77)
NRBC # BLD: 0 /100 WBCS — SIGNIFICANT CHANGE UP (ref 0–0)
PCO2 BLDV: 42 MMHG — SIGNIFICANT CHANGE UP (ref 42–55)
PH BLDV: 7.4 — SIGNIFICANT CHANGE UP (ref 7.32–7.43)
PLATELET # BLD AUTO: 261 K/UL — SIGNIFICANT CHANGE UP (ref 150–400)
PO2 BLDV: 49 MMHG — HIGH (ref 25–45)
POTASSIUM BLDV-SCNC: 4.3 MMOL/L — SIGNIFICANT CHANGE UP (ref 3.5–5.1)
POTASSIUM SERPL-MCNC: 4.3 MMOL/L — SIGNIFICANT CHANGE UP (ref 3.5–5.3)
POTASSIUM SERPL-SCNC: 4.3 MMOL/L — SIGNIFICANT CHANGE UP (ref 3.5–5.3)
PROT SERPL-MCNC: 7.7 G/DL — SIGNIFICANT CHANGE UP (ref 6–8.3)
PROTHROM AB SERPL-ACNC: 12 SEC — SIGNIFICANT CHANGE UP (ref 9.5–13)
RBC # BLD: 5.07 M/UL — SIGNIFICANT CHANGE UP (ref 4.2–5.8)
RBC # FLD: 12.2 % — SIGNIFICANT CHANGE UP (ref 10.3–14.5)
SAO2 % BLDV: 80.3 % — SIGNIFICANT CHANGE UP (ref 67–88)
SODIUM SERPL-SCNC: 135 MMOL/L — SIGNIFICANT CHANGE UP (ref 135–145)
WBC # BLD: 6.76 K/UL — SIGNIFICANT CHANGE UP (ref 3.8–10.5)
WBC # FLD AUTO: 6.76 K/UL — SIGNIFICANT CHANGE UP (ref 3.8–10.5)

## 2024-02-24 PROCEDURE — 85610 PROTHROMBIN TIME: CPT

## 2024-02-24 PROCEDURE — 85025 COMPLETE CBC W/AUTO DIFF WBC: CPT

## 2024-02-24 PROCEDURE — 82435 ASSAY OF BLOOD CHLORIDE: CPT

## 2024-02-24 PROCEDURE — 82330 ASSAY OF CALCIUM: CPT

## 2024-02-24 PROCEDURE — 84132 ASSAY OF SERUM POTASSIUM: CPT

## 2024-02-24 PROCEDURE — 84295 ASSAY OF SERUM SODIUM: CPT

## 2024-02-24 PROCEDURE — 83605 ASSAY OF LACTIC ACID: CPT

## 2024-02-24 PROCEDURE — 74177 CT ABD & PELVIS W/CONTRAST: CPT | Mod: MC

## 2024-02-24 PROCEDURE — 85018 HEMOGLOBIN: CPT

## 2024-02-24 PROCEDURE — 82803 BLOOD GASES ANY COMBINATION: CPT

## 2024-02-24 PROCEDURE — 82947 ASSAY GLUCOSE BLOOD QUANT: CPT

## 2024-02-24 PROCEDURE — 85014 HEMATOCRIT: CPT

## 2024-02-24 PROCEDURE — 99285 EMERGENCY DEPT VISIT HI MDM: CPT

## 2024-02-24 PROCEDURE — 80053 COMPREHEN METABOLIC PANEL: CPT

## 2024-02-24 PROCEDURE — 99284 EMERGENCY DEPT VISIT MOD MDM: CPT | Mod: 25

## 2024-02-24 PROCEDURE — 74177 CT ABD & PELVIS W/CONTRAST: CPT | Mod: 26,MC

## 2024-02-24 PROCEDURE — 85730 THROMBOPLASTIN TIME PARTIAL: CPT

## 2024-02-24 PROCEDURE — 36415 COLL VENOUS BLD VENIPUNCTURE: CPT

## 2024-02-24 PROCEDURE — 83690 ASSAY OF LIPASE: CPT

## 2024-02-24 NOTE — ED ADULT NURSE NOTE - NSFALLUNIVINTERV_ED_ALL_ED
Bed/Stretcher in lowest position, wheels locked, appropriate side rails in place/Call bell, personal items and telephone in reach/Instruct patient to call for assistance before getting out of bed/chair/stretcher/Non-slip footwear applied when patient is off stretcher/[place] to call system/Physically safe environment - no spills, clutter or unnecessary equipment/Purposeful proactive rounding/Room/bathroom lighting operational, light cord in reach

## 2024-02-24 NOTE — ED ADULT NURSE NOTE - OBJECTIVE STATEMENT
52 y/o M presents to the ED with complaints of abd pain x 2 months. Pt reports PMH of GI bleeds, last one was last year, diverticulitis, and small bowel resection. Pt reports upper medial abd pain that worsen last night while at work as well as decrease PO intake. Pt denies chest pain, sob, ha, n/v/d, f/c, urinary symptoms, hematuria, dark tarry stool. PT A&Ox3 gross neuro intact, no difficulty speaking in complete sentences, pulses x 4.

## 2024-02-24 NOTE — ED PROVIDER NOTE - CLINICAL SUMMARY MEDICAL DECISION MAKING FREE TEXT BOX
53-year-old male history of hypertension high cholesterol Lap-Band surgery 12 years ago at [facility] prior umbilical hernia repair with mesh and bowel resection for diverticular disease presents the emergency room with complaint of left-sided upper to mid abdominal pain for the past 2 months worse in the last several days. He describes the pain as "a tug or pulling sensation" right where his port is. He endorses feeling off, and generalized weakness the last few days. he denies any chest pain, sob, n/v, dysuria, or hematuria.  given his extensive abdominal hx, will proceed with CT A/P for any intraabdominal pathology

## 2024-02-24 NOTE — ED PROVIDER NOTE - PHYSICAL EXAMINATION
Physical Exam:  Gen:  Well appearing, awake, alert, non-toxic appearing  Head: NCAT  HEENT: Normal conjunctiva, trachea midline, moist mucous membranes  Lung: CTAB, no respiratory distress,speaking in full sentences  CV: RRR, no murmurs, rubs or gallops  Abd: Soft, mildly tender at the site of port.   MSK:  moves all four extremities,  Neuro: No focal motor deficits  Skin: Warm, well perfused, no visible rashes, no leg swelling  Psych: appropriate affect and mood

## 2024-02-24 NOTE — CONSULT NOTE ADULT - ASSESSMENT
ASSESSMENT: Patient is a 53ym pmhx ***    PLAN:   - No acute surgical intervention  - Patient can follow up outpatient w/ bariatric surgery team for discussion of possible lap band removal  - [phone] - [address], [address], [place] [address]  - Dispo per ED      Patient discussed with [facility] attending, Dr. Cristobal.    Mickie Saleem, PGY-2  [facility] ASSESSMENT: 52y/o M w/ PMHx HTN, HLD, partial colectomy for diverticulitis and umbilical hernia w/ mesh ~20yrs ago and lap band surgery 12yrs ago at [facility] with Dr. Tarango presents to ED w/ 2 month hx of abdominal pain at lap band port site. CT and exam w/o significant findings.    PLAN:   - No acute surgical intervention  - Patient can follow up outpatient w/ bariatric surgery team, Dr. Ross, for discussion of possible lap band removal  - [phone] - [address] [place] [address], [address], [place] [address]  - Dispo per ED      Patient discussed with [facility] attending, Dr. Cristobal.    Mickie Saleem, PGY-2  [facility]  [id number]

## 2024-02-24 NOTE — ED PROVIDER NOTE - NSFOLLOWUPINSTRUCTIONS_ED_ALL_ED_FT
You have been evaluated in the Emergency Department today for abdominal pain. Your evaluation did not show evidence of medical conditions requiring emergent intervention at this time.    Please schedule an appointment with your primary care physician.    Return to the Emergency Department if you experience worsening or uncontrolled pain, fevers 100.4°F or greater, recurrent vomiting, inability to tolerate food or fluids by mouth, bloody stools or vomit, black or tarry stools, or any other concerning symptoms.  Thank you for choosing us for your care.

## 2024-02-24 NOTE — CONSULT NOTE ADULT - SUBJECTIVE AND OBJECTIVE BOX
GENERAL SURGERY CONSULT NOTE  --------------------------------------------------------------------------------------------  HPI:      ***      PAST MEDICAL & SURGICAL HISTORY:  Sleep apnea      Obesity, morbid, BMI 40.0-49.9      Anxiety      Hypertension      CAD (coronary artery disease)      Internal hemorrhoids      External hemorrhoids      S/P small bowel resection      History of umbilical hernia repair        FAMILY HISTORY:  FH: diverticulitis (Father)    [] Family history not pertinent as reviewed with the patient and family    SOCIAL HISTORY:  ***    ALLERGIES: No Known Allergies      HOME MEDICATIONS: ***    CURRENT MEDICATIONS  MEDICATIONS (STANDING):   MEDICATIONS (PRN):  --------------------------------------------------------------------------------------------    Vitals:   T(C): 36.6 (02-24-24 @ 15:28), Max: 36.6 (02-24-24 @ 15:28)  HR: 66 (02-24-24 @ 15:28) (66 - 66)  BP: 162/93 (02-24-24 @ 15:28) (162/93 - 162/93)  RR: 16 (02-24-24 @ 15:28) (16 - 16)  SpO2: --  CAPILLARY BLOOD GLUCOSE          Height (cm): 172.7 (02-24 @ 15:28)  Weight (kg): 122.5 (02-24 @ 15:28)  BMI (kg/m2): 41.1 (02-24 @ 15:28)  BSA (m2): 2.32 (02-24 @ 15:28)      PHYSICAL EXAM: ***  General: NAD, Lying in bed comfortably  Neuro: Alert and answering questions appropriately   HEENT: Grossly normal, EOMI  Cardio: No peripherial edema  Resp: Good effort, no signs of respiratory distress  GI/Abd: Soft, NT/ND, no rebound/guarding, no masses palpated  Vascular: All 4 extremities warm  Musculoskeletal: All 4 extremities moving spontaneously, no limitations  --------------------------------------------------------------------------------------------    LABS  CBC (02-24 @ 17:41)                              15.3                           6.76    )----------------(  261        67.0  % Neutrophils, 20.0  % Lymphocytes, ANC: 4.53                                44.0      BMP (02-24 @ 17:41)             135     |  102     |  12    		Ca++ --      Ca 9.5                ---------------------------------( 107<H>		Mg --                 4.3     |  21<L>   |  0.70  			Ph --        LFTs (02-24 @ 17:41)      TPro 7.7 / Alb 4.4 / TBili 0.8 / DBili -- / AST 32 / ALT 40 / AlkPhos 73    Coags (02-24 @ 17:41)  aPTT 27.1 / INR 1.15 / PT 12.0        VBG (02-24 @ 19:35)     7.40 / 42 / 49<H> / 26 / 1.0 / 80.3%     Lactate: 0.9    --------------------------------------------------------------------------------------------    MICROBIOLOGY  Urinalysis (02-24 @ 17:41):     Color:  / Appearance:  / SG:  / pH:  / Gluc: 107<H> / Ketones:  / Bili:  / Urobili:  / Protein : / Nitrites:  / Leuk.Est:  / RBC:  / WBC:  / Sq Epi:  / Non Sq Epi:  / Bacteria          --------------------------------------------------------------------------------------------    IMAGING  ***    --------------------------------------------------------------------------------------------     GENERAL SURGERY CONSULT NOTE  --------------------------------------------------------------------------------------------  HPI:    54y/o M w/ PMHx HTN, HLD, partial colectomy for diverticulitis and umbilical hernia w/ mesh ~20yrs ago and lap band surgery 12yrs ago at [place] with Dr. Tarango presents to ED w/ 2 month hx of abdominal pain at lap band port site. Patient reports that he was lifting weights approximately 2months ago and then started to noticed pain around lap band port site, mainly when he is sitting. States it feels like a pulling/pinching sensation. He endorses feeling more tired and weak over the last few days and was concerned about an infection around the port site. Denies fevers, chills, chest pain, SOB, nausea, vomiting, rashes, skin changes.    In the ED, patient is afebrile, HDS. Labs w/o leukocytosis or significant findings. CT A/P w/o acute intraabdominal pathology, no abnormal findings around lap band port site.      PAST MEDICAL & SURGICAL HISTORY:  Sleep apnea      Obesity, morbid, BMI 40.0-49.9      Anxiety      Hypertension      CAD (coronary artery disease)      Internal hemorrhoids      External hemorrhoids      S/P small bowel resection      History of umbilical hernia repair        FAMILY HISTORY:  FH: diverticulitis (Father)    [] Family history not pertinent as reviewed with the patient and family    SOCIAL HISTORY:     ALLERGIES: No Known Allergies      HOME MEDICATIONS:    CURRENT MEDICATIONS  MEDICATIONS (STANDING):   MEDICATIONS (PRN):  --------------------------------------------------------------------------------------------    Vitals:   T(C): 36.6 (02-24-24 @ 15:28), Max: 36.6 (02-24-24 @ 15:28)  HR: 66 (02-24-24 @ 15:28) (66 - 66)  BP: 162/93 (02-24-24 @ 15:28) (162/93 - 162/93)  RR: 16 (02-24-24 @ 15:28) (16 - 16)  SpO2: --  CAPILLARY BLOOD GLUCOSE          Height (cm): 172.7 (02-24 @ 15:28)  Weight (kg): 122.5 (02-24 @ 15:28)  BMI (kg/m2): 41.1 (02-24 @ 15:28)  BSA (m2): 2.32 (02-24 @ 15:28)      PHYSICAL EXAM:   General: NAD, Lying in bed comfortably  Neuro: Alert and answering questions appropriately   Cardio: Regular rate  Resp: Good effort, no signs of respiratory distress  GI/Abd: Soft, nondistended, L lap band port palpated in subQ w/ minimal tenderness, no rebound or guarding  Musculoskeletal: All 4 extremities moving spontaneously, no limitations  --------------------------------------------------------------------------------------------    LABS  CBC (02-24 @ 17:41)                              15.3                           6.76    )----------------(  261        67.0  % Neutrophils, 20.0  % Lymphocytes, ANC: 4.53                                44.0      BMP (02-24 @ 17:41)             135     |  102     |  12    		Ca++ --      Ca 9.5                ---------------------------------( 107<H>		Mg --                 4.3     |  21<L>   |  0.70  			Ph --        LFTs (02-24 @ 17:41)      TPro 7.7 / Alb 4.4 / TBili 0.8 / DBili -- / AST 32 / ALT 40 / AlkPhos 73    Coags (02-24 @ 17:41)  aPTT 27.1 / INR 1.15 / PT 12.0        VBG (02-24 @ 19:35)     7.40 / 42 / 49<H> / 26 / 1.0 / 80.3%     Lactate: 0.9    --------------------------------------------------------------------------------------------    MICROBIOLOGY  Urinalysis (02-24 @ 17:41):     Color:  / Appearance:  / SG:  / pH:  / Gluc: 107<H> / Ketones:  / Bili:  / Urobili:  / Protein : / Nitrites:  / Leuk.Est:  / RBC:  / WBC:  / Sq Epi:  / Non Sq Epi:  / Bacteria          --------------------------------------------------------------------------------------------    IMAGING  < from: CT Abdomen and Pelvis w/ Oral Cont and w/ IV Cont (02.24.24 @ 18:36) >  FINDINGS:  LOWER CHEST: Within normal limits.    LIVER: Within normal limits.  BILE DUCTS: Normal caliber.  GALLBLADDER: Within normal limits.  SPLEEN: Within normal limits.  PANCREAS: Within normal limits.  ADRENALS: Within normal limits.  KIDNEYS/URETERS: Within normal limits.    BLADDER: Within normal limits.  REPRODUCTIVE ORGANS: Mild prostatic enlargement.    BOWEL: No bowel obstruction. Lap band in appropriate location. Sigmoid   anastomosis. Colonic diverticula. Appendix normal.  PERITONEUM: No ascites.  VESSELS: Mild infrarenal ectasia abdominal aorta 2 2.2 cm.  RETROPERITONEUM/LYMPH NODES: No lymphadenopathy.  ABDOMINAL WALL: Lap band port left upper quadrant abdominal wall.   Fat-containing right inguinal hernia. Fat-containing umbilical hernia   with mesh repair  BONES: Within normal limits.    IMPRESSION:  No acute intra-abdominal process    < end of copied text >      --------------------------------------------------------------------------------------------

## 2024-02-24 NOTE — ED PROVIDER NOTE - ATTENDING CONTRIBUTION TO CARE
53-year-old male history of hypertension high cholesterol Lap-Band surgery 12 years ago at [place] prior umbilical hernia repair with mesh and bowel resection for diverticular disease presents the emergency room with complaint of left-sided upper to mid abdominal pain for the past 2 months worse in the last several days.  He reports decreased appetite no nausea vomiting is having normal bowel movements no fever or chills.  He has a history of prior GI bleeding and is therefore not taking NSAIDs or aspirin.  He reports that the pain is worse at rest feels like a pulling pain emanating from around the region of his Lap-Band port.  Vital signs significant for mild elevation in the blood pressure.  Patient is an obese male in no acute distress.  His heart and lung exam is within normal limits.  He has positive bowel sounds.  Abdomen is obese with mild tenderness to palpation in the left upper quadrant and the left mid abdomen.  There are no palpable hernias or masses.  Differential diagnosis includes but is not limited to spigelian hernia abdominal adhesions Lap-Band problem.  Will obtain CT scan of the abdomen with IV and oral contrast per the bariatric protocol obtain screening labs and obtain a bariatrics consult once imaging is completed.  Disposition is pending results of labs and imaging.

## 2024-02-24 NOTE — CONSULT NOTE ADULT - CONSULT REQUESTED DATE/TIME
24-Feb-2024 21:55 Encounter Date:01/26/2024        Patient ID: Jourdan Lebron is a 49 y.o. male.      Chief Complaint:      History of Present Illness  49 years old male with history of cryptogenic stroke status post mechanical thrombectomy, hypertension, hyperlipidemia, ex-smoking who comes to cardiology office to establish care.  Due to complaints of worsening dysphagia he underwent EGD and colonoscopy which noted a mass in the lower third of esophagus.  Biopsy revealed adenocarcinoma for which he is now undergoing evaluation with thoracic surgery for surgical resection.  He has been referred to cardiology for preoperative cardiovascular risk assessment.    In the past he self.  L LCA stroke on 5/27/2020 for which he received TPA and mechanical thrombectomy, leaving no residual deficits. He had an extensive workup during hospitalization, including CT, MRI, and KARINA which showed normal EF. No evidence of PFO, ASD or VSD, no significant valvular abnormalities, no thrombus in L atrial appendage.  He also had a loop recorder placed with stated him for 3 years.    He has had L adrenalectomy and stable right adrenaloma.     He currently denies any chest pain or shortness of breath.  He reports compliance with all his medications.  He recently stopped aspirin.    The following portions of the patient's history were reviewed and updated as appropriate: allergies, current medications, past family history, past medical history, past social history, past surgical history, and problem list.    Review of Systems   Constitutional: Negative for malaise/fatigue.   Cardiovascular:  Negative for chest pain, dyspnea on exertion, leg swelling and palpitations.   Respiratory:  Negative for cough and shortness of breath.    Gastrointestinal:  Negative for abdominal pain, nausea and vomiting.   Neurological:  Positive for dizziness and light-headedness. Negative for focal weakness, headaches and numbness.   All other systems reviewed and are  negative.        Current Outpatient Medications:     ALPRAZolam (XANAX) 0.25 MG tablet, Take 1 tablet by mouth 3 (Three) Times a Day As Needed for Anxiety. for anxiety, Disp: 90 tablet, Rfl: 3    Evolocumab (REPATHA) solution prefilled syringe injection, Inject 1 mL under the skin into the appropriate area as directed Every 14 (Fourteen) Days., Disp: 6 mL, Rfl: 3    FLUoxetine (PROzac) 20 MG capsule, Take 1 capsule by mouth Daily., Disp: 90 capsule, Rfl: 1    fluticasone (FLONASE) 50 MCG/ACT nasal spray, 1 spray into the nostril(s) as directed by provider Daily., Disp: , Rfl:     lidocaine-prilocaine (EMLA) 2.5-2.5 % cream, Apply 1 application  topically to the appropriate area as directed As Needed (apply 60 minutes prior to port access)., Disp: 30 g, Rfl: 2    ondansetron (ZOFRAN) 8 MG tablet, Take 1 tablet by mouth 3 (Three) Times a Day As Needed for Nausea or Vomiting., Disp: 30 tablet, Rfl: 5    pantoprazole (PROTONIX) 40 MG EC tablet, Take 1 tablet by mouth Daily., Disp: , Rfl:     aspirin 81 MG chewable tablet, Chew 1 tablet Daily. (Patient not taking: Reported on 1/26/2024), Disp: , Rfl:     Nystatin (magic mouthwash), Swish and swallow 5 mL 4 (Four) Times a Day Before Meals & at Bedtime. (Patient not taking: Reported on 1/26/2024), Disp: 1 bottle, Rfl: 4    traMADol (ULTRAM) 50 MG tablet, Take 1 tablet by mouth Every 6 (Six) Hours As Needed for Moderate Pain. (Patient not taking: Reported on 1/26/2024), Disp: 60 tablet, Rfl: 0    Current outpatient and discharge medications have been reconciled for the patient.  Reviewed by: Tristan Hayward MD       Allergies   Allergen Reactions    Cephalosporins Anaphylaxis     Throat swelling    Dye  [Contrast Dye (Echo Or Unknown Ct/Mr)] Hives    Sulfa Antibiotics Hives    Zetia [Ezetimibe] Other (See Comments)     Made tired    Iodinated Contrast Media Unknown - Low Severity    Statins Myalgia     Myalgia and fatique    Sulfate Unknown - Low Severity       Family History    Problem Relation Age of Onset    Arthritis Mother     Hypertension Mother     Heart failure Mother 73        Cause of death    Arthritis Father     Hypertension Father     Kidney cancer Father 57        Cause of death. Was a smoker    Heart disease Brother     Esophageal cancer Brother 59        Cause of death. Has a heavy drinker       Past Surgical History:   Procedure Laterality Date    ADRENALECTOMY      KIDNEY STONE SURGERY      THROMBECTOMY      UPPER ENDOSCOPIC ULTRASOUND W/ FNA N/A 10/27/2023    Procedure: ENDOSCOPIC ULTRASOUND WITH STAGING AND FINE NEEDLE ASPIRATION X2 AREAS;  Surgeon: Joselyn Savage MD;  Location: [facility] ENDOSCOPY;  Service: Gastroenterology;  Laterality: N/A;  POST:    VENOUS ACCESS DEVICE (PORT) INSERTION Right 11/13/2023    Procedure: INSERTION VENOUS ACCESS DEVICE;  Surgeon: Saurabh Hurtado MD;  Location: [facility] MAIN OR;  Service: Thoracic;  Laterality: Right;       Past Medical History:   Diagnosis Date    Abnormal ECG     Acute adrenal crisis 11/06/2023    Adenocarcinoma of esophagus metastatic to intra-abdominal lymph node 11/06/2023    Adrenal cortical hypofunction 03/25/2021    Brain fog     Diverticulosis of large intestine without perforation or abscess without bleeding 10/02/2023    Esophageal cancer     GERD (gastroesophageal reflux disease)     Hand tingling     Hyperlipidemia     Stroke 05/30/2020    Tiredness        Family History   Problem Relation Age of Onset    Arthritis Mother     Hypertension Mother     Heart failure Mother 73        Cause of death    Arthritis Father     Hypertension Father     Kidney cancer Father 57        Cause of death. Was a smoker    Heart disease Brother     Esophageal cancer Brother 59        Cause of death. Has a heavy drinker       Social History     Socioeconomic History    Marital status:    Tobacco Use    Smoking status: Former     Packs/day: 3.00     Years: 37.00     Additional pack years: 0.00     Total pack years: 111.00      "Types: Cigars, Cigarettes     Start date: 1985     Quit date: 2022     Years since quittin.0     Passive exposure: Past    Smokeless tobacco: Never    Tobacco comments:     1 packs= 2 cigars a day; only smokes cigars   Vaping Use    Vaping Use: Never used   Substance and Sexual Activity    Alcohol use: Not Currently     Alcohol/week: 8.0 standard drinks of alcohol     Types: 8 Cans of beer per week     Comment: Has now been abstinent for about one year    Drug use: Never    Sexual activity: Yes     Partners: Female     Birth control/protection: None               Objective:       Physical Exam    BP 90/54 (BP Location: Left arm, Patient Position: Sitting, Cuff Size: Adult)   Pulse 52   Ht 180.3 cm (71\")   Wt 68.9 kg (152 lb)   SpO2 100%   BMI 21.20 kg/m²   The patient is alert, oriented and in no distress.    Vital signs as noted above.    Head and neck revealed no carotid bruits or jugular venous distension.  No thyromegaly or lymphadenopathy is present.    Lungs clear.  No wheezing.  Breath sounds are normal bilaterally.    Heart normal first and second heart sounds.  No murmur..  No pericardial rub is present.  No gallop is present.    Abdomen soft and nontender.  No organomegaly is present.    Extremities revealed good peripheral pulses without any pedal edema.    Skin warm and dry.    Musculoskeletal system is grossly normal.    CNS grossly normal.           Diagnosis Plan   1. Malignant neoplasm of lower third of esophagus        2. Pre-operative cardiovascular examination        3. Cryptogenic stroke        4. Ex-smoker        5. Anxiety and depression        6. Mixed hyperlipidemia        7. Left adrenal mass        LAB RESULTS (LAST 7 DAYS)    CBC  Results from last 7 days   Lab Units 24  0932   WBC 10*3/mm3 3.72   RBC 10*6/mm3 3.74*   HEMOGLOBIN g/dL 12.8*   HEMATOCRIT % 38.4   MCV fL 102.7*   PLATELETS 10*3/mm3 226       BMP  Results from last 7 days   Lab Units 24  0932 "   SODIUM mmol/L 139   POTASSIUM mmol/L 4.3   CHLORIDE mmol/L 102   CO2 mmol/L 31.0*   BUN mg/dL 12   CREATININE mg/dL 0.88   GLUCOSE mg/dL 78       CMP   Results from last 7 days   Lab Units 01/25/24  0932   SODIUM mmol/L 139   POTASSIUM mmol/L 4.3   CHLORIDE mmol/L 102   CO2 mmol/L 31.0*   BUN mg/dL 12   CREATININE mg/dL 0.88   GLUCOSE mg/dL 78   ALBUMIN g/dL 3.8   BILIRUBIN mg/dL <0.2   ALK PHOS U/L 67   AST (SGOT) U/L 15   ALT (SGPT) U/L 15         BNP        TROPONIN        CoAg        Creatinine Clearance  Estimated Creatinine Clearance: 96.8 mL/min (by C-G formula based on SCr of 0.88 mg/dL).    ABG        Radiology  US Carotid Bilateral    Result Date: 1/24/2024  Impression: 1. No significant carotid stenosis. 2. Incidentally noted right thyroid nodule, a follow-up thyroid ultrasound is recommended. Electronically Signed: Sarika Alas MD  1/24/2024 1:57 PM EST  Workstation ID: [id number]     EKG  Procedures    Stress test      Echocardiogram  Results for orders placed during the hospital encounter of 01/22/24    Adult Transthoracic Echo Complete w/ Color, Spectral and Contrast if necessary per protocol    Interpretation Summary    Left ventricular ejection fraction appears to be 51 - 55%.    Left ventricular diastolic function was normal.    Estimated right ventricular systolic pressure from tricuspid regurgitation is mildly elevated (35-45 mmHg).    There is mild aortic insufficiency and mild tricuspid valve regurgitation.      Cardiac catheterization  No results found for this or any previous visit.          Assessment and Plan       Diagnoses and all orders for this visit:    1. Malignant neoplasm of lower third of esophagus (Primary)    2. Pre-operative cardiovascular examination    3. Cryptogenic stroke    4. Ex-smoker    5. Anxiety and depression    6. Mixed hyperlipidemia    7. Left adrenal mass         Adenocarcinoma of esophagus  Status post chemoradiation.  Followed by oncology.  Followed by  thoracic surgery for resection.      Preoperative cardiovascular risk assessment    Facundo Perioperative Risk for Myocardial Infarction or Cardiac Arrest (THOMAS):  0.8% Risk of myocardial infarction or cardiac arrest, intraoperatively or up to 30 days post-op.    Revised Cardiac Risk Index for Pre-Operative Risk:  6% 30-day risk of death, MI, or cardiac arrest.    Given good functional capacity, absence of any symptoms of angina, absence of arrhythmia he may proceed with surgical resection of esophageal cancer with no further cardiac workup.  ECG shows sinus rhythm with right bundle branch block  Echocardiogram shows preserved LV function, normal diastolic function.  Not on a beta-blocker due to bradycardia and hypotension.  Resume aspirin postprocedure    Cryptogenic stroke  Patient is currently on DAPT. He intolerance to high intensity statins and has stopped taking crestor. He is s/p mechanical thrombectomy and tPA with no residual deficits. Currently on ASA to 81 mg QD.    Ex-smoker  He has a 20 pack year history of smoking and quit smoking cigarettes 5 years ago, but he continues to smoke 2 cigars daily.  I have provided smoking cessation counseling for the patient today. I extensively discussed with the patient the cardiovascular risks associated with smoking and other tobacco products.    Anxiety/depression  Currently on Xanax and Prozac.    HLD  He has statin intolerance, even with low intensity statins. He also failed Zetia.   He is now on Repatha.    Left adrenal mass  S/p L adrenalectomy in Feb 2021. Currently on maintenance doses of steroids.     Underweight  BMI is 21.2  He has lost significant weight  Will require a feeding tube after esophageal resection

## 2024-02-24 NOTE — ED PROVIDER NOTE - OBJECTIVE STATEMENT
53-year-old male history of hypertension high cholesterol Lap-Band surgery 12 years ago at [place] prior umbilical hernia repair with mesh and bowel resection for diverticular disease presents the emergency room with complaint of left-sided upper to mid abdominal pain for the past 2 months worse in the last several days. He describes the pain as "a tug or pulling sensation" right where his port is. He endorses feeling off, and generalized weakness the last few days. he denies any chest pain, sob, n/v, dysuria, or hematuria.

## 2024-02-24 NOTE — ED PROVIDER NOTE - PATIENT PORTAL LINK FT
You can access the FollowMyHealth Patient Portal offered by [facility] by registering at the following website: [email or web address]. By joining [employer]’s FollowMyHealth portal, you will also be able to view your health information using other applications (apps) compatible with our system.

## 2024-03-01 RX ORDER — LOSARTAN POTASSIUM 50 MG/1
50 TABLET, FILM COATED ORAL
Qty: 90 | Refills: 1 | Status: ACTIVE | COMMUNITY
Start: 2020-10-16 | End: 1900-01-01

## 2024-03-18 ENCOUNTER — APPOINTMENT (OUTPATIENT)
Dept: INTERNAL MEDICINE | Facility: CLINIC | Age: 54
End: 2024-03-18
Payer: COMMERCIAL

## 2024-03-18 VITALS
SYSTOLIC BLOOD PRESSURE: 124 MMHG | HEART RATE: 72 BPM | BODY MASS INDEX: 41.83 KG/M2 | WEIGHT: 276 LBS | HEIGHT: 68 IN | TEMPERATURE: 98.2 F | OXYGEN SATURATION: 97 % | DIASTOLIC BLOOD PRESSURE: 86 MMHG | RESPIRATION RATE: 17 BRPM

## 2024-03-18 DIAGNOSIS — K92.2 GASTROINTESTINAL HEMORRHAGE, UNSPECIFIED: ICD-10-CM

## 2024-03-18 DIAGNOSIS — R00.2 PALPITATIONS: ICD-10-CM

## 2024-03-18 DIAGNOSIS — Z91.81 HISTORY OF FALLING: ICD-10-CM

## 2024-03-18 DIAGNOSIS — G47.33 OBSTRUCTIVE SLEEP APNEA (ADULT) (PEDIATRIC): ICD-10-CM

## 2024-03-18 DIAGNOSIS — K62.5 HEMORRHAGE OF ANUS AND RECTUM: ICD-10-CM

## 2024-03-18 DIAGNOSIS — R79.89 OTHER SPECIFIED ABNORMAL FINDINGS OF BLOOD CHEMISTRY: ICD-10-CM

## 2024-03-18 DIAGNOSIS — F17.200 NICOTINE DEPENDENCE, UNSPECIFIED, UNCOMPLICATED: ICD-10-CM

## 2024-03-18 DIAGNOSIS — K44.9 DIAPHRAGMATIC HERNIA W/OUT OBSTRUCTION OR GANGRENE: ICD-10-CM

## 2024-03-18 DIAGNOSIS — R11.0 NAUSEA: ICD-10-CM

## 2024-03-18 DIAGNOSIS — E78.00 PURE HYPERCHOLESTEROLEMIA, UNSPECIFIED: ICD-10-CM

## 2024-03-18 DIAGNOSIS — Z01.818 ENCOUNTER FOR OTHER PREPROCEDURAL EXAMINATION: ICD-10-CM

## 2024-03-18 DIAGNOSIS — Z98.84 BARIATRIC SURGERY STATUS: ICD-10-CM

## 2024-03-18 PROCEDURE — 99214 OFFICE O/P EST MOD 30 MIN: CPT

## 2024-03-18 PROCEDURE — G2211 COMPLEX E/M VISIT ADD ON: CPT

## 2024-03-19 NOTE — HISTORY OF PRESENT ILLNESS
[FreeTextEntry1] : History of Present Illness:  This patient is a 55 yo male PMH of smoker, obesity lap band surgery 13 years ago lost 65 lb, diverticular disease/bowel resection, umbilical hernia, herniated disk with R sided sciatica (f/u chiropractor), hx of smoker/vaping, history of GI bleed 2/2 diverticuli requiring 2 units packed red blood cells, he was also taking aspirin and meloxicam at that time.  For obesity, BMI=41, he is preparing for gastric sleeve surgery and is here to repeat labs.  Patient states that he is being seen at the [facility] who have assessed his Lap-Band with recommendations for removal.  He has had imaging studies done including the abdomen which showed no explanation for his continuous discomfort.  Symptoms are attributed to the left and with recommendations for removal.  He is also with a BMI of 41 and is interested in having gastric sleeve surgery to help him with obesity.  He is here in preparation for that surgery and to do labs which have been recommended and ordered today; will fax to his Dr. Maldonado  Smoking the patient needs to do lung cancer screening which was ordered during his annual but not yet done.  Recommendations were again reviewed with the patient and the exam was reordered.  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 1/2-1 ppd x 35 years, now 4-5 cig per day; tried gum, patches- smoking cessation discussed at length, pt is now vaping EtOH/Drug use: Denies drugs, social EtOH  Screening: Colon CA Screening: done Dec 2022 + hx of of GI bleed,  diverticular disease Prostate CA screening: will check Depression screening: PHQ-2 screen is negative Vision/Dental: up-to-date with vision and dental Lung cancer screening: refer given

## 2024-03-19 NOTE — HEALTH RISK ASSESSMENT
[0] : 1) Little interest or pleasure doing things: Not at all (0) [PHQ-2 Negative - No further assessment needed] : PHQ-2 Negative - No further assessment needed [Current] : Current [20 or more] : 20 or more

## 2024-03-19 NOTE — PHYSICAL EXAM
[General Appearance - Alert] : alert [General Appearance - In No Acute Distress] : in no acute distress [Sclera] : the sclera and conjunctiva were normal [PERRL With Normal Accommodation] : pupils were equal in size, round, and reactive to light [Extraocular Movements] : extraocular movements were intact [Outer Ear] : the ears and nose were normal in appearance [Oropharynx] : the oropharynx was normal [Neck Cervical Mass (___cm)] : no neck mass was observed [Neck Appearance] : the appearance of the neck was normal [Thyroid Diffuse Enlargement] : the thyroid was not enlarged [] : no respiratory distress [Auscultation Breath Sounds / Voice Sounds] : lungs were clear to auscultation bilaterally [Heart Rate And Rhythm] : heart rate was normal and rhythm regular [Heart Sounds] : normal S1 and S2 [Murmurs] : no murmurs [Full Pulse] : the pedal pulses are present [Bowel Sounds] : normal bowel sounds [Edema] : there was no peripheral edema [Abdomen Soft] : soft [Abdomen Tenderness] : non-tender [Cervical Lymph Nodes Enlarged Anterior Bilaterally] : anterior cervical [Cervical Lymph Nodes Enlarged Posterior Bilaterally] : posterior cervical [Supraclavicular Lymph Nodes Enlarged Bilaterally] : supraclavicular [No CVA Tenderness] : no ~M costovertebral angle tenderness [Nail Clubbing] : no clubbing  or cyanosis of the fingernails [Abnormal Walk] : normal gait [Musculoskeletal - Swelling] : no joint swelling seen [Motor Tone] : muscle strength and tone were normal [Deep Tendon Reflexes (DTR)] : deep tendon reflexes were 2+ and symmetric [Sensation] : the sensory exam was normal to light touch and pinprick [No Focal Deficits] : no focal deficits [Impaired Insight] : insight and judgment were intact [Oriented To Time, Place, And Person] : oriented to person, place, and time [Affect] : the affect was normal [FreeTextEntry1] : eczema left calf and groin tinea cruris, onychomycosis

## 2024-03-19 NOTE — ASSESSMENT
[FreeTextEntry1] : Assessment/Plan:  This patient is a 55 yo male PMH of smoker, obesity lap band surgery 13 years ago lost 65 lb, diverticular disease/bowel resection, umbilical hernia, herniated disk with R sided sciatica (f/u chiropractor), hx of smoker/vaping, history of GI bleed 2/2 diverticuli   # obesity, BMI=41, he is preparing for gastric sleeve surgery and is here to repeat labs. -under the care of [facility]  -labs ordered -f/u with Dr. Maldonado  # smoker, needs lung cancer screening -Smoking the patient needs to do lung cancer screening which was ordered during his annual but not yet done.  Recommendations were again reviewed with the patient and the exam was reordered.  Smoking cessation advised  # diverticulitis, s/p bowel resection was obese -stable  RTC annual or pre-op

## 2024-03-25 LAB
25(OH)D3 SERPL-MCNC: 59.9 NG/ML
ALBUMIN SERPL ELPH-MCNC: 4.9 G/DL
ALP BLD-CCNC: 69 U/L
ALT SERPL-CCNC: 45 U/L
ANION GAP SERPL CALC-SCNC: 14 MMOL/L
APPEARANCE: CLEAR
APTT BLD: 34.8 SEC
AST SERPL-CCNC: 29 U/L
BILIRUB DIRECT SERPL-MCNC: 0.2 MG/DL
BILIRUB INDIRECT SERPL-MCNC: 0.6 MG/DL
BILIRUB SERPL-MCNC: 0.9 MG/DL
BILIRUBIN URINE: NEGATIVE
BLOOD URINE: NEGATIVE
BUN SERPL-MCNC: 21 MG/DL
CALCIUM SERPL-MCNC: 10.2 MG/DL
CHLORIDE SERPL-SCNC: 101 MMOL/L
CHOLEST SERPL-MCNC: 141 MG/DL
CO2 SERPL-SCNC: 21 MMOL/L
COLOR: NORMAL
CREAT SERPL-MCNC: 0.72 MG/DL
EGFR: 107 ML/MIN/1.73M2
ESTIMATED AVERAGE GLUCOSE: 117 MG/DL
FERRITIN SERPL-MCNC: 77 NG/ML
FOLATE SERPL-MCNC: >20 NG/ML
GLUCOSE QUALITATIVE U: NEGATIVE MG/DL
GLUCOSE SERPL-MCNC: 105 MG/DL
HBA1C MFR BLD HPLC: 5.7 %
HCT VFR BLD CALC: 45.9 %
HDLC SERPL-MCNC: 41 MG/DL
HGB BLD-MCNC: 15.5 G/DL
INR PPP: 1.05 RATIO
IRON SATN MFR SERPL: 28 %
IRON SERPL-MCNC: 121 UG/DL
KETONES URINE: NEGATIVE MG/DL
LDLC SERPL CALC-MCNC: 73 MG/DL
LEUKOCYTE ESTERASE URINE: NEGATIVE
MAGNESIUM SERPL-MCNC: 2.3 MG/DL
MCHC RBC-ENTMCNC: 29.9 PG
MCHC RBC-ENTMCNC: 33.8 GM/DL
MCV RBC AUTO: 88.6 FL
NITRITE URINE: NEGATIVE
NONHDLC SERPL-MCNC: 100 MG/DL
PH URINE: 5.5
PHOSPHATE SERPL-MCNC: 3.2 MG/DL
PLATELET # BLD AUTO: 330 K/UL
POTASSIUM SERPL-SCNC: 4.5 MMOL/L
PROT SERPL-MCNC: 7.8 G/DL
PROTEIN URINE: NEGATIVE MG/DL
PT BLD: 11.9 SEC
RBC # BLD: 5.18 M/UL
RBC # FLD: 12.9 %
SODIUM SERPL-SCNC: 136 MMOL/L
SPECIFIC GRAVITY URINE: 1.02
T3FREE SERPL-MCNC: 3.96 PG/ML
T4 FREE SERPL-MCNC: 1.3 NG/DL
TIBC SERPL-MCNC: 438 UG/DL
TRIGL SERPL-MCNC: 154 MG/DL
TSH SERPL-ACNC: 2.03 UIU/ML
UIBC SERPL-MCNC: 317 UG/DL
UROBILINOGEN URINE: 0.2 MG/DL
VIT B1 SERPL-MCNC: 143.3 NMOL/L
VIT B12 SERPL-MCNC: 697 PG/ML
WBC # FLD AUTO: 8.32 K/UL

## 2024-03-26 ENCOUNTER — APPOINTMENT (OUTPATIENT)
Dept: CARDIOLOGY | Facility: CLINIC | Age: 54
End: 2024-03-26

## 2024-06-06 ENCOUNTER — APPOINTMENT (OUTPATIENT)
Dept: GASTROENTEROLOGY | Facility: CLINIC | Age: 54
End: 2024-06-06
Payer: COMMERCIAL

## 2024-06-06 VITALS
DIASTOLIC BLOOD PRESSURE: 84 MMHG | SYSTOLIC BLOOD PRESSURE: 120 MMHG | HEART RATE: 74 BPM | HEIGHT: 68 IN | BODY MASS INDEX: 41.22 KG/M2 | OXYGEN SATURATION: 97 % | TEMPERATURE: 97.3 F | WEIGHT: 272 LBS

## 2024-06-06 DIAGNOSIS — R13.10 DYSPHAGIA, UNSPECIFIED: ICD-10-CM

## 2024-06-06 DIAGNOSIS — Z01.818 ENCOUNTER FOR OTHER PREPROCEDURAL EXAMINATION: ICD-10-CM

## 2024-06-06 DIAGNOSIS — K22.70 BARRETT'S ESOPHAGUS W/OUT DYSPLASIA: ICD-10-CM

## 2024-06-06 DIAGNOSIS — R10.9 UNSPECIFIED ABDOMINAL PAIN: ICD-10-CM

## 2024-06-06 DIAGNOSIS — K63.3 ULCER OF INTESTINE: ICD-10-CM

## 2024-06-06 PROCEDURE — 99214 OFFICE O/P EST MOD 30 MIN: CPT

## 2024-06-06 RX ORDER — METOCLOPRAMIDE 10 MG/1
10 TABLET ORAL
Qty: 1 | Refills: 0 | Status: DISCONTINUED | COMMUNITY
Start: 2023-01-06 | End: 2024-06-06

## 2024-06-06 RX ORDER — CHOLECALCIFEROL (VITAMIN D3) 25 MCG
TABLET ORAL
Refills: 0 | Status: ACTIVE | COMMUNITY

## 2024-06-06 RX ORDER — MULTIVITAMIN
TABLET ORAL
Refills: 0 | Status: ACTIVE | COMMUNITY

## 2024-06-06 RX ORDER — PSYLLIUM HUSK 0.4 G
CAPSULE ORAL
Refills: 0 | Status: ACTIVE | COMMUNITY

## 2024-06-06 NOTE — CONSULT LETTER
[FreeTextEntry1] : Dear Dr. Ginger Morrison,\par  \par  I had the pleasure of seeing your patient GLEN CAMACHO in the office today.  My office note is attached. PLEASE READ THE "ASSESSMENT" SECTION OF THE NOTE TO SEE MY IMPRESSION AND PLAN.\par  \par  Thank you very much for allowing me to participate in the care of your patient.\par  \par  Sincerely,\par  \par  Tyler Scott M.D., FACG, FACP\par  Director, Celiac Program at [facility]\par   of Medicine, [employer] at [facility]  Adjunct  of Medicine, [employer]\[facility]  Practice Director, [facility] - Gastroenterology at [place]\[facility]  [address] - [address]\[place] [address]\par  Tel: [phone]\par  Email: [email or web address]  \par  \par  The attached note has been created using a voice recognition system (Dragon).  There may be some misspellings and typos.  Please call my office if you have any issues or questions.

## 2024-06-06 NOTE — ASSESSMENT
[FreeTextEntry1] : Patient with a history of intestinal metaplasia at the GE junction, aphthous ulcers in the cecum, and a history of diverticular bleeds.  He has pain at the port from his Lap-Band which was placed 12 years ago.  He has plans to have the Lap-Band removed and to have a sleeve gastrectomy.  A CT scan was negative.  The patient does have dysphagia to solids likely related to the gastric lap band.  In preparation for bariatric surgery, an EGD has been scheduled. The risks, benefits, alternatives, and limitations of the procedure were explained.  The patient will require anesthesia evaluation prior to the procedure given his BMI of 41.36.  Patient is due for colonoscopy in December 2025.   Plan from 1/6/2023 - Patient is status post 3 GI bleeds, thought to be diverticular in origin.  EGD revealed a 1 cm hiatal hernia with intestinal metaplasia at the GE junction.  Colonoscopy revealed tiny aphthous ulcers in the cecum and moderate right-sided diverticulosis.  Patient is on iron and is feeling better.  Blood work was sent for CBC and iron studies.  In order to complete the GI evaluation, A capsule endoscopy has been scheduled. The risks, benefits, alternatives, and limitations of the procedure were explained.  Information was given to the patient regarding diverticulosis and a high-fiber diet.  Patient was advised to stay off of aspirin.  We will repeat EGD and colonoscopy in 3 years that is December 2025.   Plan from 12/8/2022 - Patient status post 3 GI bleeds, 2 in the past year.  Although assumed to be diverticular in origin, he has a history of terminal ileal ulcers.  He continues to feel weak with decreased appetite.  He has noted a change in his bowel habits with his stools becoming thin and small.  He did have 1 small episode of rectal bleeding on November 29.  He has never had an upper endoscopy.  I had originally planned to relook in the colon in May of next year regarding the terminal ileal ulcers.  Given the ongoing symptoms and recent events, I have move this up and will perform EGD and colonoscopy simultaneously next week.  An EGD and colonoscopy have been scheduled. The risks, benefits, alternatives, and limitations of the procedures, including the possibility of missed lesions, were explained.  The patient's BMI is 40.84.  Blood work was sent for CBC, comprehensive metabolic panel, TSH, iron studies, B12, folate, celiac markers.  Patient was advised to restart Metamucil powder in 8 ounces of water daily.  Patient was advised to follow-up with his cardiologist as he is weak and lightheaded particularly with exertion.   Plan from 11/17/2022 - Patient status post his third lower GI bleed that appears to be diverticular in origin.  He does have a history of terminal ileal ulcers but this is likely unrelated.  The patient was advised to use Slow Fe twice a day.  He will go to the ER today regarding his IV site as this may represent a cellulitis or collection.  The patient will return to see me in 3 weeks.  We had planned to perform a repeat colonoscopy in May 2023 to follow-up the terminal ileal ulcers.  Given the severity of the bleed, we may do this sooner and would likely evaluate the upper tract as well.   Plan from 6/6/2022 - Patient status post lower GI bleed that was most likely diverticular in nature.  Colonoscopy did show a terminal ileal ulcer and localized colitis in the mid sigmoid with biopsies both showing inflammation.  It is unclear whether this could represent a more chronic inflammatory disease like Crohn's disease.  The patient is currently asymptomatic.  Blood work was sent for CBC, iron studies, IBD serologies, sed rate, C-reactive protein.  Information was given to the patient regarding diverticulosis and a high-fiber diet.  We will plan on repeating a colonoscopy in 1 year that is May 2023 to assess for chronicity of the terminal ileal ulcer and localized colitis.  Patient will call me if he develops symptoms of diarrhea, abdominal pain, or bleeding.  Patient will return to see me in 1 year or sooner if needed.   Plan from 3/28/2022 - Patient status post lower GI bleed which is most likely diverticular in nature.  He has not had a colonoscopy for 3 years.  He has a mother with colon cancer.  Blood work was sent for CBC and iron studies.  A colonoscopy has been scheduled. The risks, benefits, alternatives, and limitations of the procedure, including the possibility of missed lesions, were explained.  The patient will require anesthesia evaluation prior to the procedure given his BMI of 41.05.  Disability forms were completed for the patient.

## 2024-06-06 NOTE — REASON FOR VISIT
[FreeTextEntry1] : Prebariatric surgery, intestinal metaplasia at the GE junction, at the cecal ulcers, dysphagia, abdominal pain

## 2024-06-06 NOTE — HISTORY OF PRESENT ILLNESS
[FreeTextEntry1] : The patient has a history of intestinal metaplasia at the GE junction and a history of aphthous ulcers in the cecum.  He also has a history of GI bleeds thought to be due to diverticulosis.  He has had no further bleeding since I last saw him in January 2023.  At that time CBC was normal.  Most recently, patient had a CBC on March 18, 2024 with hemoglobin and hematocrit of 15.5 and 45.9 respectively.  The patient has a history of a lap band placed 12 years ago.  He has been experiencing pain at the port site.  He had a CT scan on February 24, 2024 which was negative.  He has seen the bariatric surgeon and there are plans to remove the bands and convert to a gastric sleeve.  The patient denies heartburn.  He does get dysphagia to solids with associated regurgitation which she relates to the laparoscopic band.  He notes that this happens if he eats larger size food.  He has no problems with liquids.  He denies nausea or vomiting.  He denies any other abdominal pain other than the pain at the Lap-Band port site.  He reports 1 solid bowel movement a day without melena or bright red blood per rectum.  The patient's weight is stable.  The patient has not been admitted to the hospital in the past year and denies any cardiac issues.   Note from 1/6/2023 - We reviewed the evaluations done since the patient's last visit on December 8, 2022.  At that time, blood work revealed hemoglobin and hematocrit of 11.4 and 33.7 which were both improved.  Ferritin was 24 with a 7% saturation.  The patient underwent EGD and colonoscopy on December 12, 2022.  EGD was significant for a 1 cm hiatal hernia with biopsies revealing intestinal metaplasia at the GE junction.  Colonoscopy was significant for tiny aphthous ulcers in the cecum with biopsy showing acute and chronic nonspecific inflammation.  3 hyperplastic polyps were removed from the distal sigmoid.  Patient has moderate diverticulosis involving the cecum, ascending colon, and transverse colon.  He also has internal and external hemorrhoids which are asymptomatic.  He has been taking iron daily and notes that his energy level is better and almost back to normal.  He was on daily aspirin for years but stopped it.  He denies heartburn, dysphagia, abdominal pain.  Bowel movements are normal without melena or bright red blood per rectum.  He is status post his third GI bleed, all thought to be due to diverticulosis.   Note from 12/8/2022 - The patient is status post 2 GI bleeds this year, the last in November.  These were thought to be likely diverticular in origin.  The patient also has a history of terminal ileal ulcers.  At his last visit on November 17, 2022, the patient had an issue at his IV site which was diagnosed as superficial phlebitis.  The patient continues to not feel well.  He notes that his bowel movements have changed and are now thin and small although he is having 1 daily.  He had bright red blood mixed in his stool twice in 1 day on November 29.  He denies melena but his stool is still dark.  He is on Slow Fe twice daily.  He continues to feel weak with no energy.  He gets lightheaded with exertion.  He denies chest pain.  He notes a decreased appetite.  He denies heartburn, dysphagia, abdominal pain.  His weight is stable.  He has had no other hospitalizations in the past year other than for the 2 bleeds.  He denies any cardiac history.   Note from 11/17/2022 - We reviewed the events and evaluations since the patient's last visit on June 6, 2022.  At that time, blood work showed IBD serologies which could be consistent with Crohn's disease.  This was done because of the history of terminal ileal ulcer.  Additionally, C-reactive protein was mildly elevated at 5 and sed rate was 32.  Iron saturation was 14%.  A CT enterography performed on July 5, 2022 showed fatty liver but no evidence of small bowel disease.  The patient was well until last Wednesday, when he had multiple bloody bowel movements beginning at 2 AM.  He was taken by ambulance to [facility].  He had syncope while in the hospital with no associated trauma.  He was admitted from November 10 in November 14.  During that admission, he received 2 units of packed red blood cells for hemoglobin as low as 8.3.  He had 2 CT scans of the abdomen and pelvis with no significant results.  On discharge, hemoglobin and hematocrit were 9.1 and 27.2 respectively.  This is the third GI bleed that the patient has had, first in 2019, then March 2022, and now.  He currently is having 1 small solid bowel movement a day which is still dark.  He denies abdominal pain, nausea, vomiting, heartburn, dysphagia.  Of note, the patient has erythema and warmth added IV site with a palpable cord.  He was given clindamycin for this yesterday.  Also of note, the patient was taking meloxicam and aspirin.   Note from 6/6/2022 - We reviewed the evaluations done since the patient's last visit on March 28, 2022.  He had a borderline anemia at that time with a hemoglobin and hematocrit of 12.3 and 36.0 respectively.  He was placed on Feosol 325 mg a day which he took until 2 weeks ago.  The patient underwent colonoscopy on May 9, 2022.  The exam was significant for the presence of a terminal ileal ulcer with biopsy showing acute and chronic inflammation.  There was also localized inflammation with aphthous ulcer in the mid sigmoid with biopsy showing acute and chronic inflammation, acute cryptitis, and crypt architectural distortion.  The patient has moderate diverticulosis involving the cecum, ascending, and transverse colon.  He has internal and external hemorrhoids which are asymptomatic.  The patient has had no further bleeding.  He reports 1 bowel movement a day which is solid without melena or bright red blood per rectum.  He denies abdominal pain.  He uses Metamucil as needed.  Of note, the patient does have a nephew with Crohn's disease.   Note from 3/28/2022 - The patient is a 52-year-old man seen by me for the first time since 2016.  We spent some time reviewing the patient's history.  The patient has a history of a diverticular bleed in 2019.  The patient was well until 8 days ago when he had 4 large bloody bowel movements.  He went to the [facility] ER where he had 4-5 more bloody bowel movements.  He was in the hospital for 2 nights and observed.  He was sent home 1 week ago.  After discharge, the patient passed 1 black bowel movement with blood and then had some blood streaking on brown stool for a few days.  He is now back to having a brown bowel movement once a day.  He was fatigued last week but now is feeling better.  He denies heartburn, dysphagia, or abdominal pain.  Hemoglobin and hematocrit were 12.0 and 35.1 respectively on March 21.  Of note, the patient's mother had colon cancer.  His last colonoscopy was performed in the hospital in 2019.  I was unable to find the results.  The patient has had no other hospitalizations in the past year.  He has a history of mild coronary artery disease.

## 2024-06-10 ENCOUNTER — APPOINTMENT (OUTPATIENT)
Dept: GASTROENTEROLOGY | Facility: AMBULATORY MEDICAL SERVICES | Age: 54
End: 2024-06-10
Payer: COMMERCIAL

## 2024-06-10 DIAGNOSIS — K29.80 DUODENITIS W/OUT BLEEDING: ICD-10-CM

## 2024-06-10 DIAGNOSIS — K21.00 GASTRO-ESOPHAGEAL REFLUX DISEASE WITH ESOPHAGITIS, WITHOUT BLEEDING: ICD-10-CM

## 2024-06-10 DIAGNOSIS — K29.60 OTHER GASTRITIS W/OUT BLEEDING: ICD-10-CM

## 2024-06-10 PROCEDURE — 43239 EGD BIOPSY SINGLE/MULTIPLE: CPT

## 2024-06-10 RX ORDER — PANTOPRAZOLE 40 MG/1
40 TABLET, DELAYED RELEASE ORAL
Qty: 90 | Refills: 1 | Status: ACTIVE | COMMUNITY
Start: 2024-06-10 | End: 1900-01-01

## 2024-06-20 ENCOUNTER — NON-APPOINTMENT (OUTPATIENT)
Age: 54
End: 2024-06-20

## 2024-08-19 ENCOUNTER — APPOINTMENT (OUTPATIENT)
Dept: CARDIOLOGY | Facility: CLINIC | Age: 54
End: 2024-08-19
Payer: COMMERCIAL

## 2024-08-19 ENCOUNTER — NON-APPOINTMENT (OUTPATIENT)
Age: 54
End: 2024-08-19

## 2024-08-19 VITALS
WEIGHT: 263 LBS | HEIGHT: 68 IN | OXYGEN SATURATION: 97 % | DIASTOLIC BLOOD PRESSURE: 76 MMHG | BODY MASS INDEX: 39.86 KG/M2 | HEART RATE: 64 BPM | SYSTOLIC BLOOD PRESSURE: 100 MMHG

## 2024-08-19 VITALS — DIASTOLIC BLOOD PRESSURE: 80 MMHG | SYSTOLIC BLOOD PRESSURE: 122 MMHG

## 2024-08-19 DIAGNOSIS — I25.10 ATHEROSCLEROTIC HEART DISEASE OF NATIVE CORONARY ARTERY W/OUT ANGINA PECTORIS: ICD-10-CM

## 2024-08-19 DIAGNOSIS — E66.9 OBESITY, UNSPECIFIED: ICD-10-CM

## 2024-08-19 DIAGNOSIS — R09.89 OTHER SPECIFIED SYMPTOMS AND SIGNS INVOLVING THE CIRCULATORY AND RESPIRATORY SYSTEMS: ICD-10-CM

## 2024-08-19 PROCEDURE — 99215 OFFICE O/P EST HI 40 MIN: CPT

## 2024-08-19 PROCEDURE — 93000 ELECTROCARDIOGRAM COMPLETE: CPT

## 2024-08-19 RX ORDER — METOPROLOL SUCCINATE 25 MG/1
25 TABLET, EXTENDED RELEASE ORAL DAILY
Qty: 90 | Refills: 3 | Status: ACTIVE | COMMUNITY
Start: 2024-08-19 | End: 1900-01-01

## 2024-08-19 NOTE — DISCUSSION/SUMMARY
[FreeTextEntry1] : Advised contin efforts for diet, exercise and wt loss Recommended splitting dose BP meds to take metoprolol at 7 PM.  Reduced dose of metoprolol to 25 mg QD due to ED.  Follow up 3 mo, consider changing BB to amlodipine. Contin statin and losartan. Off aspirin due to hist of GI bleed.  [EKG obtained to assist in diagnosis and management of assessed problem(s)] : EKG obtained to assist in diagnosis and management of assessed problem(s)

## 2024-08-19 NOTE — HISTORY OF PRESENT ILLNESS
[FreeTextEntry1] : Here for routine follow up and needs med refill. compl of ED. Reports his BP is generally normal at home, but when at work overnight under stress he feels his BP go up and this is manifested as mild tightness in chest. He occas checks BP at work with higher reading. No chest discomfort or SOB with exercise - he walks regularly and lifts weights. He works overnights and takes BP meds in morning. He has lost weight with diet and exercise and plans to join [employer]. Labs from March reviewed.

## 2024-09-10 ENCOUNTER — APPOINTMENT (OUTPATIENT)
Dept: GASTROENTEROLOGY | Facility: CLINIC | Age: 54
End: 2024-09-10
Payer: COMMERCIAL

## 2024-09-10 VITALS
WEIGHT: 256 LBS | OXYGEN SATURATION: 98 % | TEMPERATURE: 97.1 F | BODY MASS INDEX: 38.8 KG/M2 | HEART RATE: 71 BPM | DIASTOLIC BLOOD PRESSURE: 80 MMHG | HEIGHT: 68 IN | SYSTOLIC BLOOD PRESSURE: 124 MMHG

## 2024-09-10 DIAGNOSIS — K29.80 DUODENITIS W/OUT BLEEDING: ICD-10-CM

## 2024-09-10 DIAGNOSIS — K21.00 GASTRO-ESOPHAGEAL REFLUX DISEASE WITH ESOPHAGITIS, WITHOUT BLEEDING: ICD-10-CM

## 2024-09-10 DIAGNOSIS — K22.70 BARRETT'S ESOPHAGUS W/OUT DYSPLASIA: ICD-10-CM

## 2024-09-10 DIAGNOSIS — K29.60 OTHER GASTRITIS W/OUT BLEEDING: ICD-10-CM

## 2024-09-10 PROCEDURE — 99214 OFFICE O/P EST MOD 30 MIN: CPT

## 2024-09-10 NOTE — HISTORY OF PRESENT ILLNESS
[FreeTextEntry1] : We reviewed the patient's EGD performed on Lashonda 10, 2024.  EGD was significant for grade 2 reflux esophagitis and a mildly irregular Z-line.  The patient has a Lap-Band and has moderate erosive gastritis in the antrum and body and severe erosive duodenitis in the duodenal bulb.  Biopsy showed duodenitis as well as focal intestinal metaplasia at the GE junction.  He was taking aspirin and Motrin but has stopped these and is now only using Tylenol.  He is now on pantoprazole 40 mg a day.  He gets occasional mild epigastric burning, approximately once a week.  He denies heartburn, dysphagia, nausea, vomiting.  Bowel movements are normal without melena or bright red blood per rectum.  He is on weight watchers and is lost 20 pounds.  He was being considered for gastric bypass as apparently the presence of intestinal metaplasia precludes conversion to a sleeve gastrectomy.  It appears he does not wish to pursue this.   Note from 6/6/2024 - The patient has a history of intestinal metaplasia at the GE junction and a history of aphthous ulcers in the cecum.  He also has a history of GI bleeds thought to be due to diverticulosis.  He has had no further bleeding since I last saw him in January 2023.  At that time CBC was normal.  Most recently, patient had a CBC on March 18, 2024 with hemoglobin and hematocrit of 15.5 and 45.9 respectively.  The patient has a history of a lap band placed 12 years ago.  He has been experiencing pain at the port site.  He had a CT scan on February 24, 2024 which was negative.  He has seen the bariatric surgeon and there are plans to remove the bands and convert to a gastric sleeve.  The patient denies heartburn.  He does get dysphagia to solids with associated regurgitation which she relates to the laparoscopic band.  He notes that this happens if he eats larger size food.  He has no problems with liquids.  He denies nausea or vomiting.  He denies any other abdominal pain other than the pain at the Lap-Band port site.  He reports 1 solid bowel movement a day without melena or bright red blood per rectum.  The patient's weight is stable.  The patient has not been admitted to the hospital in the past year and denies any cardiac issues.   Note from 1/6/2023 - We reviewed the evaluations done since the patient's last visit on December 8, 2022.  At that time, blood work revealed hemoglobin and hematocrit of 11.4 and 33.7 which were both improved.  Ferritin was 24 with a 7% saturation.  The patient underwent EGD and colonoscopy on December 12, 2022.  EGD was significant for a 1 cm hiatal hernia with biopsies revealing intestinal metaplasia at the GE junction.  Colonoscopy was significant for tiny aphthous ulcers in the cecum with biopsy showing acute and chronic nonspecific inflammation.  3 hyperplastic polyps were removed from the distal sigmoid.  Patient has moderate diverticulosis involving the cecum, ascending colon, and transverse colon.  He also has internal and external hemorrhoids which are asymptomatic.  He has been taking iron daily and notes that his energy level is better and almost back to normal.  He was on daily aspirin for years but stopped it.  He denies heartburn, dysphagia, abdominal pain.  Bowel movements are normal without melena or bright red blood per rectum.  He is status post his third GI bleed, all thought to be due to diverticulosis.   Note from 12/8/2022 - The patient is status post 2 GI bleeds this year, the last in November.  These were thought to be likely diverticular in origin.  The patient also has a history of terminal ileal ulcers.  At his last visit on November 17, 2022, the patient had an issue at his IV site which was diagnosed as superficial phlebitis.  The patient continues to not feel well.  He notes that his bowel movements have changed and are now thin and small although he is having 1 daily.  He had bright red blood mixed in his stool twice in 1 day on November 29.  He denies melena but his stool is still dark.  He is on Slow Fe twice daily.  He continues to feel weak with no energy.  He gets lightheaded with exertion.  He denies chest pain.  He notes a decreased appetite.  He denies heartburn, dysphagia, abdominal pain.  His weight is stable.  He has had no other hospitalizations in the past year other than for the 2 bleeds.  He denies any cardiac history.   Note from 11/17/2022 - We reviewed the events and evaluations since the patient's last visit on June 6, 2022.  At that time, blood work showed IBD serologies which could be consistent with Crohn's disease.  This was done because of the history of terminal ileal ulcer.  Additionally, C-reactive protein was mildly elevated at 5 and sed rate was 32.  Iron saturation was 14%.  A CT enterography performed on July 5, 2022 showed fatty liver but no evidence of small bowel disease.  The patient was well until last Wednesday, when he had multiple bloody bowel movements beginning at 2 AM.  He was taken by ambulance to [facility].  He had syncope while in the hospital with no associated trauma.  He was admitted from November 10 in November 14.  During that admission, he received 2 units of packed red blood cells for hemoglobin as low as 8.3.  He had 2 CT scans of the abdomen and pelvis with no significant results.  On discharge, hemoglobin and hematocrit were 9.1 and 27.2 respectively.  This is the third GI bleed that the patient has had, first in 2019, then March 2022, and now.  He currently is having 1 small solid bowel movement a day which is still dark.  He denies abdominal pain, nausea, vomiting, heartburn, dysphagia.  Of note, the patient has erythema and warmth added IV site with a palpable cord.  He was given clindamycin for this yesterday.  Also of note, the patient was taking meloxicam and aspirin.   Note from 6/6/2022 - We reviewed the evaluations done since the patient's last visit on March 28, 2022.  He had a borderline anemia at that time with a hemoglobin and hematocrit of 12.3 and 36.0 respectively.  He was placed on Feosol 325 mg a day which he took until 2 weeks ago.  The patient underwent colonoscopy on May 9, 2022.  The exam was significant for the presence of a terminal ileal ulcer with biopsy showing acute and chronic inflammation.  There was also localized inflammation with aphthous ulcer in the mid sigmoid with biopsy showing acute and chronic inflammation, acute cryptitis, and crypt architectural distortion.  The patient has moderate diverticulosis involving the cecum, ascending, and transverse colon.  He has internal and external hemorrhoids which are asymptomatic.  The patient has had no further bleeding.  He reports 1 bowel movement a day which is solid without melena or bright red blood per rectum.  He denies abdominal pain.  He uses Metamucil as needed.  Of note, the patient does have a nephew with Crohn's disease.   Note from 3/28/2022 - The patient is a 52-year-old man seen by me for the first time since 2016.  We spent some time reviewing the patient's history.  The patient has a history of a diverticular bleed in 2019.  The patient was well until 8 days ago when he had 4 large bloody bowel movements.  He went to the [facility] where he had 4-5 more bloody bowel movements.  He was in the hospital for 2 nights and observed.  He was sent home 1 week ago.  After discharge, the patient passed 1 black bowel movement with blood and then had some blood streaking on brown stool for a few days.  He is now back to having a brown bowel movement once a day.  He was fatigued last week but now is feeling better.  He denies heartburn, dysphagia, or abdominal pain.  Hemoglobin and hematocrit were 12.0 and 35.1 respectively on March 21.  Of note, the patient's mother had colon cancer.  His last colonoscopy was performed in the hospital in 2019.  I was unable to find the results.  The patient has had no other hospitalizations in the past year.  He has a history of mild coronary artery disease.

## 2024-09-10 NOTE — REASON FOR VISIT
[FreeTextEntry1] : Reflux esophagitis, intestinal metaplasia at the GE junction, erosive gastritis, erosive duodenitis

## 2024-09-10 NOTE — CONSULT LETTER
[FreeTextEntry1] : Dear Dr. Ginger Morrison,\par  \par  I had the pleasure of seeing your patient GLEN CAMACHO in the office today.  My office note is attached. PLEASE READ THE "ASSESSMENT" SECTION OF THE NOTE TO SEE MY IMPRESSION AND PLAN.\par  \par  Thank you very much for allowing me to participate in the care of your patient.\par  \par  Sincerely,\par  \par  Tyler Scott M.D., FACG, FACP\par  Director, Celiac Program at [facility]\par   of Medicine, [employer] at [facility] [place]/[facility]  Adjunct  of Medicine, [facility]  Practice Director, [facility] - Gastroenterology at [place]\[facility]  [address]\[place] [address]\par  Tel: [phone]\par  Email: [email or web address]\par  \par  \par  The attached note has been created using a voice recognition system (Dragon).  There may be some misspellings and typos.  Please call my office if you have any issues or questions.

## 2024-09-10 NOTE — ASSESSMENT
[FreeTextEntry1] : Patient with grade 2 reflux esophagitis and a consistent finding of focal intestinal metaplasia at the GE junction.  He had moderate erosive gastritis and severe erosive duodenitis.  He is on pantoprazole 40 mg a day.  He is doing generally well.  We will continue pantoprazole 40 mg a day.  We will repeat EGD in 3 years that is June 2027.  Patient is due for colonoscopy in December 2025.  A list of dietary and lifestyle modifications in the treatment of GERD was given to the patient.  We discussed this in depth.  Patient will return to see me in 6 months.   Plan from 6/6/2024 - Patient with a history of intestinal metaplasia at the GE junction, aphthous ulcers in the cecum, and a history of diverticular bleeds.  He has pain at the port from his Lap-Band which was placed 12 years ago.  He has plans to have the Lap-Band removed and to have a sleeve gastrectomy.  A CT scan was negative.  The patient does have dysphagia to solids likely related to the gastric lap band.  In preparation for bariatric surgery, an EGD has been scheduled. The risks, benefits, alternatives, and limitations of the procedure were explained.  The patient will require anesthesia evaluation prior to the procedure given his BMI of 41.36.  Patient is due for colonoscopy in December 2025.   Plan from 1/6/2023 - Patient is status post 3 GI bleeds, thought to be diverticular in origin.  EGD revealed a 1 cm hiatal hernia with intestinal metaplasia at the GE junction.  Colonoscopy revealed tiny aphthous ulcers in the cecum and moderate right-sided diverticulosis.  Patient is on iron and is feeling better.  Blood work was sent for CBC and iron studies.  In order to complete the GI evaluation, A capsule endoscopy has been scheduled. The risks, benefits, alternatives, and limitations of the procedure were explained.  Information was given to the patient regarding diverticulosis and a high-fiber diet.  Patient was advised to stay off of aspirin.  We will repeat EGD and colonoscopy in 3 years that is December 2025.   Plan from 12/8/2022 - Patient status post 3 GI bleeds, 2 in the past year.  Although assumed to be diverticular in origin, he has a history of terminal ileal ulcers.  He continues to feel weak with decreased appetite.  He has noted a change in his bowel habits with his stools becoming thin and small.  He did have 1 small episode of rectal bleeding on November 29.  He has never had an upper endoscopy.  I had originally planned to relook in the colon in May of next year regarding the terminal ileal ulcers.  Given the ongoing symptoms and recent events, I have move this up and will perform EGD and colonoscopy simultaneously next week.  An EGD and colonoscopy have been scheduled. The risks, benefits, alternatives, and limitations of the procedures, including the possibility of missed lesions, were explained.  The patient's BMI is 40.84.  Blood work was sent for CBC, comprehensive metabolic panel, TSH, iron studies, B12, folate, celiac markers.  Patient was advised to restart Metamucil powder in 8 ounces of water daily.  Patient was advised to follow-up with his cardiologist as he is weak and lightheaded particularly with exertion.   Plan from 11/17/2022 - Patient status post his third lower GI bleed that appears to be diverticular in origin.  He does have a history of terminal ileal ulcers but this is likely unrelated.  The patient was advised to use Slow Fe twice a day.  He will go to the ER today regarding his IV site as this may represent a cellulitis or collection.  The patient will return to see me in 3 weeks.  We had planned to perform a repeat colonoscopy in May 2023 to follow-up the terminal ileal ulcers.  Given the severity of the bleed, we may do this sooner and would likely evaluate the upper tract as well.   Plan from 6/6/2022 - Patient status post lower GI bleed that was most likely diverticular in nature.  Colonoscopy did show a terminal ileal ulcer and localized colitis in the mid sigmoid with biopsies both showing inflammation.  It is unclear whether this could represent a more chronic inflammatory disease like Crohn's disease.  The patient is currently asymptomatic.  Blood work was sent for CBC, iron studies, IBD serologies, sed rate, C-reactive protein.  Information was given to the patient regarding diverticulosis and a high-fiber diet.  We will plan on repeating a colonoscopy in 1 year that is May 2023 to assess for chronicity of the terminal ileal ulcer and localized colitis.  Patient will call me if he develops symptoms of diarrhea, abdominal pain, or bleeding.  Patient will return to see me in 1 year or sooner if needed.   Plan from 3/28/2022 - Patient status post lower GI bleed which is most likely diverticular in nature.  He has not had a colonoscopy for 3 years.  He has a mother with colon cancer.  Blood work was sent for CBC and iron studies.  A colonoscopy has been scheduled. The risks, benefits, alternatives, and limitations of the procedure, including the possibility of missed lesions, were explained.  The patient will require anesthesia evaluation prior to the procedure given his BMI of 41.05.  Disability forms were completed for the patient.

## 2024-09-10 NOTE — PHYSICAL EXAM
[General Appearance - Alert] : alert [General Appearance - In No Acute Distress] : in no acute distress [Neck Appearance] : the appearance of the neck was normal [Neck Cervical Mass (___cm)] : no neck mass was observed [Jugular Venous Distention Increased] : there was no jugular-venous distention [Thyroid Diffuse Enlargement] : the thyroid was not enlarged [Thyroid Nodule] : there were no palpable thyroid nodules [Heart Rate And Rhythm] : heart rate was normal and rhythm regular [Auscultation Breath Sounds / Voice Sounds] : lungs were clear to auscultation bilaterally [Heart Sounds] : normal S1 and S2 [Heart Sounds Gallop] : no gallops [Murmurs] : no murmurs [Heart Sounds Pericardial Friction Rub] : no pericardial rub [Edema] : there was no peripheral edema [Bowel Sounds] : normal bowel sounds [Abdomen Soft] : soft [Abdomen Tenderness] : non-tender [] : no hepato-splenomegaly [Abdomen Mass (___ Cm)] : no abdominal mass palpated [No CVA Tenderness] : no ~M costovertebral angle tenderness [No Spinal Tenderness] : no spinal tenderness [Oriented To Time, Place, And Person] : oriented to person, place, and time [Impaired Insight] : insight and judgment were intact [Affect] : the affect was normal

## 2024-10-24 ENCOUNTER — NON-APPOINTMENT (OUTPATIENT)
Age: 54
End: 2024-10-24

## 2024-11-12 ENCOUNTER — APPOINTMENT (OUTPATIENT)
Dept: INTERNAL MEDICINE | Facility: CLINIC | Age: 54
End: 2024-11-12

## 2024-11-26 ENCOUNTER — TRANSCRIPTION ENCOUNTER (OUTPATIENT)
Age: 54
End: 2024-11-26

## 2024-12-06 ENCOUNTER — RX RENEWAL (OUTPATIENT)
Age: 54
End: 2024-12-06

## 2024-12-09 ENCOUNTER — NON-APPOINTMENT (OUTPATIENT)
Age: 54
End: 2024-12-09

## 2024-12-09 ENCOUNTER — APPOINTMENT (OUTPATIENT)
Dept: CARDIOLOGY | Facility: CLINIC | Age: 54
End: 2024-12-09
Payer: COMMERCIAL

## 2024-12-09 VITALS
OXYGEN SATURATION: 98 % | HEART RATE: 69 BPM | WEIGHT: 252 LBS | DIASTOLIC BLOOD PRESSURE: 78 MMHG | BODY MASS INDEX: 38.19 KG/M2 | HEIGHT: 68 IN | SYSTOLIC BLOOD PRESSURE: 128 MMHG

## 2024-12-09 DIAGNOSIS — R09.89 OTHER SPECIFIED SYMPTOMS AND SIGNS INVOLVING THE CIRCULATORY AND RESPIRATORY SYSTEMS: ICD-10-CM

## 2024-12-09 DIAGNOSIS — Z91.89 OTHER SPECIFIED PERSONAL RISK FACTORS, NOT ELSEWHERE CLASSIFIED: ICD-10-CM

## 2024-12-09 DIAGNOSIS — I25.10 ATHEROSCLEROTIC HEART DISEASE OF NATIVE CORONARY ARTERY W/OUT ANGINA PECTORIS: ICD-10-CM

## 2024-12-09 DIAGNOSIS — R07.89 OTHER CHEST PAIN: ICD-10-CM

## 2024-12-09 PROCEDURE — 99214 OFFICE O/P EST MOD 30 MIN: CPT

## 2024-12-09 PROCEDURE — 93000 ELECTROCARDIOGRAM COMPLETE: CPT

## 2024-12-30 ENCOUNTER — TRANSCRIPTION ENCOUNTER (OUTPATIENT)
Age: 54
End: 2024-12-30

## 2025-01-21 ENCOUNTER — APPOINTMENT (OUTPATIENT)
Dept: CARDIOLOGY | Facility: CLINIC | Age: 55
End: 2025-01-21
Payer: COMMERCIAL

## 2025-01-21 PROCEDURE — 93351 STRESS TTE COMPLETE: CPT

## 2025-02-03 ENCOUNTER — APPOINTMENT (OUTPATIENT)
Dept: INTERNAL MEDICINE | Facility: CLINIC | Age: 55
End: 2025-02-03
Payer: COMMERCIAL

## 2025-02-03 VITALS
SYSTOLIC BLOOD PRESSURE: 124 MMHG | BODY MASS INDEX: 38.65 KG/M2 | HEART RATE: 70 BPM | HEIGHT: 68 IN | DIASTOLIC BLOOD PRESSURE: 80 MMHG | WEIGHT: 255 LBS | RESPIRATION RATE: 17 BRPM | OXYGEN SATURATION: 96 % | TEMPERATURE: 97.7 F

## 2025-02-03 DIAGNOSIS — G47.33 OBSTRUCTIVE SLEEP APNEA (ADULT) (PEDIATRIC): ICD-10-CM

## 2025-02-03 DIAGNOSIS — E66.9 OBESITY, UNSPECIFIED: ICD-10-CM

## 2025-02-03 DIAGNOSIS — R00.2 PALPITATIONS: ICD-10-CM

## 2025-02-03 DIAGNOSIS — Z00.00 ENCOUNTER FOR GENERAL ADULT MEDICAL EXAMINATION W/OUT ABNORMAL FINDINGS: ICD-10-CM

## 2025-02-03 DIAGNOSIS — Z98.84 BARIATRIC SURGERY STATUS: ICD-10-CM

## 2025-02-03 DIAGNOSIS — K92.2 GASTROINTESTINAL HEMORRHAGE, UNSPECIFIED: ICD-10-CM

## 2025-02-03 PROCEDURE — 99396 PREV VISIT EST AGE 40-64: CPT

## 2025-02-03 PROCEDURE — 99214 OFFICE O/P EST MOD 30 MIN: CPT | Mod: 25

## 2025-02-03 RX ORDER — TIRZEPATIDE 2.5 MG/.5ML
2.5 INJECTION, SOLUTION SUBCUTANEOUS
Qty: 4 | Refills: 1 | Status: ACTIVE | COMMUNITY
Start: 2025-02-03 | End: 1900-01-01

## 2025-02-04 LAB
25(OH)D3 SERPL-MCNC: 31.2 NG/ML
ALBUMIN SERPL ELPH-MCNC: 4.6 G/DL
ALP BLD-CCNC: 76 U/L
ALT SERPL-CCNC: 25 U/L
ANION GAP SERPL CALC-SCNC: 11 MMOL/L
APPEARANCE: CLEAR
AST SERPL-CCNC: 19 U/L
BILIRUB SERPL-MCNC: 0.8 MG/DL
BILIRUBIN URINE: NEGATIVE
BLOOD URINE: NEGATIVE
BUN SERPL-MCNC: 15 MG/DL
CALCIUM SERPL-MCNC: 9.8 MG/DL
CHLORIDE SERPL-SCNC: 101 MMOL/L
CHOLEST SERPL-MCNC: 133 MG/DL
CO2 SERPL-SCNC: 24 MMOL/L
COLOR: NORMAL
CREAT SERPL-MCNC: 0.86 MG/DL
EGFR: 102 ML/MIN/1.73M2
ESTIMATED AVERAGE GLUCOSE: 105 MG/DL
GLUCOSE QUALITATIVE U: NEGATIVE MG/DL
GLUCOSE SERPL-MCNC: 110 MG/DL
HBA1C MFR BLD HPLC: 5.3 %
HCT VFR BLD CALC: 44.6 %
HDLC SERPL-MCNC: 48 MG/DL
HGB BLD-MCNC: 14.6 G/DL
KETONES URINE: NEGATIVE MG/DL
LDLC SERPL CALC-MCNC: 64 MG/DL
LEUKOCYTE ESTERASE URINE: NEGATIVE
MCHC RBC-ENTMCNC: 30 PG
MCHC RBC-ENTMCNC: 32.7 G/DL
MCV RBC AUTO: 91.8 FL
NITRITE URINE: NEGATIVE
NONHDLC SERPL-MCNC: 85 MG/DL
PH URINE: 5.5
PLATELET # BLD AUTO: 277 K/UL
POTASSIUM SERPL-SCNC: 4.4 MMOL/L
PROT SERPL-MCNC: 7.4 G/DL
PROTEIN URINE: NEGATIVE MG/DL
PSA FREE FLD-MCNC: 26 %
PSA FREE SERPL-MCNC: 0.24 NG/ML
PSA SERPL-MCNC: 0.95 NG/ML
RBC # BLD: 4.86 M/UL
RBC # FLD: 13.1 %
SODIUM SERPL-SCNC: 136 MMOL/L
SPECIFIC GRAVITY URINE: 1.02
TRIGL SERPL-MCNC: 113 MG/DL
TSH SERPL-ACNC: 1.18 UIU/ML
UROBILINOGEN URINE: 0.2 MG/DL
WBC # FLD AUTO: 9.03 K/UL

## 2025-02-08 ENCOUNTER — RX RENEWAL (OUTPATIENT)
Age: 55
End: 2025-02-08

## 2025-02-11 ENCOUNTER — RX RENEWAL (OUTPATIENT)
Age: 55
End: 2025-02-11

## 2025-02-12 ENCOUNTER — APPOINTMENT (OUTPATIENT)
Dept: BARIATRICS | Facility: CLINIC | Age: 55
End: 2025-02-12

## 2025-02-18 ENCOUNTER — NON-APPOINTMENT (OUTPATIENT)
Age: 55
End: 2025-02-18

## 2025-02-18 ENCOUNTER — OUTPATIENT (OUTPATIENT)
Dept: OUTPATIENT SERVICES | Facility: HOSPITAL | Age: 55
LOS: 1 days | End: 2025-02-18

## 2025-02-18 ENCOUNTER — TRANSCRIPTION ENCOUNTER (OUTPATIENT)
Age: 55
End: 2025-02-18

## 2025-02-18 ENCOUNTER — APPOINTMENT (OUTPATIENT)
Dept: INTERNAL MEDICINE | Facility: CLINIC | Age: 55
End: 2025-02-18

## 2025-02-18 DIAGNOSIS — Z98.89 OTHER SPECIFIED POSTPROCEDURAL STATES: Chronic | ICD-10-CM

## 2025-02-19 ENCOUNTER — TRANSCRIPTION ENCOUNTER (OUTPATIENT)
Age: 55
End: 2025-02-19

## 2025-03-25 ENCOUNTER — APPOINTMENT (OUTPATIENT)
Dept: INTERNAL MEDICINE | Facility: CLINIC | Age: 55
End: 2025-03-25
Payer: COMMERCIAL

## 2025-03-25 VITALS — HEIGHT: 68 IN | WEIGHT: 243 LBS | BODY MASS INDEX: 36.83 KG/M2

## 2025-03-25 DIAGNOSIS — E66.9 OBESITY, UNSPECIFIED: ICD-10-CM

## 2025-03-25 DIAGNOSIS — G47.33 OBSTRUCTIVE SLEEP APNEA (ADULT) (PEDIATRIC): ICD-10-CM

## 2025-03-25 DIAGNOSIS — K29.60 OTHER GASTRITIS W/OUT BLEEDING: ICD-10-CM

## 2025-03-25 DIAGNOSIS — Z98.84 BARIATRIC SURGERY STATUS: ICD-10-CM

## 2025-03-25 PROCEDURE — 99214 OFFICE O/P EST MOD 30 MIN: CPT | Mod: 95

## 2025-03-25 PROCEDURE — G2211 COMPLEX E/M VISIT ADD ON: CPT | Mod: 95

## 2025-03-26 RX ORDER — TIRZEPATIDE 5 MG/.5ML
5 INJECTION, SOLUTION SUBCUTANEOUS
Qty: 3 | Refills: 0 | Status: ACTIVE | COMMUNITY
Start: 2025-03-25 | End: 1900-01-01

## 2025-04-21 ENCOUNTER — APPOINTMENT (OUTPATIENT)
Dept: CARDIOLOGY | Facility: CLINIC | Age: 55
End: 2025-04-21
Payer: COMMERCIAL

## 2025-04-21 VITALS — DIASTOLIC BLOOD PRESSURE: 70 MMHG | OXYGEN SATURATION: 98 % | SYSTOLIC BLOOD PRESSURE: 122 MMHG | HEART RATE: 70 BPM

## 2025-04-21 DIAGNOSIS — R09.89 OTHER SPECIFIED SYMPTOMS AND SIGNS INVOLVING THE CIRCULATORY AND RESPIRATORY SYSTEMS: ICD-10-CM

## 2025-04-21 DIAGNOSIS — N52.8 OTHER MALE ERECTILE DYSFUNCTION: ICD-10-CM

## 2025-04-21 DIAGNOSIS — I25.10 ATHEROSCLEROTIC HEART DISEASE OF NATIVE CORONARY ARTERY W/OUT ANGINA PECTORIS: ICD-10-CM

## 2025-04-21 PROCEDURE — 99214 OFFICE O/P EST MOD 30 MIN: CPT

## 2025-06-10 ENCOUNTER — APPOINTMENT (OUTPATIENT)
Dept: INTERNAL MEDICINE | Facility: CLINIC | Age: 55
End: 2025-06-10
Payer: COMMERCIAL

## 2025-06-10 VITALS
BODY MASS INDEX: 33.34 KG/M2 | TEMPERATURE: 97.8 F | WEIGHT: 220 LBS | HEART RATE: 94 BPM | RESPIRATION RATE: 17 BRPM | SYSTOLIC BLOOD PRESSURE: 133 MMHG | DIASTOLIC BLOOD PRESSURE: 88 MMHG | OXYGEN SATURATION: 97 % | HEIGHT: 68 IN

## 2025-06-10 PROCEDURE — 99214 OFFICE O/P EST MOD 30 MIN: CPT

## 2025-06-10 PROCEDURE — G2211 COMPLEX E/M VISIT ADD ON: CPT

## 2025-06-10 RX ORDER — TIRZEPATIDE 7.5 MG/.5ML
7.5 INJECTION, SOLUTION SUBCUTANEOUS
Qty: 3 | Refills: 1 | Status: ACTIVE | COMMUNITY
Start: 2025-06-10 | End: 1900-01-01

## 2025-06-11 LAB
ALBUMIN SERPL ELPH-MCNC: 4.7 G/DL
ALP BLD-CCNC: 63 U/L
ALT SERPL-CCNC: 32 U/L
AMYLASE/CREAT SERPL: 52 U/L
ANION GAP SERPL CALC-SCNC: 16 MMOL/L
APPEARANCE: CLEAR
AST SERPL-CCNC: 26 U/L
BILIRUB SERPL-MCNC: 0.9 MG/DL
BILIRUBIN URINE: ABNORMAL
BLOOD URINE: NEGATIVE
BUN SERPL-MCNC: 15 MG/DL
CALCIUM SERPL-MCNC: 9.9 MG/DL
CHLORIDE SERPL-SCNC: 101 MMOL/L
CO2 SERPL-SCNC: 21 MMOL/L
COLOR: NORMAL
CREAT SERPL-MCNC: 0.86 MG/DL
EGFRCR SERPLBLD CKD-EPI 2021: 101 ML/MIN/1.73M2
GLUCOSE QUALITATIVE U: NEGATIVE MG/DL
GLUCOSE SERPL-MCNC: 81 MG/DL
KETONES URINE: ABNORMAL MG/DL
LEUKOCYTE ESTERASE URINE: NEGATIVE
LPL SERPL-CCNC: 48 U/L
NITRITE URINE: NEGATIVE
PH URINE: 5.5
POTASSIUM SERPL-SCNC: 4.4 MMOL/L
PROT SERPL-MCNC: 7.2 G/DL
PROTEIN URINE: NORMAL MG/DL
PSA FREE FLD-MCNC: 24 %
PSA FREE SERPL-MCNC: 0.28 NG/ML
PSA SERPL-MCNC: 1.16 NG/ML
SODIUM SERPL-SCNC: 138 MMOL/L
SPECIFIC GRAVITY URINE: 1.02
UROBILINOGEN URINE: 0.2 MG/DL

## 2025-06-17 ENCOUNTER — TRANSCRIPTION ENCOUNTER (OUTPATIENT)
Age: 55
End: 2025-06-17

## 2025-06-26 ENCOUNTER — RX RENEWAL (OUTPATIENT)
Age: 55
End: 2025-06-26

## 2025-07-22 ENCOUNTER — APPOINTMENT (OUTPATIENT)
Dept: GASTROENTEROLOGY | Facility: CLINIC | Age: 55
End: 2025-07-22

## 2025-07-28 ENCOUNTER — APPOINTMENT (OUTPATIENT)
Dept: CARDIOLOGY | Facility: CLINIC | Age: 55
End: 2025-07-28

## 2025-09-09 ENCOUNTER — RX RENEWAL (OUTPATIENT)
Age: 55
End: 2025-09-09